# Patient Record
Sex: MALE | Race: WHITE | NOT HISPANIC OR LATINO | Employment: OTHER | URBAN - NONMETROPOLITAN AREA
[De-identification: names, ages, dates, MRNs, and addresses within clinical notes are randomized per-mention and may not be internally consistent; named-entity substitution may affect disease eponyms.]

---

## 2017-02-20 ENCOUNTER — OFFICE VISIT (OUTPATIENT)
Dept: URGENT CARE | Facility: CLINIC | Age: 82
End: 2017-02-20
Payer: MEDICARE

## 2017-02-20 ENCOUNTER — HOSPITAL ENCOUNTER (OUTPATIENT)
Dept: RADIOLOGY | Facility: CLINIC | Age: 82
Discharge: HOME | End: 2017-02-20
Admitting: PHYSICIAN ASSISTANT
Payer: MEDICARE

## 2017-02-20 DIAGNOSIS — R06.2 WHEEZING: ICD-10-CM

## 2017-02-20 PROCEDURE — G0463 HOSPITAL OUTPT CLINIC VISIT: HCPCS

## 2017-02-20 PROCEDURE — 99214 OFFICE O/P EST MOD 30 MIN: CPT

## 2017-02-20 PROCEDURE — 71020 HB CHEST X-RAY 2VW FRONTAL&LATL: CPT

## 2017-11-20 ENCOUNTER — OFFICE VISIT (OUTPATIENT)
Dept: URGENT CARE | Facility: CLINIC | Age: 82
End: 2017-11-20
Payer: MEDICARE

## 2017-11-20 PROCEDURE — G0463 HOSPITAL OUTPT CLINIC VISIT: HCPCS

## 2017-11-20 PROCEDURE — 99213 OFFICE O/P EST LOW 20 MIN: CPT

## 2017-11-22 NOTE — PROGRESS NOTES
Assessment    1  History of acute bronchitis (V12 69) (Z87 09)   2  Acute bronchitis (466 0) (J20 9)    Plan  Acute bronchitis    · Amoxicillin-Pot Clavulanate 875-125 MG Oral Tablet (Augmentin); TAKE 1 TABLETEVERY 12 HOURS DAILY   · MethylPREDNISolone 4 MG Oral Tablet Therapy Pack (Medrol); 24mg day 1 thendecrease by 4 mg po daily for 6 days    Discussion/Summary  Discussion Summary:   Discussed dx of bronchitisd and will treat with augmentin and medrol dose pack is already using albuterol nebulizer according to patient and follow up with PCP in 1-2 days  Medication Side Effects Reviewed: Possible side effects of new medications were reviewed with the patient/guardian today  Understands and agrees with treatment plan: The treatment plan was reviewed with the patient/guardian  The patient/guardian understands and agrees with the treatment plan   Counseling Documentation With Imm: The patient was counseled regarding instructions for management,-- patient and family education,-- importance of compliance with treatment  total time of encounter was 25 minutes-- and-- 10 minutes was spent counseling  Follow Up Instructions: Follow Up with your Primary Care Provider in 1-2 days  If your symptoms worsen, go to the nearest [place] [id number] Emergency Department  Chief Complaint    1  Cold Symptoms  Chief Complaint Free Text Note Form: Since Friday productive cough congestion worse at night  History of Present Illness  HPI: 80year old male at urgent care today with chief complaint of chest congestion and productive cough denies nay fevers or chills  He has not used nay OTC medications   Hospital Based Practices Required Assessment:  Pain Assessment  the patient states they do not have pain  (on a scale of 0 to 10, the patient rates the pain at 0 )  Abuse And Domestic Violence Screen   Yes, the patient is safe at home  -- The patient states no one is hurting them  Depression And Suicide Screen   No, the patient has not had thoughts of hurting themself  No, the patient has not felt depressed in the past 7 days  Prefered Language is  [place]  Primary Language is  English  Readiness To Learn: Receptive  Barriers To Learning: none  Preferred Learning: verbal  Education Completed: disease/condition,-- medications-- and-- further treatment/follow-up  Teaching Method: verbal  Person Taught: patient  Evaluation Of Learning: verbalized/demonstrated understanding   Cold Symptoms: Acosta Flowers presents with complaints of cold symptoms  Associated symptoms include productive cough, but-- no sneezing,-- no nasal congestion,-- no runny nose,-- no post nasal drainage,-- no scratchy throat,-- no sore throat,-- no hoarseness,-- no dry cough,-- no facial pressure,-- no facial pain,-- no headache,-- no plugged ear(s),-- no ear pain,-- no swollen lymph nodes,-- no wheezing,-- no shortness of breath,-- no fatigue,-- no weakness,-- no nausea,-- no vomiting,-- no diarrhea,-- no fever-- and-- no chills  Review of Systems  Focused-Male:  Constitutional: no fever or chills, feels well, no tiredness, no recent weight loss or weight gain  ENT: no complaints of earache, no loss of hearing, no nosebleeds or nasal discharge, no sore throat or hoarseness  Cardiovascular: no complaints of slow or fast heart rate, no chest pain, no palpitations, no leg claudication or lower extremity edema  Respiratory: cough-- and-- wheezing, but-- as noted in HPI  Gastrointestinal: no complaints of abdominal pain, no constipation, no nausea or vomiting, no diarrhea or bloody stools  Genitourinary: no complaints of dysuria or incontinence, no hesitancy, no nocturia, no genital lesion, no inadequacy of penile erection  Musculoskeletal: no complaints of arthralgia, no myalgia, no joint swelling or stiffness, no limb pain or swelling  Integumentary: no complaints of skin rash or lesion, no itching or dry skin, no skin wounds    Neurological: no complaints of headache, no confusion, no numbness or tingling, no dizziness or fainting  ROS Reviewed:   ROS reviewed  Past Medical History    1  History of acute bronchitis (V12 69) (Z87 09)   2  History of hypertension (V12 59) (Z86 79)  Active Problems And Past Medical History Reviewed: The active problems and past medical history were reviewed and updated today  Family History  Family History Reviewed: The family history was reviewed and updated today  Social History  Social History Reviewed: The social history was reviewed and updated today  The social history was reviewed and is unchanged  Surgical History  Surgical History Reviewed: The surgical history was reviewed and updated today  Current Meds   1  AmLODIPine Besylate 5 MG Oral Tablet; Therapy: (Recorded:[id number]) to Recorded   2  Aspirin 81 MG TABS; Therapy: (Recorded:[id number]) to Recorded   3  Digoxin 125 MCG Oral Tablet; Therapy: (Recorded:[id number]) to Recorded   4  Losartan Potassium 50 MG Oral Tablet; Therapy: (Recorded:[id number]) to Recorded   5  Toprol XL 25 MG Oral Tablet Extended Release 24 Hour; Therapy: (Recorded:[id number]) to Recorded   6  Ventolin  (90 Base) MCG/ACT Inhalation Aerosol Solution; INHALE 1 PUFF EVERY 4 HOURS AS NEEDED; Therapy: [id number] to (Last Rx:[id number])  Requested for: [id number] Ordered  Medication List Reviewed: The medication list was reviewed and updated today  Allergies    1  No Known Drug Allergies    Vitals  Signs   Recorded: 20Nov2017 01:55PM   Temperature: 97 8 F  Heart Rate: 70  Respiration: 20  Systolic: 036  Diastolic: 76  O2 Saturation: 95    Physical Exam   Constitutional  General appearance: No acute distress, well appearing and well nourished  Eyes  Conjunctiva and lids: No swelling, erythema, or discharge  Pupils and irises: Equal, round and reactive to light     Ears, Nose, Mouth, and Throat  External inspection of ears and nose: Normal    Otoscopic examination: Tympanic membrance translucent with normal light reflex  Canals patent without erythema  Nasal mucosa, septum, and turbinates: Normal without edema or erythema  Oropharynx: Normal with no erythema, edema, exudate or lesions  Pulmonary  Respiratory effort: No increased work of breathing or signs of respiratory distress  Auscultation of lungs: Abnormal   Auscultation of the lungs revealed no expiratory wheezing  no rales or crackles were heard bilaterally  no rhonchi  no friction rub  diffuse wheezing bilaterally  Cardiovascular  Palpation of heart: Normal PMI, no thrills  Auscultation of heart: Normal rate and rhythm, normal S1 and S2, without murmurs  Lymphatic  Palpation of lymph nodes in neck: No lymphadenopathy     Musculoskeletal  Gait and station: Normal    Psychiatric  Orientation to person, place and time: Normal    Mood and affect: Normal        Signatures   Electronically signed by : Zaida Cameron NP; Nov 20 2017  2:10PM EST                       (Author)    Electronically signed by : MIRTHA Ward ; Nov 21 2017  4:22PM EST                       (Co-author)

## 2018-12-03 ENCOUNTER — OFFICE VISIT (OUTPATIENT)
Dept: URGENT CARE | Facility: CLINIC | Age: 83
End: 2018-12-03
Payer: MEDICARE

## 2018-12-03 VITALS
RESPIRATION RATE: 20 BRPM | OXYGEN SATURATION: 95 % | TEMPERATURE: 97.6 F | HEART RATE: 75 BPM | WEIGHT: 143 LBS | DIASTOLIC BLOOD PRESSURE: 79 MMHG | BODY MASS INDEX: 21.18 KG/M2 | SYSTOLIC BLOOD PRESSURE: 181 MMHG | HEIGHT: 69 IN

## 2018-12-03 DIAGNOSIS — J22 LOWER RESPIRATORY INFECTION (E.G., BRONCHITIS, PNEUMONIA, PNEUMONITIS, PULMONITIS): Primary | ICD-10-CM

## 2018-12-03 PROCEDURE — 99203 OFFICE O/P NEW LOW 30 MIN: CPT | Performed by: PHYSICIAN ASSISTANT

## 2018-12-03 PROCEDURE — G0463 HOSPITAL OUTPT CLINIC VISIT: HCPCS | Performed by: PHYSICIAN ASSISTANT

## 2018-12-03 RX ORDER — DIGOXIN 125 MCG
TABLET ORAL DAILY
COMMUNITY
End: 2019-07-31 | Stop reason: SDUPTHER

## 2018-12-03 RX ORDER — ALBUTEROL SULFATE 90 UG/1
2 AEROSOL, METERED RESPIRATORY (INHALATION) EVERY 6 HOURS PRN
Qty: 1 INHALER | Refills: 0 | Status: SHIPPED | OUTPATIENT
Start: 2018-12-03

## 2018-12-03 RX ORDER — LOSARTAN POTASSIUM 100 MG/1
TABLET ORAL DAILY
COMMUNITY
End: 2019-07-31 | Stop reason: ALTCHOICE

## 2018-12-03 RX ORDER — ASPIRIN 81 MG/1
81 TABLET ORAL DAILY
COMMUNITY

## 2018-12-03 RX ORDER — LEVOFLOXACIN 500 MG/1
500 TABLET, FILM COATED ORAL EVERY 24 HOURS
Qty: 7 TABLET | Refills: 0 | Status: SHIPPED | OUTPATIENT
Start: 2018-12-03 | End: 2018-12-10

## 2018-12-03 RX ORDER — MULTIVITAMIN
1 CAPSULE ORAL DAILY
COMMUNITY

## 2018-12-03 RX ORDER — BENZONATATE 100 MG/1
100 CAPSULE ORAL 3 TIMES DAILY PRN
Qty: 15 CAPSULE | Refills: 0 | Status: SHIPPED | OUTPATIENT
Start: 2018-12-03 | End: 2019-07-29 | Stop reason: ALTCHOICE

## 2018-12-03 RX ORDER — METOPROLOL TARTRATE 100 MG/1
TABLET ORAL EVERY 12 HOURS SCHEDULED
COMMUNITY
End: 2019-07-31 | Stop reason: SDUPTHER

## 2018-12-04 NOTE — PATIENT INSTRUCTIONS
Take antibiotic as directed until completion  Motrin and/or Tylenol as needed for fevers or body aches and pains  Use medications as directed for symptomatic relief  Follow up with PCP in 3-5 days  Proceed to  ER if symptoms worsen  Acute Bronchitis   AMBULATORY CARE:   Acute bronchitis  is swelling and irritation in the air passages of your lungs  This irritation may cause you to cough or have other breathing problems  Acute bronchitis often starts because of another illness, such as a cold or the flu  The illness spreads from your nose and throat to your windpipe and airways  Bronchitis is often called a chest cold  Acute bronchitis lasts about 3 to 6 weeks and is usually not a serious illness  Your cough can last for several weeks  You may have any of the following symptoms:   · A cough with sputum that may be clear, yellow, or green    · Feeling more tired than usual, and body aches    · A fever and chills    · Wheezing when you breathe    · A tight chest or pain when you breathe or cough  Seek care immediately if:   · You cough up blood  · Your lips or fingernails turn blue  · You feel like you are not getting enough air when you breathe  Contact your healthcare provider if:   · You have a fever  · Your breathing problems do not go away or get worse  · Your cough does not get better within 4 weeks  · You have questions or concerns about your condition or care  Self-care:   · Get more rest   Rest helps your body to heal  Slowly start to do more each day  Rest when you feel it is needed  · Avoid irritants in the air  Avoid chemicals, fumes, and dust  Wear a face mask if you must work around dust or fumes  Stay inside on days when air pollution levels are high  If you have allergies, stay inside when pollen counts are high  Do not use aerosol products, such as spray-on deodorant, bug spray, and hair spray  · Do not smoke or be around others who smoke    Nicotine and other chemicals in cigarettes and cigars damages the cilia that move mucus out of your lungs  Ask your healthcare provider for information if you currently smoke and need help to quit  E-cigarettes or smokeless tobacco still contain nicotine  Talk to your healthcare provider before you use these products  · Drink liquids as directed  Liquids help keep your air passages moist and help you cough up mucus  You may need to drink more liquids when you have acute bronchitis  Ask how much liquid to drink each day and which liquids are best for you  · Use a humidifier or vaporizer  Use a cool mist humidifier or a vaporizer to increase air moisture in your home  This may make it easier for you to breathe and help decrease your cough  Prevent acute bronchitis by doing the following:   · Get the vaccinations you need  Ask your healthcare provider if you should get vaccinated against the flu or pneumonia  · Prevent the spread of germs  You can decrease your risk of acute bronchitis and other illnesses by doing the following:     [facility] your hands often with soap and water  Carry germ-killing hand lotion or gel with you  You can use the lotion or gel to clean your hands when soap and water are not available  ¨ Do not touch your eyes, nose, or mouth unless you have washed your hands first     ¨ Always cover your mouth when you cough to prevent the spread of germs  It is best to cough into a tissue or your shirt sleeve instead of into your hand  Ask those around you cover their mouths when they cough  ¨ Try to avoid people who have a cold or the flu  If you are sick, stay away from others as much as possible  Medicines: Your healthcare provider may  give you any of the following:  · Ibuprofen or acetaminophen  are medicines that help lower your fever  They are available without a doctor's order  Ask your healthcare provider which medicine is right for you  Ask how much to take and how often to take it  Follow directions   These medicines can cause stomach bleeding if not taken correctly  Ibuprofen can cause kidney damage  Do not take ibuprofen if you have kidney disease, an ulcer, or allergies to aspirin  Acetaminophen can cause liver damage  Do not take more than 4,000 milligrams in 24 hours  · Decongestants  help loosen mucus in your lungs and make it easier to cough up  This can help you breathe easier  · Cough suppressants  decrease your urge to cough  If your cough produces mucus, do not take a cough suppressant unless your healthcare provider tells you to  Your healthcare provider may suggest that you take a cough suppressant at night so you can rest     · Inhalers  may be given  Your healthcare provider may give you one or more inhalers to help you breathe easier and cough less  An inhaler gives your medicine to open your airways  Ask your healthcare provider to show you how to use your inhaler correctly  Follow up with your healthcare provider as directed:  Write down questions you have so you will remember to ask them during your follow-up visits  © 2017 [facility] Information is for End User's use only and may not be sold, redistributed or otherwise used for commercial purposes  All illustrations and images included in CareNotes® are the copyrighted property of A D A M , Inc  or Ilia Ruffin  The above information is an  only  It is not intended as medical advice for individual conditions or treatments  Talk to your doctor, nurse or pharmacist before following any medical regimen to see if it is safe and effective for you

## 2018-12-04 NOTE — PROGRESS NOTES
[facility]        NAME: Edwina Nunes is a 80 y o  male  : 1930    MRN: [id number]  DATE: December 3, 2018  TIME: 7:16 PM    Assessment and Plan   Lower respiratory infection (e g , bronchitis, pneumonia, pneumonitis, pulmonitis) [J22]  1  Lower respiratory infection (e g , bronchitis, pneumonia, pneumonitis, pulmonitis)  levofloxacin (LEVAQUIN) 500 mg tablet    albuterol (PROVENTIL HFA,VENTOLIN HFA) 90 mcg/act inhaler    benzonatate (TESSALON PERLES) 100 mg capsule         Patient Instructions     Take antibiotic as directed until completion  Motrin and/or Tylenol as needed for fevers or body aches and pains  Use medications as directed for symptomatic relief  Follow up with PCP in 3-5 days  Proceed to  ER if symptoms worsen  Chief Complaint     Chief Complaint   Patient presents with    Cough     Productive cough, onset 7 days ago  Symptoms worsening         History of Present Illness       80year-old  male presents with a one-week history of cough and congestion  Patient reports over the past week has been having more cough congestion with bringing up sputum  Denies any fevers chills nausea vomiting diarrhea  No chest pain or abdominal pain  Cough   This is a new problem  The current episode started in the past 7 days  The problem has been gradually worsening  The problem occurs constantly  The cough is productive of sputum  Associated symptoms include nasal congestion, rhinorrhea and wheezing  Pertinent negatives include no chest pain, ear pain, fever or sore throat  Nothing aggravates the symptoms  He has tried nothing for the symptoms  The treatment provided no relief  Review of Systems   Review of Systems   Constitutional: Negative  Negative for fever  HENT: Positive for rhinorrhea  Negative for ear pain and sore throat  Eyes: Negative  Respiratory: Positive for cough and wheezing  Cardiovascular: Negative  Negative for chest pain     Gastrointestinal: Negative  Musculoskeletal: Negative  Skin: Negative  Neurological: Negative  Current Medications       Current Outpatient Prescriptions:     aspirin (ECOTRIN LOW STRENGTH) 81 mg EC tablet, Take 81 mg by mouth daily, Disp: , Rfl:     digoxin (LANOXIN) 0 125 mg tablet, Take by mouth daily, Disp: , Rfl:     losartan (COZAAR) 100 MG tablet, Take by mouth daily, Disp: , Rfl:     metoprolol tartrate (LOPRESSOR) 100 mg tablet, Take by mouth every 12 (twelve) hours, Disp: , Rfl:     Multiple Vitamin (MULTIVITAMIN) capsule, Take 1 capsule by mouth daily, Disp: , Rfl:     albuterol (PROVENTIL HFA,VENTOLIN HFA) 90 mcg/act inhaler, Inhale 2 puffs every 6 (six) hours as needed for wheezing, Disp: 1 Inhaler, Rfl: 0    benzonatate (TESSALON PERLES) 100 mg capsule, Take 1 capsule (100 mg total) by mouth 3 (three) times a day as needed for cough, Disp: 15 capsule, Rfl: 0    levofloxacin (LEVAQUIN) 500 mg tablet, Take 1 tablet (500 mg total) by mouth every 24 hours for 7 days, Disp: 7 tablet, Rfl: 0    Current Allergies     Allergies as of 12/03/2018    (No Known Allergies)            The following portions of the patient's history were reviewed and updated as appropriate: allergies, current medications, past family history, past medical history, past social history, past surgical history and problem list      Past Medical History:   Diagnosis Date    Atrial fibrillation [facility] [address] )     Colorectal cancer ([facility] [address] )     Hypertension     Pneumonia        Past Surgical History:   Procedure Laterality Date    COLECTOMY      COLOSTOMY         History reviewed  No pertinent family history  Medications have been verified  Objective   BP (!) 181/79   Pulse 75   Temp 97 6 °F (36 4 °C) (Tympanic)   Resp 20   Ht 5' 9" (1 753 m)   Wt 64 9 kg (143 lb)   SpO2 95%   BMI 21 12 kg/m²        Physical Exam     Physical Exam   Constitutional: He is oriented to person, place, and time   He appears well-developed and well-nourished  No distress  HENT:   Head: Normocephalic and atraumatic  Right Ear: External ear normal    Left Ear: External ear normal    Nose: Nose normal    Mouth/Throat: Oropharynx is clear and moist  No oropharyngeal exudate  Eyes: Conjunctivae are normal  Right eye exhibits no discharge  Left eye exhibits no discharge  Neck: Normal range of motion  Neck supple  Cardiovascular: Normal rate, regular rhythm, normal heart sounds and intact distal pulses  No murmur heard  Pulmonary/Chest: Effort normal  No respiratory distress  He has decreased breath sounds  He has wheezes  He has no rhonchi  He has no rales  Abdominal: Soft  Bowel sounds are normal  There is no tenderness  Musculoskeletal: Normal range of motion  Lymphadenopathy:     He has no cervical adenopathy  Neurological: He is alert and oriented to person, place, and time  Skin: Skin is warm and dry  Psychiatric: He has a normal mood and affect  Nursing note and vitals reviewed

## 2019-07-29 RX ORDER — AZELASTINE 1 MG/ML
1 SPRAY, METERED NASAL 2 TIMES DAILY
COMMUNITY
End: 2019-07-31 | Stop reason: SDUPTHER

## 2019-07-31 ENCOUNTER — OFFICE VISIT (OUTPATIENT)
Dept: FAMILY MEDICINE CLINIC | Facility: CLINIC | Age: 84
End: 2019-07-31
Payer: MEDICARE

## 2019-07-31 VITALS
BODY MASS INDEX: 20.14 KG/M2 | WEIGHT: 136 LBS | HEIGHT: 69 IN | SYSTOLIC BLOOD PRESSURE: 120 MMHG | DIASTOLIC BLOOD PRESSURE: 70 MMHG

## 2019-07-31 DIAGNOSIS — N39.46 MIXED STRESS AND URGE URINARY INCONTINENCE: Chronic | ICD-10-CM

## 2019-07-31 DIAGNOSIS — I48.0 PAROXYSMAL ATRIAL FIBRILLATION (HCC): ICD-10-CM

## 2019-07-31 DIAGNOSIS — I10 ESSENTIAL HYPERTENSION: Primary | ICD-10-CM

## 2019-07-31 DIAGNOSIS — J30.89 NON-SEASONAL ALLERGIC RHINITIS DUE TO OTHER ALLERGIC TRIGGER: ICD-10-CM

## 2019-07-31 DIAGNOSIS — Z93.3 COLOSTOMY PRESENT (HCC): Chronic | ICD-10-CM

## 2019-07-31 PROBLEM — C20 RECTAL CARCINOMA (HCC): Chronic | Status: ACTIVE | Noted: 2019-07-31

## 2019-07-31 PROCEDURE — 99214 OFFICE O/P EST MOD 30 MIN: CPT | Performed by: FAMILY MEDICINE

## 2019-07-31 RX ORDER — LOSARTAN POTASSIUM AND HYDROCHLOROTHIAZIDE 12.5; 5 MG/1; MG/1
1 TABLET ORAL DAILY
Qty: 90 TABLET | Refills: 3 | Status: SHIPPED | OUTPATIENT
Start: 2019-07-31 | End: 2020-02-07 | Stop reason: SDUPTHER

## 2019-07-31 RX ORDER — AZELASTINE 1 MG/ML
1 SPRAY, METERED NASAL 2 TIMES DAILY
Qty: 1 BOTTLE | Refills: 5 | Status: SHIPPED | OUTPATIENT
Start: 2019-07-31 | End: 2020-07-29 | Stop reason: SDUPTHER

## 2019-07-31 RX ORDER — DIGOXIN 125 MCG
125 TABLET ORAL DAILY
Qty: 90 TABLET | Refills: 3 | Status: SHIPPED | OUTPATIENT
Start: 2019-07-31 | End: 2020-08-17 | Stop reason: SDUPTHER

## 2019-07-31 RX ORDER — LOSARTAN POTASSIUM AND HYDROCHLOROTHIAZIDE 12.5; 5 MG/1; MG/1
1 TABLET ORAL DAILY
COMMUNITY
End: 2019-07-31 | Stop reason: SDUPTHER

## 2019-07-31 NOTE — PATIENT INSTRUCTIONS
Overall doing well will continue all current medication and prescriptions were sent in    Will recheck here in 3 months and do wellness check at that time

## 2019-07-31 NOTE — PROGRESS NOTES
Assessment/Plan:    No problem-specific Assessment & Plan notes found for this encounter  Diagnoses and all orders for this visit:    Essential hypertension  Comments:  Overall doing well continue current regimen  Orders:  -     losartan-hydrochlorothiazide (HYZAAR) 50-12 5 mg per tablet; Take 1 tablet by mouth daily    Paroxysmal atrial fibrillation (HCC)  Comments: In sinus rhythm today overall doing well continue baseline meds  Orders:  -     digoxin (LANOXIN) 0 125 mg tablet; Take 1 tablet (125 mcg total) by mouth daily  -     metoprolol tartrate (LOPRESSOR) 25 mg tablet; Take 1 tablet (25 mg total) by mouth every 12 (twelve) hours    Non-seasonal allergic rhinitis due to other allergic trigger  Comments:  Gave refill for the Astelin to use twice a day  Orders:  -     azelastine (ASTELIN) 0 1 % nasal spray; 1 spray into each nostril 2 (two) times a day Use in each nostril as directed    Colostomy present [facility])  Comments:  Doing well continue basic care  Watch his foods that may increase the loose stools    Mixed stress and urge urinary incontinence  Comments:  Seemingly doing well will continue to follow    Other orders  -     Discontinue: azelastine (ASTELIN) 0 1 % nasal spray; 1 spray into each nostril 2 (two) times a day Use in each nostril as directed  -     Discontinue: losartan-hydrochlorothiazide (HYZAAR) 50-12 5 mg per tablet; Take 1 tablet by mouth daily          Subjective:      Patient ID: Balbir Sandoval  is a 80 y o  male  Patient has history of hypertension, rectal carcinoma, permanent colostomy, allergic rhinitis, urinary incontinence and atrial flutter fibrillation combination    Overall has been doing well does need a form filled out for his 's license      The following portions of the patient's history were reviewed and updated as appropriate: allergies, current medications, past family history, past medical history, past social history, past surgical history and problem list     Review of Systems   Constitutional: Negative for activity change, appetite change, chills, fatigue, fever and unexpected weight change  HENT: Negative for congestion, rhinorrhea and trouble swallowing  Eyes: Negative for visual disturbance  Respiratory: Negative for apnea, cough, chest tightness and shortness of breath  Cardiovascular: Negative for chest pain, palpitations and leg swelling  Gastrointestinal: Negative for abdominal distention, abdominal pain, constipation and diarrhea  Endocrine: Negative for polyuria  Genitourinary: Negative for difficulty urinating  Musculoskeletal: Negative for arthralgias and myalgias  Skin: Negative for rash  Allergic/Immunologic: Negative for environmental allergies  Neurological: Negative for dizziness, weakness, light-headedness, numbness and headaches  Hematological: Negative for adenopathy  Psychiatric/Behavioral: Negative for agitation and confusion  Objective:      /70 (BP Location: Left arm, Patient Position: Sitting, Cuff Size: Standard)   Ht 5' 9" (1 753 m)   Wt 61 7 kg (136 lb)   BMI 20 08 kg/m²          Physical Exam   Constitutional: He is oriented to person, place, and time  He appears well-developed  HENT:   Head: Normocephalic  Eyes: Pupils are equal, round, and reactive to light  Neck: Neck supple  No thyromegaly present  Cardiovascular: Normal rate, regular rhythm and intact distal pulses  Murmur (Holosystolic murmur at left sternal border heart rate is 78 no bruits are noted) heard  Pulmonary/Chest: Effort normal and breath sounds normal    Abdominal: He exhibits no distension and no mass  There is no tenderness  Musculoskeletal: He exhibits no edema  Lymphadenopathy:     He has no cervical adenopathy  Neurological: He is alert and oriented to person, place, and time  He displays normal reflexes  Coordination normal    Skin: Skin is warm and dry  No rash noted     Psychiatric: He has a normal mood and affect  Nursing note and vitals reviewed

## 2019-09-19 ENCOUNTER — OFFICE VISIT (OUTPATIENT)
Dept: FAMILY MEDICINE CLINIC | Facility: CLINIC | Age: 84
End: 2019-09-19
Payer: MEDICARE

## 2019-09-19 VITALS
WEIGHT: 131.4 LBS | SYSTOLIC BLOOD PRESSURE: 122 MMHG | HEIGHT: 68 IN | BODY MASS INDEX: 19.91 KG/M2 | DIASTOLIC BLOOD PRESSURE: 72 MMHG

## 2019-09-19 DIAGNOSIS — I10 ESSENTIAL HYPERTENSION: Primary | Chronic | ICD-10-CM

## 2019-09-19 DIAGNOSIS — C20 RECTAL CARCINOMA (HCC): Chronic | ICD-10-CM

## 2019-09-19 DIAGNOSIS — H25.23 MORGAGNIAN AGE-RELATED CATARACT OF BOTH EYES: ICD-10-CM

## 2019-09-19 DIAGNOSIS — J30.89 NON-SEASONAL ALLERGIC RHINITIS, UNSPECIFIED TRIGGER: Chronic | ICD-10-CM

## 2019-09-19 DIAGNOSIS — I48.0 PAROXYSMAL ATRIAL FIBRILLATION (HCC): Chronic | ICD-10-CM

## 2019-09-19 PROCEDURE — 99214 OFFICE O/P EST MOD 30 MIN: CPT | Performed by: FAMILY MEDICINE

## 2019-09-19 NOTE — PATIENT INSTRUCTIONS
Overall exam is stable and patient looks at baseline and suitable for surgical procedures at this time will fill out form and clear for surgery

## 2019-09-19 NOTE — PROGRESS NOTES
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  Comments:  Overall exam is stable I feel patient at baseline and suitable for surgical procedure at this time  Will fill out form and clear for surgery    Paroxysmal atrial fibrillation (HCC)  Comments:  Seems in sinus rhythm at this time continue baseline meds    Non-seasonal allergic rhinitis, unspecified trigger  Comments:  Mildly symptomatic continue current regimen    Rectal carcinoma (HCC)  Comments:  Doing well continue basic colostomy care          Subjective:      Patient ID: Suman Benton  is a 80 y o  male  Patient has history of hypertension, rectal carcinoma, allergic rhinitis, paroxysmal atrial fibrillation and urinary incontinence is going to have bilateral cataract surgery starting with the left eye and needs pre-surgical clearance  Only surgical history is bilateral hand surgery where he had no reported problems with anesthesia  Patient has functioning colostomy      The following portions of the patient's history were reviewed and updated as appropriate: allergies, current medications, past family history, past medical history, past social history, past surgical history and problem list     Review of Systems   Constitutional: Negative for activity change, appetite change, chills, fatigue, fever and unexpected weight change  HENT: Negative for congestion, dental problem, rhinorrhea and trouble swallowing  Eyes: Positive for visual disturbance (Some blurring of vision)  Respiratory: Negative for apnea, cough, chest tightness and shortness of breath  Cardiovascular: Negative for chest pain, palpitations and leg swelling  Gastrointestinal: Negative for abdominal distention, abdominal pain, constipation and diarrhea  Endocrine: Negative for polyuria  Genitourinary: Negative for difficulty urinating  Musculoskeletal: Negative for arthralgias and myalgias  Skin: Negative for rash  Allergic/Immunologic: Positive for environmental allergies  Neurological: Negative for dizziness, weakness, light-headedness, numbness and headaches  Hematological: Negative for adenopathy  Does not bruise/bleed easily  Psychiatric/Behavioral: Negative for agitation and confusion  Objective:      /72 (BP Location: Left arm, Patient Position: Sitting, Cuff Size: Standard)   Ht 5' 8" (1 727 m)   Wt 59 6 kg (131 lb 6 4 oz)   BMI 19 98 kg/m²          Physical Exam   Constitutional: He is oriented to person, place, and time  He appears well-developed and well-nourished  No distress ( temperature 97 7°)  HENT:   Head: Normocephalic  Right Ear: Tympanic membrane, external ear and ear canal normal    Left Ear: Tympanic membrane, external ear and ear canal normal    Nose: Nose normal    Mouth/Throat: Oropharynx is clear and moist  He has dentures ( full upper dentures does not wear lower dentures)  Eyes: Pupils are equal, round, and reactive to light  Conjunctivae and EOM are normal    Neck: Normal range of motion  Neck supple  No thyromegaly present  Cardiovascular: Normal rate, regular rhythm and intact distal pulses  Murmur (Holosystolic murmur at left sternal border no bruits are noted heart rate is 72) heard  Pulses:       Dorsalis pedis pulses are 1+ on the right side, and 1+ on the left side  Posterior tibial pulses are 1+ on the right side, and 1+ on the left side  Pulmonary/Chest: Effort normal and breath sounds normal    Abdominal: Soft  He exhibits no distension and no mass  There is no tenderness  Musculoskeletal: Normal range of motion  He exhibits no edema  Feet:   Right Foot:   Protective Sensation: 8 sites tested  8 sites sensed  Left Foot:   Protective Sensation: 8 sites tested   8 sites sensed  Lymphadenopathy:     He has no cervical adenopathy  Neurological: He is alert and oriented to person, place, and time  He displays normal reflexes  No cranial nerve deficit or sensory deficit  He exhibits normal muscle tone  Coordination normal    Skin: Skin is warm and dry  Capillary refill takes 2 to 3 seconds  No rash noted  Psychiatric: He has a normal mood and affect  Nursing note and vitals reviewed

## 2019-10-29 ENCOUNTER — OFFICE VISIT (OUTPATIENT)
Dept: FAMILY MEDICINE CLINIC | Facility: CLINIC | Age: 84
End: 2019-10-29
Payer: MEDICARE

## 2019-10-29 VITALS
WEIGHT: 137 LBS | SYSTOLIC BLOOD PRESSURE: 142 MMHG | HEIGHT: 68 IN | BODY MASS INDEX: 20.76 KG/M2 | DIASTOLIC BLOOD PRESSURE: 82 MMHG

## 2019-10-29 DIAGNOSIS — Z23 NEEDS FLU SHOT: ICD-10-CM

## 2019-10-29 DIAGNOSIS — N39.46 MIXED STRESS AND URGE URINARY INCONTINENCE: Chronic | ICD-10-CM

## 2019-10-29 DIAGNOSIS — Z00.00 MEDICARE ANNUAL WELLNESS VISIT, SUBSEQUENT: ICD-10-CM

## 2019-10-29 DIAGNOSIS — I10 ESSENTIAL HYPERTENSION: Primary | Chronic | ICD-10-CM

## 2019-10-29 DIAGNOSIS — I48.0 PAROXYSMAL ATRIAL FIBRILLATION (HCC): Chronic | ICD-10-CM

## 2019-10-29 DIAGNOSIS — C20 RECTAL CARCINOMA (HCC): Chronic | ICD-10-CM

## 2019-10-29 DIAGNOSIS — J30.89 NON-SEASONAL ALLERGIC RHINITIS, UNSPECIFIED TRIGGER: Chronic | ICD-10-CM

## 2019-10-29 PROCEDURE — G0008 ADMIN INFLUENZA VIRUS VAC: HCPCS | Performed by: FAMILY MEDICINE

## 2019-10-29 PROCEDURE — 99214 OFFICE O/P EST MOD 30 MIN: CPT | Performed by: FAMILY MEDICINE

## 2019-10-29 PROCEDURE — 90662 IIV NO PRSV INCREASED AG IM: CPT | Performed by: FAMILY MEDICINE

## 2019-10-29 PROCEDURE — G0439 PPPS, SUBSEQ VISIT: HCPCS | Performed by: FAMILY MEDICINE

## 2019-10-29 RX ORDER — KETOROLAC TROMETHAMINE 5 MG/ML
SOLUTION OPHTHALMIC
Refills: 2 | COMMUNITY
Start: 2019-10-10

## 2019-10-29 NOTE — PROGRESS NOTES
Assessment/Plan:    Essential hypertension  Overall stable continue current regimen    Paroxysmal atrial fibrillation (HCC)  Remains in sinus rhythm will continue current meds    Non-seasonal allergic rhinitis  Occasionally symptomatic continue p r n  Meds    Rectal carcinoma ([address] )  Does have occasional diarrhea will continue to follow    Mixed stress and urge urinary incontinence  Doing well at this time watch fluid intake at night       Diagnoses and all orders for this visit:    Essential hypertension    Medicare annual wellness visit, subsequent    Paroxysmal atrial fibrillation (HCC)    Non-seasonal allergic rhinitis, unspecified trigger    Rectal carcinoma (HCC)    Mixed stress and urge urinary incontinence    Other orders  -     ketorolac (ACULAR) 0 5 % ophthalmic solution          Subjective:      Patient ID: Mehul Brown  is a 80 y o  male  HPI    The following portions of the patient's history were reviewed and updated as appropriate: allergies, current medications, past family history, past medical history, past social history, past surgical history and problem list     Review of Systems   Constitutional: Negative for activity change, appetite change, chills, fatigue, fever and unexpected weight change  HENT: Negative for congestion, dental problem, hearing loss, rhinorrhea, trouble swallowing and voice change  Eyes: Negative for visual disturbance  Respiratory: Negative for apnea, cough, chest tightness and shortness of breath  Cardiovascular: Negative for chest pain, palpitations and leg swelling  Gastrointestinal: Positive for diarrhea  Negative for abdominal distention, abdominal pain and constipation  Endocrine: Negative for polyuria  Genitourinary: Negative for difficulty urinating and enuresis  Musculoskeletal: Negative for arthralgias and myalgias  Skin: Negative for rash  Allergic/Immunologic: Negative for environmental allergies     Neurological: Negative for dizziness, weakness, light-headedness, numbness and headaches  Hematological: Negative for adenopathy  Does not bruise/bleed easily  Psychiatric/Behavioral: Positive for sleep disturbance  Negative for agitation and confusion  Objective:      /82 (BP Location: Left arm, Patient Position: Sitting, Cuff Size: Standard)   Ht 5' 8" (1 727 m)   Wt 62 1 kg (137 lb)   BMI 20 83 kg/m²          Physical Exam   Constitutional: He is oriented to person, place, and time  He appears well-developed and well-nourished  No distress  HENT:   Head: Normocephalic  Right Ear: Tympanic membrane, external ear and ear canal normal  Decreased hearing (Hearing screen off at 25 decibels slight difficulty with conversation) is noted  Left Ear: Tympanic membrane, external ear and ear canal normal  Decreased hearing ( hearing screen off at 25 decibels) is noted  Nose: Nose normal    Mouth/Throat: Oropharynx is clear and moist  He has dentures ( no dentition does have upper dentures)  Eyes: Pupils are equal, round, and reactive to light  Conjunctivae and EOM are normal    Neck: Normal range of motion  Neck supple  No thyromegaly present  Cardiovascular: Normal rate, regular rhythm and intact distal pulses  Murmur ( soft holosystolic murmur at left sternal border radiating up to carotids no bruits are noted rate is 78) heard  Pulses:       Dorsalis pedis pulses are 1+ on the right side, and 1+ on the left side  Posterior tibial pulses are 1+ on the right side, and 1+ on the left side  Pulmonary/Chest: Effort normal and breath sounds normal    Abdominal: Soft  He exhibits no distension and no mass  There is no tenderness  Musculoskeletal: Normal range of motion  He exhibits no edema  Right foot: There is no deformity  Feet:   Right Foot:   Protective Sensation: 8 sites tested  8 sites sensed  Left Foot:   Protective Sensation: 8 sites tested  8 sites sensed     Lymphadenopathy:     He has no cervical adenopathy  Neurological: He is alert and oriented to person, place, and time  He displays normal reflexes  No cranial nerve deficit or sensory deficit  He exhibits normal muscle tone  Coordination normal    Skin: Skin is warm and dry  No rash noted  Psychiatric: He has a normal mood and affect  His behavior is normal  Judgment and thought content normal    Nursing note and vitals reviewed

## 2019-10-29 NOTE — PROGRESS NOTES
Assessment and Plan:     Problem List Items Addressed This Visit     None           Preventive health issues were discussed with patient, and age appropriate screening tests were ordered as noted in patient's After Visit Summary  Personalized health advice and appropriate referrals for health education or preventive services given if needed, as noted in patient's After Visit Summary  History of Present Illness:     Patient presents for Medicare Annual Wellness visit    Patient Care Team:  Elizabeth Dawson MD as PCP - General (Family Medicine)     Problem List:     Patient Active Problem List   Diagnosis    Essential hypertension    Paroxysmal atrial fibrillation (HCC)    Rectal carcinoma ([facility] )    Colostomy present ([place] [address] )    Non-seasonal allergic rhinitis    Mixed stress and urge urinary incontinence      Past Medical and Surgical History:     Past Medical History:   Diagnosis Date    Atrial fibrillation ([facility] )     Colorectal cancer ([place] [address] )     Hypertension     Pneumonia      Past Surgical History:   Procedure Laterality Date    COLECTOMY      COLOSTOMY        Family History:     No family history on file  Social History:     Social History     Socioeconomic History    Marital status:      Spouse name: Not on file    Number of children: Not on file    Years of education: Not on file    Highest education level: Not on file   Occupational History    Not on file   Social Needs    Financial resource strain: Not on file    Food insecurity:     Worry: Not on file     Inability: Not on file    Transportation needs:     Medical: Not on file     Non-medical: Not on file   Tobacco Use    Smoking status: Former Smoker    Smokeless tobacco: Never Used   Substance and Sexual Activity    Alcohol use:  Yes    Drug use: No    Sexual activity: Not on file   Lifestyle    Physical activity:     Days per week: Not on file     Minutes per session: Not on file    Stress: Not on file   Relationships    Social connections:     Talks on phone: Not on file     Gets together: Not on file     Attends Taoist service: Not on file     Active member of club or organization: Not on file     Attends meetings of clubs or organizations: Not on file     Relationship status: Not on file    Intimate partner violence:     Fear of current or ex partner: Not on file     Emotionally abused: Not on file     Physically abused: Not on file     Forced sexual activity: Not on file   Other Topics Concern    Not on file   Social History Narrative    Not on file       Medications and Allergies:     Current Outpatient Medications   Medication Sig Dispense Refill    albuterol (PROVENTIL HFA,VENTOLIN HFA) 90 mcg/act inhaler Inhale 2 puffs every 6 (six) hours as needed for wheezing 1 Inhaler 0    aspirin (ECOTRIN LOW STRENGTH) 81 mg EC tablet Take 81 mg by mouth daily      azelastine (ASTELIN) 0 1 % nasal spray 1 spray into each nostril 2 (two) times a day Use in each nostril as directed 1 Bottle 5    digoxin (LANOXIN) 0 125 mg tablet Take 1 tablet (125 mcg total) by mouth daily 90 tablet 3    ketorolac (ACULAR) 0 5 % ophthalmic solution   2    losartan-hydrochlorothiazide (HYZAAR) 50-12 5 mg per tablet Take 1 tablet by mouth daily 90 tablet 3    metoprolol tartrate (LOPRESSOR) 25 mg tablet Take 1 tablet (25 mg total) by mouth every 12 (twelve) hours 90 tablet 3    Multiple Vitamin (MULTIVITAMIN) capsule Take 1 capsule by mouth daily       No current facility-administered medications for this visit        No Known Allergies   Immunizations:     Immunization History   Administered Date(s) Administered    INFLUENZA 09/12/2007, 09/18/2008, 09/14/2009, 09/16/2010, 10/25/2011, 11/02/2012, 11/14/2013, 11/20/2014, 10/08/2015, 11/30/2016, 12/12/2017, 10/18/2018    Pneumococcal Polysaccharide PPV23 08/20/2014      Health Maintenance:         Topic Date Due    CRC Screening: Colonoscopy  06/12/2023         Topic Date Due    DTaP,Tdap,and Td Vaccines (1 - Tdap) 02/01/1951    Pneumococcal Vaccine: 65+ Years (2 of 2 - PCV13) 08/20/2015    INFLUENZA VACCINE  07/01/2019      Medicare Health Risk Assessment:     There were no vitals taken for this visit  Duc Arteaga is here for his Subsequent Wellness visit  Last Medicare Wellness visit information reviewed, patient interviewed and updates made to the record today  Health Risk Assessment:   Patient rates overall health as good  Patient feels that their physical health rating is same  Eyesight was rated as same  Hearing was rated as same  Patient feels that their emotional and mental health rating is same  Pain experienced in the last 7 days has been none  Patient states that he has experienced no weight loss or gain in last 6 months  No issues    Depression Screening:   PHQ-2 Score: 0      Fall Risk Screening: In the past year, patient has experienced: no history of falling in past year      Home Safety:  Patient has trouble with stairs inside or outside of their home  Patient has working smoke alarms and has working carbon monoxide detector  Home safety hazards include: none  No issues although I did advise grab bars in the bathroom    Nutrition:   Current diet is Regular  No issues    Medications:   Patient is currently taking over-the-counter supplements  OTC medications include: Baby aspirin  Patient is able to manage medications  No issues    Activities of Daily Living (ADLs)/Instrumental Activities of Daily Living (IADLs):   Walk and transfer into and out of bed and chair?: Yes  Dress and groom yourself?: Yes    Bathe or shower yourself?: Yes    Feed yourself?  Yes  Do your laundry/housekeeping?: Yes  Manage your money, pay your bills and track your expenses?: Yes  Make your own meals?: Yes    Do your own shopping?: Yes    ADL comments: Overall patient is very functional at home no issues    Previous Hospitalizations:   Any hospitalizations or ED visits within the last 12 months?: No Advance Care Planning:   Living will: Yes    Advanced directive: Yes      Comments: No issues    Cognitive Screening:   Provider or family/friend/caregiver concerned regarding cognition?: No    PREVENTIVE SCREENINGS      Cardiovascular Screening:    General: Screening Current      Diabetes Screening:     General: Screening Current      Colorectal Cancer Screening:     General: Screening Not Indicated and History Colorectal Cancer      Prostate Cancer Screening:    General: Screening Not Indicated      Osteoporosis Screening:    General: Screening Not Indicated      Abdominal Aortic Aneurysm (AAA) Screening:    Risk factors include: tobacco use        General: Screening Not Indicated      Lung Cancer Screening:     General: Screening Not Indicated      Hepatitis C Screening:    General: Screening Not Indicated    Other Counseling Topics:   Regular weightbearing exercise         Cele Onofre MD

## 2019-10-29 NOTE — PATIENT INSTRUCTIONS
Medicare Preventive Visit Patient Instructions  Thank you for completing your Welcome to Medicare Visit or Medicare Annual Wellness Visit today  Your next wellness visit will be due in one year (10/29/2020)  The screening/preventive services that you may require over the next 5-10 years are detailed below  Some tests may not apply to you based off risk factors and/or age  Screening tests ordered at today's visit but not completed yet may show as past due  Also, please note that scanned in results may not display below  Preventive Screenings:  Service Recommendations Previous Testing/Comments   Colorectal Cancer Screening  · Colonoscopy    · Fecal Occult Blood Test (FOBT)/Fecal Immunochemical Test (FIT)  · Fecal DNA/Cologuard Test  · Flexible Sigmoidoscopy Age: 54-65 years old   Colonoscopy: every 10 years (May be performed more frequently if at higher risk)  OR  FOBT/FIT: every 1 year  OR  Cologuard: every 3 years  OR  Sigmoidoscopy: every 5 years  Screening may be recommended earlier than age 48 if at higher risk for colorectal cancer  Also, an individualized decision between you and your healthcare provider will decide whether screening between the ages of 74-80 would be appropriate   Colonoscopy: 06/12/2013  FOBT/FIT: Not on file  Cologuard: Not on file  Sigmoidoscopy: Not on file    Screening Not Indicated  History Colorectal Cancer     Prostate Cancer Screening Individualized decision between patient and health care provider in men between ages of 53-78   Medicare will cover every 12 months beginning on the day after your 50th birthday PSA: No results in last 5 years     Screening Not Indicated     Hepatitis C Screening Once for adults born between [birth year] and 1965  More frequently in patients at high risk for Hepatitis C Hep C Antibody: Not on file       Diabetes Screening 1-2 times per year if you're at risk for diabetes or have pre-diabetes Fasting glucose: No results in last 5 years   A1C: No results in last 5 years       Cholesterol Screening Once every 5 years if you don't have a lipid disorder  May order more often based on risk factors  Lipid panel: Not on file          Other Preventive Screenings Covered by Medicare:  1  Abdominal Aortic Aneurysm (AAA) Screening: covered once if your at risk  You're considered to be at risk if you have a family history of AAA or a male between the age of 73-68 who smoking at least 100 cigarettes in your lifetime  2  Lung Cancer Screening: covers low dose CT scan once per year if you meet all of the following conditions: (1) Age 50-69; (2) No signs or symptoms of lung cancer; (3) Current smoker or have quit smoking within the last 15 years; (4) You have a tobacco smoking history of at least 30 pack years (packs per day x number of years you smoked); (5) You get a written order from a healthcare provider  3  Glaucoma Screening: covered annually if you're considered high risk: (1) You have diabetes OR (2) Family history of glaucoma OR (3)  aged 48 and older OR (3)  American aged 72 and older  3  Osteoporosis Screening: covered every 2 years if you meet one of the following conditions: (1) Have a vertebral abnormality; (2) On glucocorticoid therapy for more than 3 months; (3) Have primary hyperparathyroidism; (4) On osteoporosis medications and need to assess response to drug therapy  5  HIV Screening: covered annually if you're between the age of 12-76  Also covered annually if you are younger than 13 and older than 72 with risk factors for HIV infection  For pregnant patients, it is covered up to 3 times per pregnancy      Immunizations:  Immunization Recommendations   Influenza Vaccine Annual influenza vaccination during flu season is recommended for all persons aged >= 6 months who do not have contraindications   Pneumococcal Vaccine (Prevnar and Pneumovax)  * Prevnar = PCV13  * Pneumovax = PPSV23 Adults 25-60 years old: 1-3 doses may be recommended based on certain risk factors  Adults 72 years old: Prevnar (PCV13) vaccine recommended followed by Pneumovax (PPSV23) vaccine  If already received PPSV23 since turning 65, then PCV13 recommended at least one year after PPSV23 dose  Hepatitis B Vaccine 3 dose series if at intermediate or high risk (ex: diabetes, end stage renal disease, liver disease)   Tetanus (Td) Vaccine - COST NOT COVERED BY MEDICARE PART B Following completion of primary series, a booster dose should be given every 10 years to maintain immunity against tetanus  Td may also be given as tetanus wound prophylaxis  Tdap Vaccine - COST NOT COVERED BY MEDICARE PART B Recommended at least once for all adults  For pregnant patients, recommended with each pregnancy  Shingles Vaccine (Shingrix) - COST NOT COVERED BY MEDICARE PART B  2 shot series recommended in those aged 48 and above     Health Maintenance Due:      Topic Date Due    CRC Screening: Colonoscopy  06/12/2023     Immunizations Due:      Topic Date Due    DTaP,Tdap,and Td Vaccines (1 - Tdap) 02/01/1951    Pneumococcal Vaccine: 65+ Years (2 of 2 - PCV13) 08/20/2015    INFLUENZA VACCINE  07/01/2019     Advance Directives   What are advance directives? Advance directives are legal documents that state your wishes and plans for medical care  These plans are made ahead of time in case you lose your ability to make decisions for yourself  Advance directives can apply to any medical decision, such as the treatments you want, and if you want to donate organs  What are the types of advance directives? There are many types of advance directives, and each state has rules about how to use them  You may choose a combination of any of the following:  · Living will: This is a written record of the treatment you want  You can also choose which treatments you do not want, which to limit, and which to stop at a certain time  This includes surgery, medicine, IV fluid, and tube feedings  · Durable power of  for healthcare [place] [facility]): This is a written record that states who you want to make healthcare choices for you when you are unable to make them for yourself  This person, called a proxy, is usually a family member or a friend  You may choose more than 1 proxy  · Do not resuscitate (DNR) order:  A DNR order is used in case your heart stops beating or you stop breathing  It is a request not to have certain forms of treatment, such as CPR  A DNR order may be included in other types of advance directives  · Medical directive: This covers the care that you want if you are in a coma, near death, or unable to make decisions for yourself  You can list the treatments you want for each condition  Treatment may include pain medicine, surgery, blood transfusions, dialysis, IV or tube feedings, and a ventilator (breathing machine)  · Values history: This document has questions about your views, beliefs, and how you feel and think about life  This information can help others choose the care that you would choose  Why are advance directives important? An advance directive helps you control your care  Although spoken wishes may be used, it is better to have your wishes written down  Spoken wishes can be misunderstood, or not followed  Treatments may be given even if you do not want them  An advance directive may make it easier for your family to make difficult choices about your care  © Copyright [employer] 2018 Information is for End User's use only and may not be sold, redistributed or otherwise used for commercial purposes  All illustrations and images included in CareNotes® are the copyrighted property of [employer] , [employer]  or [facility] patient is very functional for age  Will give flu shot today and continue current meds as is    Will recheck

## 2019-11-18 ENCOUNTER — OFFICE VISIT (OUTPATIENT)
Dept: URGENT CARE | Facility: CLINIC | Age: 84
End: 2019-11-18
Payer: MEDICARE

## 2019-11-18 VITALS
BODY MASS INDEX: 20.73 KG/M2 | OXYGEN SATURATION: 99 % | DIASTOLIC BLOOD PRESSURE: 81 MMHG | TEMPERATURE: 97.1 F | SYSTOLIC BLOOD PRESSURE: 181 MMHG | HEIGHT: 69 IN | HEART RATE: 71 BPM | RESPIRATION RATE: 18 BRPM | WEIGHT: 140 LBS

## 2019-11-18 DIAGNOSIS — J06.9 ACUTE RESPIRATORY DISEASE: Primary | ICD-10-CM

## 2019-11-18 DIAGNOSIS — R06.02 SHORTNESS OF BREATH: ICD-10-CM

## 2019-11-18 PROCEDURE — G0463 HOSPITAL OUTPT CLINIC VISIT: HCPCS | Performed by: PHYSICIAN ASSISTANT

## 2019-11-18 PROCEDURE — 99213 OFFICE O/P EST LOW 20 MIN: CPT | Performed by: PHYSICIAN ASSISTANT

## 2019-11-18 RX ORDER — PREDNISONE 10 MG/1
TABLET ORAL
Qty: 21 TABLET | Refills: 0 | Status: SHIPPED | OUTPATIENT
Start: 2019-11-18 | End: 2020-07-29 | Stop reason: ALTCHOICE

## 2019-11-18 NOTE — PROGRESS NOTES
[facility]        NAME: Aster Lopez  is a 80 y o  male  : 1930    MRN: [id number]  DATE: 2019  TIME: 10:54 AM    Assessment and Plan   Acute respiratory disease [J06 9]  1  Acute respiratory disease     2  Shortness of breath  predniSONE 10 mg tablet       Wells score 1 5, PE unlikely  Patient Instructions     Prednisone as prescribed  Fluids and rest  Follow up with PCP in 1-2 days  Proceed to  ER if symptoms worsen  Chief Complaint     Chief Complaint   Patient presents with    Cold Like Symptoms     2 weeks with runny nose and cold symptoms mucus          History of Present Illness       PMH of PE after surgery 10-15 years prior  Patient had a ostomy placed years prior for colorectal cancer  No CA tx in the past 6 months  Denies surgery in past 4 weeks, immobilization over 3 days, calf pain/swelling, hemoptysis, prior DVT, clotting disorder, or current CA diagnosis/tx  Patient quit smoking over 30 years ago  He has never had a work-up for COPD  URI    This is a new problem  Episode onset: 2 weeks  The problem has been gradually improving  There has been no fever  Associated symptoms include congestion, rhinorrhea, sinus pain and wheezing (intermittent while sleeping)  Pertinent negatives include no abdominal pain, chest pain, coughing, diarrhea, ear pain, headaches, nausea, rash, sneezing, sore throat or vomiting  He has tried acetaminophen and decongestant for the symptoms  The treatment provided moderate relief  Review of Systems   Review of Systems   Constitutional: Negative for activity change, appetite change, chills and fever  HENT: Positive for congestion, rhinorrhea, sinus pressure and sinus pain  Negative for dental problem, ear discharge, ear pain, facial swelling, postnasal drip, sneezing, sore throat and trouble swallowing  Eyes: Negative for itching     Respiratory: Positive for shortness of breath (going up stairs) and wheezing (intermittent while sleeping)  Negative for cough and chest tightness  Cardiovascular: Negative for chest pain and palpitations  Gastrointestinal: Negative for abdominal pain, constipation, diarrhea, nausea and vomiting  Musculoskeletal: Negative for myalgias  Skin: Negative for rash  Neurological: Negative for dizziness, weakness, light-headedness and headaches           Current Medications       Current Outpatient Medications:     albuterol (PROVENTIL HFA,VENTOLIN HFA) 90 mcg/act inhaler, Inhale 2 puffs every 6 (six) hours as needed for wheezing, Disp: 1 Inhaler, Rfl: 0    aspirin (ECOTRIN LOW STRENGTH) 81 mg EC tablet, Take 81 mg by mouth daily, Disp: , Rfl:     azelastine (ASTELIN) 0 1 % nasal spray, 1 spray into each nostril 2 (two) times a day Use in each nostril as directed, Disp: 1 Bottle, Rfl: 5    digoxin (LANOXIN) 0 125 mg tablet, Take 1 tablet (125 mcg total) by mouth daily, Disp: 90 tablet, Rfl: 3    ketorolac (ACULAR) 0 5 % ophthalmic solution, , Disp: , Rfl: 2    losartan-hydrochlorothiazide (HYZAAR) 50-12 5 mg per tablet, Take 1 tablet by mouth daily, Disp: 90 tablet, Rfl: 3    metoprolol tartrate (LOPRESSOR) 25 mg tablet, Take 1 tablet (25 mg total) by mouth every 12 (twelve) hours, Disp: 90 tablet, Rfl: 3    Multiple Vitamin (MULTIVITAMIN) capsule, Take 1 capsule by mouth daily, Disp: , Rfl:     predniSONE 10 mg tablet, Take 6 pills day one, 5 pills day 2, 4 pills day 3, 3 pills day 4, 2 pills day 5, and 1 pill day 6 , Disp: 21 tablet, Rfl: 0    Current Allergies     Allergies as of 11/18/2019    (No Known Allergies)            The following portions of the patient's history were reviewed and updated as appropriate: allergies, current medications, past family history, past medical history, past social history, past surgical history and problem list      Past Medical History:   Diagnosis Date    At[facility] fibrillation [facility] [address] [facility]     Colorectal cancer ([facility] [address] )     Hypertension     Pneumonia     Rectal carcinoma ([facility] [address] [facility] 7/31/2019       Past Surgical History:   Procedure Laterality Date    COLECTOMY      COLOSTOMY         No family history on file  Medications have been verified  Objective   BP (!) 181/81   Pulse 71   Temp (!) 97 1 °F (36 2 °C)   Resp 18   Ht 5' 9" (1 753 m)   Wt 63 5 kg (140 lb)   SpO2 99%   BMI 20 67 kg/m²        Physical Exam     Physical Exam   Constitutional: He is oriented to person, place, and time  He appears well-developed and well-nourished  No distress  HENT:   Head: Normocephalic  Right Ear: External ear normal    Left Ear: External ear normal    Nose: Nose normal    Mouth/Throat: Oropharynx is clear and moist  No oropharyngeal exudate  No sinus TTP   Eyes: Conjunctivae are normal    Cardiovascular: Normal rate, regular rhythm, normal heart sounds and intact distal pulses  Exam reveals no gallop and no friction rub  No murmur heard  Pulmonary/Chest: Effort normal and breath sounds normal  No respiratory distress  He has no wheezes  He has no rales  He exhibits no tenderness  Lymphadenopathy:     He has no cervical adenopathy  Neurological: He is alert and oriented to person, place, and time  Skin: Skin is warm  He is not diaphoretic  Psychiatric: He has a normal mood and affect  His behavior is normal  Judgment and thought content normal    Vitals reviewed

## 2019-11-18 NOTE — PATIENT INSTRUCTIONS
Prednisone as prescribed  Fluids and rest  Follow up with PCP in 1-2 days  Proceed to  ER if symptoms worsen  Shortness of Breath   WHAT YOU NEED TO KNOW:   Shortness of breath is a feeling that you cannot get enough air when you breathe in  You may have this feeling only during activity, or all the time  Your symptoms can range from mild to severe  Shortness of breath may be a sign of a serious health condition that needs immediate care  DISCHARGE INSTRUCTIONS:   Return to the emergency department if:   · Your signs and symptoms are the same or worse within 24 hours of treatment  · The skin over your ribs or on your neck sinks in when you breathe  · You feel confused or dizzy  Contact your healthcare provider if:   · You have new or worsening symptoms  · You have questions or concerns about your condition or care  Medicines:   · Medicines  may be used to treat the cause of your symptoms  You may need medicine to treat a bacterial infection or reduce anxiety  Other medicines may be used to open your airway, reduce swelling, or remove extra fluid  If you have a heart condition, you may need medicine to help your heart beat more strongly or regularly  · Take your medicine as directed  Contact your healthcare provider if you think your medicine is not helping or if you have side effects  Tell him or her if you are allergic to any medicine  Keep a list of the medicines, vitamins, and herbs you take  Include the amounts, and when and why you take them  Bring the list or the pill bottles to follow-up visits  Carry your medicine list with you in case of an emergency  Manage shortness of breath:   · Create an action plan  You and your healthcare provider can work together to create a plan for how to handle shortness of breath  The plan can include daily activities, treatment changes, and what to do if you have severe breathing problems  · Lean forward on your elbows when you sit    This helps your lungs expand and may make it easier to breathe  · Use pursed-lip breathing any time you feel short of breath  Breathe in through your nose and then slowly breathe out through your mouth with your lips slightly puckered  It should take you twice as long to breathe out as it did to breathe in  · Do not smoke  Nicotine and other chemicals in cigarettes and cigars can cause lung damage and make shortness of breath worse  Ask your healthcare provider for information if you currently smoke and need help to quit  E-cigarettes or smokeless tobacco still contain nicotine  Talk to your healthcare provider before you use these products  · Reach or maintain a healthy weight  Your healthcare provider can help you create a safe weight loss plan if you are overweight  · Exercise as directed  Exercise can help your lungs work more easily  Exercise can also help you lose weight if needed  Try to get at least 30 minutes of exercise most days of the week  Follow up with your healthcare provider or specialist as directed:  Write down your questions so you remember to ask them during your visits  © 2017 [facility] Information is for End User's use only and may not be sold, redistributed or otherwise used for commercial purposes  All illustrations and images included in CareNotes® are the copyrighted property of A D A M , Inc  or [employer]  The above information is an  only  It is not intended as medical advice for individual conditions or treatments  Talk to your doctor, nurse or pharmacist before following any medical regimen to see if it is safe and effective for you  Cold Symptoms   WHAT YOU NEED TO KNOW:   A cold is an infection caused by a virus  The infection causes your upper respiratory system to become inflamed  Common symptoms of a cold include sneezing, dry throat, a stuffy nose, headache, watery eyes, and a cough   Your cough may be dry, or you may cough up mucus  You may also have muscle aches, joint pain, and tiredness  Rarely, you may have a fever  Most colds go away without treatment  DISCHARGE INSTRUCTIONS:   Return to the emergency department if:   · You have increased tiredness and weakness  · You are unable to eat  · Your heart is beating much faster than usual for you  · You see white spots in the back of your throat and your neck is swollen and sore to the touch  · You see pinpoint or larger reddish-purple dots on your skin  Contact your healthcare provider if:   · You have a fever higher than 102°F (38 9°C)  · You have new or worsening shortness of breath  · You have thick nasal drainage for more than 2 days  · Your symptoms do not improve or get worse within 5 days  · You have questions or concerns about your condition or care  Medicines: The following medicines may be suggested by your healthcare provider to decrease your cold symptoms  These medicines are available without a doctor's order  Ask which medicines to take and when to take them  Follow directions  · NSAIDs or acetaminophen  help to bring down a fever or decrease pain  · Decongestants  help decrease nasal stuffiness  · Antihistamines  help decrease sneezing and a runny nose  · Cough suppressants  help decrease how much you cough  · Expectorants  help loosen mucus so you can cough it up  · Take your medicine as directed  Contact your healthcare provider if you think your medicine is not helping or if you have side effects  Tell him of her if you are allergic to any medicine  Keep a list of the medicines, vitamins, and herbs you take  Include the amounts, and when and why you take them  Bring the list or the pill bottles to follow-up visits  Carry your medicine list with you in case of an emergency  Symptom relief:   The following may help relieve cold symptoms, such as a dry throat and congestion:  · Gargle with mouthwash or warm salt water as directed  · Suck on throat lozenges or hard candy  · Use a cold or warm vaporizer or humidifier to ease your breathing  · Rest for at least 2 days and then as needed to decrease tiredness and weakness  · Use petroleum based jelly around your nostrils to decrease irritation from blowing your nose  Drink liquids:  Liquids will help thin and loosen thick mucus so you can cough it up  Liquids will also keep you hydrated  Ask your healthcare provider which liquids are best for you and how much to drink each day  Prevent the spread of germs: You can spread your cold germs to others for at least 3 days after your symptoms start  Wash your hands often  Do not share items, such as eating utensils  Cover your nose and mouth when you cough or sneeze using the crook of your elbow instead of your hands  Throw used tissues in the garbage  Do not smoke:  Smoking may worsen your symptoms and increase the length of time you feel sick  Talk with your healthcare provider if you need help to stop smoking  Follow up with your healthcare provider as directed:  Write down your questions so you remember to ask them during your visits  © 2017 [facility] Information is for End User's use only and may not be sold, redistributed or otherwise used for commercial purposes  All illustrations and images included in CareNotes® are the copyrighted property of [employer]  or Ilia Ruffin  The above information is an  only  It is not intended as medical advice for individual conditions or treatments  Talk to your doctor, nurse or pharmacist before following any medical regimen to see if it is safe and effective for you

## 2020-01-29 ENCOUNTER — OFFICE VISIT (OUTPATIENT)
Dept: FAMILY MEDICINE CLINIC | Facility: CLINIC | Age: 85
End: 2020-01-29
Payer: MEDICARE

## 2020-01-29 VITALS
HEIGHT: 69 IN | SYSTOLIC BLOOD PRESSURE: 130 MMHG | WEIGHT: 136 LBS | BODY MASS INDEX: 20.14 KG/M2 | DIASTOLIC BLOOD PRESSURE: 80 MMHG

## 2020-01-29 DIAGNOSIS — N39.46 MIXED STRESS AND URGE URINARY INCONTINENCE: Chronic | ICD-10-CM

## 2020-01-29 DIAGNOSIS — Z93.3 COLOSTOMY PRESENT (HCC): Chronic | ICD-10-CM

## 2020-01-29 DIAGNOSIS — J30.89 NON-SEASONAL ALLERGIC RHINITIS, UNSPECIFIED TRIGGER: Chronic | ICD-10-CM

## 2020-01-29 DIAGNOSIS — I48.0 PAROXYSMAL ATRIAL FIBRILLATION (HCC): Primary | Chronic | ICD-10-CM

## 2020-01-29 DIAGNOSIS — I10 ESSENTIAL HYPERTENSION: Chronic | ICD-10-CM

## 2020-01-29 LAB
ALBUMIN SERPL BCP-MCNC: 3.9 G/DL (ref 3.5–5)
ANION GAP SERPL CALCULATED.3IONS-SCNC: 3 MMOL/L (ref 4–13)
BUN SERPL-MCNC: 24 MG/DL (ref 5–25)
CALCIUM SERPL-MCNC: 9.4 MG/DL (ref 8.3–10.1)
CHLORIDE SERPL-SCNC: 109 MMOL/L (ref 100–108)
CO2 SERPL-SCNC: 29 MMOL/L (ref 21–32)
CREAT SERPL-MCNC: 1.34 MG/DL (ref 0.6–1.3)
DIGOXIN SERPL-MCNC: 1.1 NG/ML (ref 0.8–2)
GFR SERPL CREATININE-BSD FRML MDRD: 47 ML/MIN/1.73SQ M
GLUCOSE P FAST SERPL-MCNC: 99 MG/DL (ref 65–99)
PHOSPHATE SERPL-MCNC: 3.3 MG/DL (ref 2.3–4.1)
POTASSIUM SERPL-SCNC: 4.8 MMOL/L (ref 3.5–5.3)
SODIUM SERPL-SCNC: 141 MMOL/L (ref 136–145)

## 2020-01-29 PROCEDURE — 99214 OFFICE O/P EST MOD 30 MIN: CPT | Performed by: FAMILY MEDICINE

## 2020-01-29 PROCEDURE — 36415 COLL VENOUS BLD VENIPUNCTURE: CPT | Performed by: FAMILY MEDICINE

## 2020-01-29 PROCEDURE — 80069 RENAL FUNCTION PANEL: CPT | Performed by: FAMILY MEDICINE

## 2020-01-29 PROCEDURE — 80162 ASSAY OF DIGOXIN TOTAL: CPT | Performed by: FAMILY MEDICINE

## 2020-01-29 NOTE — PROGRESS NOTES
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[facility])  Functioning well continue basic care       Diagnoses and all orders for this visit:    Paroxysmal atrial fibrillation ([address] )  -     Digoxin level; Future  -     Digoxin level    Essential hypertension  -     Renal function panel; Future  -     Renal function panel    Non-seasonal allergic rhinitis, unspecified trigger    Mixed stress and urge urinary incontinence    Colostomy present ([facility])          Subjective:      Patient ID: Jordi Elder  is a 80 y o  male  Patient has history of hypertension, rectal carcinoma, allergic rhinitis, paroxysmal atrial fibrillation and urinary incontinence  Overall has been doing well      The following portions of the patient's history were reviewed and updated as appropriate: allergies, current medications, past medical history, past social history, past surgical history and problem list     Review of Systems   Constitutional: Negative for activity change, appetite change, chills, fatigue, fever and unexpected weight change  HENT: Negative for congestion, rhinorrhea, trouble swallowing and voice change  Eyes: Negative for visual disturbance  Respiratory: Negative for apnea, cough, chest tightness and shortness of breath  Cardiovascular: Negative for chest pain, palpitations and leg swelling  Gastrointestinal: Negative for abdominal distention, abdominal pain, constipation and diarrhea  Endocrine: Positive for polyuria (Nocturia times 1-2)  Genitourinary: Negative for difficulty urinating and enuresis  Musculoskeletal: Negative for arthralgias and myalgias  Skin: Negative for rash  Allergic/Immunologic: Negative for environmental allergies  Neurological: Negative for dizziness, weakness, light-headedness, numbness and headaches  Hematological: Negative for adenopathy  Psychiatric/Behavioral: Negative for agitation and sleep disturbance  Objective:      /80 (BP Location: Left arm, Patient Position: Sitting, Cuff Size: Standard)   Ht 5' 9" (1 753 m)   Wt 61 7 kg (136 lb)   BMI 20 08 kg/m²          Physical Exam   Constitutional: He is oriented to person, place, and time  He appears well-developed  HENT:   Head: Normocephalic  Nose: Nose normal    Mouth/Throat: Oropharynx is clear and moist    Eyes: Conjunctivae are normal    Neck: Neck supple  No thyromegaly present  Cardiovascular: Normal rate, regular rhythm and intact distal pulses  Murmur (Holosystolic murmur at left sternal border heart rate is 66 no bruits are noted) heard  Pulmonary/Chest: Effort normal and breath sounds normal    Abdominal: Soft  He exhibits no distension and no mass  There is no tenderness  Musculoskeletal: He exhibits no edema  Lymphadenopathy:     He has no cervical adenopathy  Neurological: He is alert and oriented to person, place, and time  He displays normal reflexes  Coordination normal    Skin: Skin is warm and dry  No rash noted  Psychiatric: He has a normal mood and affect  Nursing note and vitals reviewed

## 2020-01-29 NOTE — PATIENT INSTRUCTIONS
Overall seems to be doing well    Will check renal panel today as well as digoxin level and continue on current meds

## 2020-01-29 NOTE — RESULT ENCOUNTER NOTE
Please call patient and inform of labs the renal function is stable and digoxin level is okay continue current meds

## 2020-02-07 ENCOUNTER — TELEPHONE (OUTPATIENT)
Dept: FAMILY MEDICINE CLINIC | Facility: CLINIC | Age: 85
End: 2020-02-07

## 2020-02-07 DIAGNOSIS — I10 ESSENTIAL HYPERTENSION: ICD-10-CM

## 2020-02-07 RX ORDER — LOSARTAN POTASSIUM AND HYDROCHLOROTHIAZIDE 12.5; 5 MG/1; MG/1
1 TABLET ORAL DAILY
Qty: 90 TABLET | Refills: 3 | Status: SHIPPED | OUTPATIENT
Start: 2020-02-07 | End: 2020-08-17 | Stop reason: SDUPTHER

## 2020-04-29 ENCOUNTER — OFFICE VISIT (OUTPATIENT)
Dept: FAMILY MEDICINE CLINIC | Facility: CLINIC | Age: 85
End: 2020-04-29
Payer: MEDICARE

## 2020-04-29 VITALS
SYSTOLIC BLOOD PRESSURE: 158 MMHG | TEMPERATURE: 97 F | HEIGHT: 69 IN | WEIGHT: 143.4 LBS | BODY MASS INDEX: 21.24 KG/M2 | DIASTOLIC BLOOD PRESSURE: 82 MMHG

## 2020-04-29 DIAGNOSIS — I10 ESSENTIAL HYPERTENSION: Primary | Chronic | ICD-10-CM

## 2020-04-29 DIAGNOSIS — N39.46 MIXED STRESS AND URGE URINARY INCONTINENCE: Chronic | ICD-10-CM

## 2020-04-29 DIAGNOSIS — I48.0 PAROXYSMAL ATRIAL FIBRILLATION (HCC): Chronic | ICD-10-CM

## 2020-04-29 DIAGNOSIS — Z93.3 COLOSTOMY PRESENT (HCC): Chronic | ICD-10-CM

## 2020-04-29 DIAGNOSIS — J30.89 NON-SEASONAL ALLERGIC RHINITIS, UNSPECIFIED TRIGGER: Chronic | ICD-10-CM

## 2020-04-29 PROCEDURE — 3008F BODY MASS INDEX DOCD: CPT | Performed by: FAMILY MEDICINE

## 2020-04-29 PROCEDURE — 3077F SYST BP >= 140 MM HG: CPT | Performed by: FAMILY MEDICINE

## 2020-04-29 PROCEDURE — 4040F PNEUMOC VAC/ADMIN/RCVD: CPT | Performed by: FAMILY MEDICINE

## 2020-04-29 PROCEDURE — 1036F TOBACCO NON-USER: CPT | Performed by: FAMILY MEDICINE

## 2020-04-29 PROCEDURE — 3079F DIAST BP 80-89 MM HG: CPT | Performed by: FAMILY MEDICINE

## 2020-04-29 PROCEDURE — 1160F RVW MEDS BY RX/DR IN RCRD: CPT | Performed by: FAMILY MEDICINE

## 2020-04-29 PROCEDURE — 99214 OFFICE O/P EST MOD 30 MIN: CPT | Performed by: FAMILY MEDICINE

## 2020-07-29 ENCOUNTER — OFFICE VISIT (OUTPATIENT)
Dept: FAMILY MEDICINE CLINIC | Facility: CLINIC | Age: 85
End: 2020-07-29
Payer: MEDICARE

## 2020-07-29 VITALS
WEIGHT: 141 LBS | SYSTOLIC BLOOD PRESSURE: 128 MMHG | BODY MASS INDEX: 20.88 KG/M2 | DIASTOLIC BLOOD PRESSURE: 68 MMHG | HEIGHT: 69 IN

## 2020-07-29 DIAGNOSIS — I48.0 PAROXYSMAL ATRIAL FIBRILLATION (HCC): Chronic | ICD-10-CM

## 2020-07-29 DIAGNOSIS — C20 RECTAL CARCINOMA (HCC): ICD-10-CM

## 2020-07-29 DIAGNOSIS — Z93.3 COLOSTOMY PRESENT (HCC): Chronic | ICD-10-CM

## 2020-07-29 DIAGNOSIS — N39.46 MIXED STRESS AND URGE URINARY INCONTINENCE: Chronic | ICD-10-CM

## 2020-07-29 DIAGNOSIS — L84 CORN OF TOE: Primary | ICD-10-CM

## 2020-07-29 DIAGNOSIS — J30.89 NON-SEASONAL ALLERGIC RHINITIS DUE TO OTHER ALLERGIC TRIGGER: ICD-10-CM

## 2020-07-29 DIAGNOSIS — I10 ESSENTIAL HYPERTENSION: Chronic | ICD-10-CM

## 2020-07-29 PROCEDURE — 99214 OFFICE O/P EST MOD 30 MIN: CPT | Performed by: FAMILY MEDICINE

## 2020-07-29 RX ORDER — AZELASTINE 1 MG/ML
1 SPRAY, METERED NASAL 2 TIMES DAILY
Qty: 1 BOTTLE | Refills: 5 | Status: SHIPPED | OUTPATIENT
Start: 2020-07-29 | End: 2021-04-08 | Stop reason: SDUPTHER

## 2020-07-29 NOTE — PROGRESS NOTES
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[facility])  Doing well  Continue basic care    Non-seasonal allergic rhinitis  Appears stable continue current nasal spray    Mixed stress and urge urinary incontinence  Asymptomatic  Will continue to follow       Diagnoses and all orders for this visit:    Corn of toe    Non-seasonal allergic rhinitis due to other allergic trigger  Comments:  Gave refill for the Astelin to use twice a day  Orders:  -     azelastine (ASTELIN) 0 1 % nasal spray; 1 spray into each nostril 2 (two) times a day Use in each nostril as directed    Rectal carcinoma (HCC)    Essential hypertension    Paroxysmal atrial fibrillation (HCC)    Colostomy present (HCC)    Mixed stress and urge urinary incontinence          Subjective:      Patient ID: Lashonda rGegg  is a 80 y o  male  Patient has history of hypertension, rectal carcinoma, allergic rhinitis, paroxysmal atrial fibrillation and urinary incontinence  Has been having trouble with pain on the inside of the right little toe over the last few weeks  Otherwise has been feeling well      The following portions of the patient's history were reviewed and updated as appropriate: allergies, current medications, past medical history, past social history and problem list       Review of Systems   Constitutional: Negative for activity change, appetite change, chills, fatigue, fever and unexpected weight change  HENT: Negative for congestion, rhinorrhea and trouble swallowing  Eyes: Negative for visual disturbance  Respiratory: Negative for apnea, cough, chest tightness and shortness of breath      Cardiovascular: Negative for chest pain, palpitations and leg swelling  Gastrointestinal: Negative for abdominal distention, abdominal pain, constipation and diarrhea  Endocrine: Negative for polyuria (Nocturia x1)  Genitourinary: Negative for difficulty urinating and enuresis  Musculoskeletal: Negative for arthralgias and myalgias  Skin: Negative for rash  Allergic/Immunologic: Positive for environmental allergies  Neurological: Negative for dizziness, weakness, light-headedness, numbness and headaches  Hematological: Negative for adenopathy  Does not bruise/bleed easily  Psychiatric/Behavioral: Negative for agitation and sleep disturbance  Objective:      /68 (BP Location: Left arm, Patient Position: Sitting, Cuff Size: Standard)   Ht 5' 9" (1 753 m)   Wt 64 kg (141 lb)   BMI 20 82 kg/m²          Physical Exam   Constitutional: He appears well-developed and well-nourished  No distress  HENT:   Head: Normocephalic  Eyes: Pupils are equal, round, and reactive to light  Conjunctivae are normal    Neck: Neck supple  No thyromegaly present  Cardiovascular: Normal rate, regular rhythm and intact distal pulses  Murmur (Soft holosystolic murmur at left sternal border heart rate is 78  No bruits are noted) heard  Pulmonary/Chest: Effort normal and breath sounds normal    Abdominal: Soft  He exhibits no distension and no mass  There is no tenderness  Musculoskeletal: He exhibits no edema  Feet:    Lymphadenopathy:     He has no cervical adenopathy  Neurological: He is alert  He displays normal reflexes  Coordination normal    Skin: Skin is warm and dry  No rash noted  Psychiatric: He has a normal mood and affect  Nursing note and vitals reviewed

## 2020-07-29 NOTE — PATIENT INSTRUCTIONS
Discussed the problem with the pain in the right little toe which I feel relates to corn  Should put cotton between the little toe and the 4th toe on a daily basis and this should go away in about 3-4 weeks  Gave refill for the Astelin    Will continue all meds as is

## 2020-07-29 NOTE — ASSESSMENT & PLAN NOTE
Should keep strip of cotton between the 4th and 5th toe on a daily basis and this should go away after 3 or 4 weeks

## 2020-08-17 DIAGNOSIS — I48.0 PAROXYSMAL ATRIAL FIBRILLATION (HCC): ICD-10-CM

## 2020-08-17 DIAGNOSIS — I10 ESSENTIAL HYPERTENSION: ICD-10-CM

## 2020-08-17 RX ORDER — DIGOXIN 125 MCG
125 TABLET ORAL DAILY
Qty: 90 TABLET | Refills: 0 | Status: SHIPPED | OUTPATIENT
Start: 2020-08-17 | End: 2020-10-29 | Stop reason: SDUPTHER

## 2020-08-17 RX ORDER — LOSARTAN POTASSIUM AND HYDROCHLOROTHIAZIDE 12.5; 5 MG/1; MG/1
1 TABLET ORAL DAILY
Qty: 90 TABLET | Refills: 0 | Status: SHIPPED | OUTPATIENT
Start: 2020-08-17 | End: 2020-10-29 | Stop reason: SDUPTHER

## 2020-08-17 NOTE — TELEPHONE ENCOUNTER
Please send refills to [address] [place] [address] since Dr Shamir Bello is out of the office   Thank you

## 2020-10-05 DIAGNOSIS — I48.0 PAROXYSMAL ATRIAL FIBRILLATION (HCC): ICD-10-CM

## 2020-10-29 ENCOUNTER — OFFICE VISIT (OUTPATIENT)
Dept: FAMILY MEDICINE CLINIC | Facility: CLINIC | Age: 85
End: 2020-10-29
Payer: MEDICARE

## 2020-10-29 VITALS
OXYGEN SATURATION: 98 % | DIASTOLIC BLOOD PRESSURE: 72 MMHG | HEIGHT: 69 IN | HEART RATE: 61 BPM | BODY MASS INDEX: 20.73 KG/M2 | SYSTOLIC BLOOD PRESSURE: 122 MMHG | WEIGHT: 140 LBS

## 2020-10-29 DIAGNOSIS — N39.46 MIXED STRESS AND URGE URINARY INCONTINENCE: Chronic | ICD-10-CM

## 2020-10-29 DIAGNOSIS — Z23 NEEDS FLU SHOT: ICD-10-CM

## 2020-10-29 DIAGNOSIS — Z00.00 MEDICARE ANNUAL WELLNESS VISIT, SUBSEQUENT: ICD-10-CM

## 2020-10-29 DIAGNOSIS — I10 ESSENTIAL HYPERTENSION: ICD-10-CM

## 2020-10-29 DIAGNOSIS — C20 RECTAL CARCINOMA (HCC): Chronic | ICD-10-CM

## 2020-10-29 DIAGNOSIS — I48.0 PAROXYSMAL ATRIAL FIBRILLATION (HCC): Primary | ICD-10-CM

## 2020-10-29 DIAGNOSIS — J30.89 NON-SEASONAL ALLERGIC RHINITIS, UNSPECIFIED TRIGGER: Chronic | ICD-10-CM

## 2020-10-29 PROBLEM — L84 CORN OF TOE: Status: RESOLVED | Noted: 2020-07-29 | Resolved: 2020-10-29

## 2020-10-29 PROCEDURE — 90662 IIV NO PRSV INCREASED AG IM: CPT | Performed by: FAMILY MEDICINE

## 2020-10-29 PROCEDURE — 99214 OFFICE O/P EST MOD 30 MIN: CPT | Performed by: FAMILY MEDICINE

## 2020-10-29 PROCEDURE — G0439 PPPS, SUBSEQ VISIT: HCPCS | Performed by: FAMILY MEDICINE

## 2020-10-29 PROCEDURE — G0008 ADMIN INFLUENZA VIRUS VAC: HCPCS | Performed by: FAMILY MEDICINE

## 2020-10-29 RX ORDER — DIGOXIN 125 MCG
125 TABLET ORAL DAILY
Qty: 90 TABLET | Refills: 3 | Status: SHIPPED | OUTPATIENT
Start: 2020-10-29 | End: 2020-11-23 | Stop reason: SDUPTHER

## 2020-10-29 RX ORDER — LOSARTAN POTASSIUM AND HYDROCHLOROTHIAZIDE 12.5; 5 MG/1; MG/1
1 TABLET ORAL DAILY
Qty: 90 TABLET | Refills: 3 | Status: SHIPPED | OUTPATIENT
Start: 2020-10-29 | End: 2020-11-23 | Stop reason: SDUPTHER

## 2020-11-23 DIAGNOSIS — I10 ESSENTIAL HYPERTENSION: ICD-10-CM

## 2020-11-23 DIAGNOSIS — I48.0 PAROXYSMAL ATRIAL FIBRILLATION (HCC): ICD-10-CM

## 2020-11-23 RX ORDER — LOSARTAN POTASSIUM AND HYDROCHLOROTHIAZIDE 12.5; 5 MG/1; MG/1
1 TABLET ORAL DAILY
Qty: 90 TABLET | Refills: 3 | Status: SHIPPED | OUTPATIENT
Start: 2020-11-23 | End: 2021-12-17 | Stop reason: SDUPTHER

## 2020-11-23 RX ORDER — DIGOXIN 125 MCG
125 TABLET ORAL DAILY
Qty: 90 TABLET | Refills: 3 | Status: SHIPPED | OUTPATIENT
Start: 2020-11-23 | End: 2021-12-17 | Stop reason: SDUPTHER

## 2020-12-14 ENCOUNTER — APPOINTMENT (OUTPATIENT)
Dept: RADIOLOGY | Facility: CLINIC | Age: 85
End: 2020-12-14
Payer: MEDICARE

## 2020-12-14 ENCOUNTER — OFFICE VISIT (OUTPATIENT)
Dept: URGENT CARE | Facility: CLINIC | Age: 85
End: 2020-12-14
Payer: MEDICARE

## 2020-12-14 VITALS
DIASTOLIC BLOOD PRESSURE: 57 MMHG | BODY MASS INDEX: 20.73 KG/M2 | HEART RATE: 90 BPM | OXYGEN SATURATION: 98 % | RESPIRATION RATE: 16 BRPM | TEMPERATURE: 98 F | WEIGHT: 140 LBS | SYSTOLIC BLOOD PRESSURE: 112 MMHG | HEIGHT: 69 IN

## 2020-12-14 DIAGNOSIS — M25.532 WRIST PAIN, ACUTE, LEFT: ICD-10-CM

## 2020-12-14 DIAGNOSIS — L03.114 CELLULITIS OF LEFT WRIST: ICD-10-CM

## 2020-12-14 DIAGNOSIS — M25.532 WRIST PAIN, ACUTE, LEFT: Primary | ICD-10-CM

## 2020-12-14 PROCEDURE — 73110 X-RAY EXAM OF WRIST: CPT

## 2020-12-14 PROCEDURE — 99213 OFFICE O/P EST LOW 20 MIN: CPT | Performed by: EMERGENCY MEDICINE

## 2020-12-14 PROCEDURE — G0463 HOSPITAL OUTPT CLINIC VISIT: HCPCS | Performed by: EMERGENCY MEDICINE

## 2020-12-14 RX ORDER — DOXYCYCLINE 100 MG/1
100 TABLET ORAL 2 TIMES DAILY
Qty: 14 TABLET | Refills: 0 | Status: SHIPPED | OUTPATIENT
Start: 2020-12-14 | End: 2020-12-21

## 2021-03-01 ENCOUNTER — APPOINTMENT (OUTPATIENT)
Dept: LAB | Facility: HOSPITAL | Age: 86
End: 2021-03-01
Attending: FAMILY MEDICINE
Payer: MEDICARE

## 2021-03-01 ENCOUNTER — HOSPITAL ENCOUNTER (OUTPATIENT)
Dept: RADIOLOGY | Facility: HOSPITAL | Age: 86
Discharge: HOME | End: 2021-03-01
Attending: FAMILY MEDICINE
Payer: MEDICARE

## 2021-03-01 ENCOUNTER — OFFICE VISIT (OUTPATIENT)
Dept: FAMILY MEDICINE CLINIC | Facility: CLINIC | Age: 86
End: 2021-03-01
Payer: MEDICARE

## 2021-03-01 VITALS
TEMPERATURE: 98 F | SYSTOLIC BLOOD PRESSURE: 122 MMHG | OXYGEN SATURATION: 93 % | WEIGHT: 131 LBS | BODY MASS INDEX: 19.4 KG/M2 | HEIGHT: 69 IN | HEART RATE: 60 BPM | DIASTOLIC BLOOD PRESSURE: 72 MMHG

## 2021-03-01 DIAGNOSIS — R06.02 SHORTNESS OF BREATH: ICD-10-CM

## 2021-03-01 DIAGNOSIS — I10 ESSENTIAL HYPERTENSION: Chronic | ICD-10-CM

## 2021-03-01 DIAGNOSIS — R06.02 SHORTNESS OF BREATH: Primary | ICD-10-CM

## 2021-03-01 DIAGNOSIS — I48.0 PAROXYSMAL ATRIAL FIBRILLATION (HCC): ICD-10-CM

## 2021-03-01 DIAGNOSIS — Z93.3 COLOSTOMY PRESENT (HCC): ICD-10-CM

## 2021-03-01 DIAGNOSIS — C20 RECTAL CARCINOMA (HCC): ICD-10-CM

## 2021-03-01 DIAGNOSIS — J30.89 NON-SEASONAL ALLERGIC RHINITIS, UNSPECIFIED TRIGGER: Chronic | ICD-10-CM

## 2021-03-01 LAB — D DIMER PPP FEU-MCNC: 1.28 UG/ML FEU

## 2021-03-01 PROCEDURE — 99214 OFFICE O/P EST MOD 30 MIN: CPT | Performed by: FAMILY MEDICINE

## 2021-03-01 PROCEDURE — 85379 FIBRIN DEGRADATION QUANT: CPT

## 2021-03-01 PROCEDURE — 36415 COLL VENOUS BLD VENIPUNCTURE: CPT

## 2021-03-01 PROCEDURE — 71046 X-RAY EXAM CHEST 2 VIEWS: CPT

## 2021-03-01 RX ORDER — MONTELUKAST SODIUM 10 MG/1
10 TABLET ORAL
Qty: 30 TABLET | Refills: 5 | Status: SHIPPED | OUTPATIENT
Start: 2021-03-01 | End: 2021-09-20 | Stop reason: SDUPTHER

## 2021-03-01 NOTE — PATIENT INSTRUCTIONS
Discussed the problem with the wheezing and the shortness of breath at night which I feel probably relates to mild asthmatic component related to the sinus drainage  Will check D-dimer and get chest x-ray  Should use the Astelin nasal spray 1 spray spray each nostril at bedtime and in the morning and will add Singulair    Does seem to be in sinus rhythm at this time will continue other meds as is

## 2021-03-01 NOTE — PROGRESS NOTES
Assessment/Plan:    Shortness of breath   I feel this probably relates to mild asthmatic component from the clear sinus drainage  Will check chest x-ray and D-dimer to screen for congestive heart failure  Will add Singulair 10 mg to be taken daily and will reassess here in 2 weeks    Paroxysmal atrial fibrillation (HCC)   Does appear to be in sinus rhythm today  Continue current regimen    Essential hypertension   Overall stable, continue current regimen    Non-seasonal allergic rhinitis   Be sure to use the Astelin nasal spray at bedtime and in the morning  This should be also helped with the Singulair       Diagnoses and all orders for this visit:    Shortness of breath  -     XR chest pa & lateral; Future  -     D-dimer, quantitative; Future    Colostomy present ([facility] [address] )    Rectal carcinoma (HCC)    Paroxysmal atrial fibrillation (HCC)  -     XR chest pa & lateral; Future  -     D-dimer, quantitative; Future    Non-seasonal allergic rhinitis, unspecified trigger  -     montelukast (SINGULAIR) 10 mg tablet; Take 1 tablet (10 mg total) by mouth daily at bedtime    Essential hypertension          Subjective:      Patient ID: Rosalio Saldana  is a 80 y o  male  Patient has history of hypertension, rectal carcinoma, allergic rhinitis, paroxysmal atrial fibrillation and urinary incontinence  Has been having some trouble with wheezing which is worse when laying down at night and does wake up with some shortness of breath with this  Does continue to have his clear nasal congestion and claims to be taking his Astelin nasal spray      The following portions of the patient's history were reviewed and updated as appropriate: allergies, current medications, past medical history, past social history, past surgical history and problem list       Review of Systems   Constitutional: Negative for activity change, appetite change, chills, fatigue, fever and unexpected weight change  HENT: Positive for congestion  Negative for rhinorrhea and trouble swallowing  Eyes: Negative for visual disturbance  Respiratory: Positive for shortness of breath and wheezing  Negative for apnea, cough and chest tightness  Cardiovascular: Negative for chest pain, palpitations and leg swelling  Gastrointestinal: Negative for abdominal distention, abdominal pain, constipation and diarrhea  Endocrine: Negative for polyuria ( nocturia x1)  Genitourinary: Negative for difficulty urinating  Musculoskeletal: Negative for arthralgias and myalgias  Skin: Negative for rash  Allergic/Immunologic: Positive for environmental allergies  Neurological: Negative for dizziness, weakness, light-headedness, numbness and headaches  Hematological: Negative for adenopathy  Does not bruise/bleed easily  Psychiatric/Behavioral: Positive for sleep disturbance  Negative for agitation  Objective:      /72 (BP Location: Left arm, Patient Position: Sitting, Cuff Size: Standard)   Pulse 60   Temp 98 °F (36 7 °C)   Ht 5' 9" (1 753 m)   Wt 59 4 kg (131 lb)   SpO2 93%   BMI 19 35 kg/m²          Physical Exam  Vitals signs and nursing note reviewed  Constitutional:       Appearance: He is underweight  HENT:      Head: Normocephalic  Right Ear: Tympanic membrane, ear canal and external ear normal       Left Ear: Tympanic membrane, ear canal and external ear normal       Nose: Rhinorrhea ( clear nasal drainage) present  Mouth/Throat:      Mouth: Mucous membranes are moist    Eyes:      Pupils: Pupils are equal, round, and reactive to light  Neck:      Musculoskeletal: Normal range of motion and neck supple  Thyroid: No thyromegaly  Cardiovascular:      Rate and Rhythm: Normal rate and regular rhythm  Heart sounds: Murmur ( soft systolic murmur at left sternal border  Heart rate is 72  Heart sounds are distinct) present     Pulmonary:      Effort: Pulmonary effort is normal       Breath sounds: Wheezing ( traceExpiratory wheezing in all fields) present  No rales  Abdominal:      General: Abdomen is flat  There is no distension  Palpations: Abdomen is soft  There is no mass  Tenderness: There is no abdominal tenderness  Musculoskeletal: Normal range of motion  Lymphadenopathy:      Cervical: No cervical adenopathy  Skin:     General: Skin is warm and dry  Findings: No rash  Neurological:      Mental Status: He is alert and oriented to person, place, and time  Coordination: Coordination normal       Gait: Gait normal       Deep Tendon Reflexes: Reflexes abnormal ( reflexes 1+)     Psychiatric:         Mood and Affect: Mood normal

## 2021-03-01 NOTE — ASSESSMENT & PLAN NOTE
Be sure to use the Astelin nasal spray at bedtime and in the morning    This should be also helped with the Singulair

## 2021-03-03 NOTE — RESULT ENCOUNTER NOTE
Please call patient and inform of normal  Chest x-ray which does not suggest congestive heart failure

## 2021-03-16 ENCOUNTER — OFFICE VISIT (OUTPATIENT)
Dept: FAMILY MEDICINE CLINIC | Facility: CLINIC | Age: 86
End: 2021-03-16
Payer: MEDICARE

## 2021-03-16 ENCOUNTER — TELEPHONE (OUTPATIENT)
Dept: FAMILY MEDICINE CLINIC | Facility: CLINIC | Age: 86
End: 2021-03-16

## 2021-03-16 VITALS
HEART RATE: 70 BPM | TEMPERATURE: 97.8 F | HEIGHT: 69 IN | DIASTOLIC BLOOD PRESSURE: 72 MMHG | WEIGHT: 136 LBS | OXYGEN SATURATION: 95 % | BODY MASS INDEX: 20.14 KG/M2 | SYSTOLIC BLOOD PRESSURE: 122 MMHG

## 2021-03-16 DIAGNOSIS — C20 RECTAL CARCINOMA (HCC): Chronic | ICD-10-CM

## 2021-03-16 DIAGNOSIS — I10 ESSENTIAL HYPERTENSION: Primary | Chronic | ICD-10-CM

## 2021-03-16 DIAGNOSIS — E44.1 MILD PROTEIN-CALORIE MALNUTRITION (HCC): ICD-10-CM

## 2021-03-16 DIAGNOSIS — R06.02 SHORTNESS OF BREATH: ICD-10-CM

## 2021-03-16 DIAGNOSIS — J30.89 NON-SEASONAL ALLERGIC RHINITIS, UNSPECIFIED TRIGGER: Chronic | ICD-10-CM

## 2021-03-16 DIAGNOSIS — I48.0 PAROXYSMAL ATRIAL FIBRILLATION (HCC): Chronic | ICD-10-CM

## 2021-03-16 PROCEDURE — 99214 OFFICE O/P EST MOD 30 MIN: CPT | Performed by: FAMILY MEDICINE

## 2021-03-16 NOTE — PROGRESS NOTES
Assessment/Plan:    Essential hypertension   Overall stable, continue current regimen  Will check renal panel    Paroxysmal atrial fibrillation (HCC)   Overall seems stable  Continue current meds  Will check digoxin level    Rectal carcinoma ([address] )   Has is functioning colostomy  Will recheck CEA  Is no longer following up with GI    Shortness of breath   This seems to have resolved and I feel probably related to his allergic rhinitis  Will continue the Singulair at this time    Non-seasonal allergic rhinitis   Doing better  Continue the Astelin nasal spray as well as the Singulair    Mild protein-calorie malnutrition ([address] )  Malnutrition Findings:        continue to push basic nutrition    BMI Findings: Body mass index is 20 08 kg/m²  Diagnoses and all orders for this visit:    Essential hypertension  -     Renal function panel; Future    Mild protein-calorie malnutrition (HCC)    Paroxysmal atrial fibrillation (HCC)  -     Digoxin level; Future    Rectal carcinoma (HCC)  -     CEA; Future    Shortness of breath    Non-seasonal allergic rhinitis, unspecified trigger          Subjective:      Patient ID: Pili Severino  is a 80 y o  male  Patient has history of hypertension, rectal carcinoma, allergic rhinitis, paroxysmal atrial fibrillation and urinary incontinence had been having some trouble with shortness of breath and chest x-ray was unremarkable  Singulair was added and this seems to have resolved  Is feeling okay at this time      The following portions of the patient's history were reviewed and updated as appropriate: allergies, current medications, past medical history, past social history and problem list       Review of Systems   Constitutional: Negative for activity change, appetite change, chills, fatigue, fever and unexpected weight change  HENT: Negative for congestion, rhinorrhea, trouble swallowing and voice change  Eyes: Negative for visual disturbance  Respiratory: Negative for apnea, cough, chest tightness and shortness of breath  Cardiovascular: Negative for chest pain, palpitations and leg swelling  Gastrointestinal: Negative for abdominal distention, abdominal pain, constipation and diarrhea  Endocrine: Positive for polyuria ( nocturia x2)  Genitourinary: Negative for difficulty urinating and enuresis  Musculoskeletal: Negative for arthralgias and myalgias  Skin: Negative for rash  Allergic/Immunologic: Positive for environmental allergies  Neurological: Negative for dizziness, weakness, light-headedness, numbness and headaches  Hematological: Negative for adenopathy  Psychiatric/Behavioral: Negative for agitation and sleep disturbance  Objective:      /72 (BP Location: Right arm, Patient Position: Sitting, Cuff Size: Standard)   Pulse 70   Temp 97 8 °F (36 6 °C)   Ht 5' 9" (1 753 m)   Wt 61 7 kg (136 lb)   SpO2 95%   BMI 20 08 kg/m²          Physical Exam  Vitals signs and nursing note reviewed  Constitutional:       Appearance: He is underweight  HENT:      Head: Normocephalic  Eyes:      Conjunctiva/sclera: Conjunctivae normal    Neck:      Musculoskeletal: Normal range of motion and neck supple  Thyroid: No thyromegaly  Vascular: No carotid bruit  Cardiovascular:      Rate and Rhythm: Normal rate and regular rhythm  Heart sounds: Murmur ( soft systolic murmur at left sternal border  Heart rate is 72) present  Pulmonary:      Effort: Pulmonary effort is normal       Breath sounds: Normal breath sounds  No wheezing  Abdominal:      General: Abdomen is flat  There is no distension  Palpations: Abdomen is soft  There is no mass  Tenderness: There is no abdominal tenderness  Musculoskeletal: Normal range of motion  Right lower leg: No edema  Left lower leg: No edema  Lymphadenopathy:      Cervical: No cervical adenopathy  Skin:     General: Skin is warm and dry  Findings: No rash  Neurological:      Mental Status: He is alert and oriented to person, place, and time  Coordination: Coordination abnormal       Gait: Gait abnormal       Deep Tendon Reflexes: Reflexes abnormal ( reflexes 1+)     Psychiatric:         Mood and Affect: Mood normal

## 2021-03-16 NOTE — TELEPHONE ENCOUNTER
Phone call from Jan Stafford  Wanted to know if he should be fasting for renal profile  Per Dr Savage Oseguera and drink water and may take meds  Also wanted to know if he had flu vax-he does NOT want covid vaccine  He had high dose flu vax 10/20  Jan Stafford made aware

## 2021-03-16 NOTE — ASSESSMENT & PLAN NOTE
This seems to have resolved and I feel probably related to his allergic rhinitis    Will continue the Singulair at this time

## 2021-03-16 NOTE — ASSESSMENT & PLAN NOTE
Malnutrition Findings:        continue to push basic nutrition    BMI Findings: Body mass index is 20 08 kg/m²

## 2021-03-16 NOTE — PATIENT INSTRUCTIONS
Overall seems to be doing very well  Will get renal panel for history of hypertension, CEA for history of rectal carcinoma and digoxin for digoxin use because of the paroxysmal atrial fibrillation  Continue all meds as is and try to push the walking activity    Is going to get 1st COVID vaccine today

## 2021-03-19 LAB
ALBUMIN SERPL-MCNC: 4 G/DL (ref 3.6–5.1)
BUN SERPL-MCNC: 25 MG/DL (ref 7–25)
BUN/CREAT SERPL: 18 (CALC) (ref 6–22)
CALCIUM SERPL-MCNC: 9.7 MG/DL (ref 8.6–10.3)
CEA SERPL-MCNC: 1.1 NG/ML
CHLORIDE SERPL-SCNC: 102 MMOL/L (ref 98–110)
CO2 SERPL-SCNC: 33 MMOL/L (ref 20–32)
CREAT SERPL-MCNC: 1.42 MG/DL (ref 0.7–1.11)
DIGOXIN SERPL-MCNC: 1.4 MCG/L (ref 0.8–2)
GLUCOSE SERPL-MCNC: 90 MG/DL (ref 65–99)
PHOSPHATE SERPL-MCNC: 3.5 MG/DL (ref 2.1–4.3)
POTASSIUM SERPL-SCNC: 5.2 MMOL/L (ref 3.5–5.3)
SL AMB EGFR AFRICAN AMERICAN: 50 ML/MIN/1.73M2
SL AMB EGFR NON AFRICAN AMERICAN: 43 ML/MIN/1.73M2
SODIUM SERPL-SCNC: 140 MMOL/L (ref 135–146)

## 2021-04-08 DIAGNOSIS — J30.89 NON-SEASONAL ALLERGIC RHINITIS DUE TO OTHER ALLERGIC TRIGGER: ICD-10-CM

## 2021-04-08 RX ORDER — AZELASTINE 1 MG/ML
1 SPRAY, METERED NASAL 2 TIMES DAILY
Qty: 1 BOTTLE | Refills: 5 | Status: SHIPPED | OUTPATIENT
Start: 2021-04-08 | End: 2022-05-10 | Stop reason: SDUPTHER

## 2021-04-19 DIAGNOSIS — I48.0 PAROXYSMAL ATRIAL FIBRILLATION (HCC): ICD-10-CM

## 2021-06-08 ENCOUNTER — TELEPHONE (OUTPATIENT)
Dept: FAMILY MEDICINE CLINIC | Facility: CLINIC | Age: 86
End: 2021-06-08

## 2021-06-08 NOTE — TELEPHONE ENCOUNTER
Phone call from Dav Macias  Wanted Dr Arthur Austin to fill next orders for colostomy supplies sent to [facility]  Received fax 6/8/21  Dr Arthur Austin approved-faxed to [facility] 6/8/21  Done

## 2021-06-15 PROBLEM — N18.31 CHRONIC RENAL FAILURE, STAGE 3A (HCC): Status: ACTIVE | Noted: 2021-06-15

## 2021-06-16 ENCOUNTER — OFFICE VISIT (OUTPATIENT)
Dept: FAMILY MEDICINE CLINIC | Facility: CLINIC | Age: 86
End: 2021-06-16
Payer: MEDICARE

## 2021-06-16 VITALS
WEIGHT: 138 LBS | SYSTOLIC BLOOD PRESSURE: 128 MMHG | HEIGHT: 69 IN | DIASTOLIC BLOOD PRESSURE: 78 MMHG | BODY MASS INDEX: 20.44 KG/M2

## 2021-06-16 DIAGNOSIS — J30.89 NON-SEASONAL ALLERGIC RHINITIS, UNSPECIFIED TRIGGER: Chronic | ICD-10-CM

## 2021-06-16 DIAGNOSIS — C20 RECTAL CARCINOMA (HCC): Chronic | ICD-10-CM

## 2021-06-16 DIAGNOSIS — I10 ESSENTIAL HYPERTENSION: Primary | Chronic | ICD-10-CM

## 2021-06-16 DIAGNOSIS — I48.0 PAROXYSMAL ATRIAL FIBRILLATION (HCC): Chronic | ICD-10-CM

## 2021-06-16 PROCEDURE — 99214 OFFICE O/P EST MOD 30 MIN: CPT | Performed by: FAMILY MEDICINE

## 2021-06-16 NOTE — PATIENT INSTRUCTIONS
Overall exam today looks good  Continue to push the walking activity and will continue current medication  Should go to the pharmacy and get the COVID vaccine

## 2021-06-16 NOTE — PROGRESS NOTES
Assessment/Plan:    Essential hypertension   Overall stable, continue current regimen    Paroxysmal atrial fibrillation (HCC)   Doing well, recent labs okay, continue current regimen    Non-seasonal allergic rhinitis   Asymptomatic at this time, continue the Singulair    Rectal carcinoma (HCC)   Recent  CEA okay and does have functioning colostomy       Diagnoses and all orders for this visit:    Essential hypertension    Paroxysmal atrial fibrillation (HCC)    Non-seasonal allergic rhinitis, unspecified trigger    Rectal carcinoma (HCC)          Subjective:      Patient ID: Mina Vivas  is a 80 y o  male  Patient has history of hypertension, rectal carcinoma, allergic rhinitis, paroxysmal atrial fibrillation and urinary incontinence Overall has been feeling well      The following portions of the patient's history were reviewed and updated as appropriate: allergies, current medications, past medical history, past social history and problem list       Review of Systems   Constitutional: Negative for activity change, appetite change, chills, fatigue, fever and unexpected weight change  HENT: Negative for congestion, rhinorrhea and trouble swallowing  Eyes: Negative for visual disturbance  Respiratory: Negative for apnea, cough, chest tightness and shortness of breath  Cardiovascular: Negative for chest pain, palpitations and leg swelling  Gastrointestinal: Negative for abdominal distention, abdominal pain, constipation and diarrhea  Endocrine: Negative for polyuria ( nocturia x1)  Genitourinary: Negative for difficulty urinating  Musculoskeletal: Negative for arthralgias and myalgias  Skin: Negative for rash  Allergic/Immunologic: Negative for environmental allergies  Neurological: Negative for dizziness, weakness, light-headedness, numbness and headaches  Hematological: Negative for adenopathy  Does not bruise/bleed easily     Psychiatric/Behavioral: Negative for agitation and sleep disturbance  Objective:      /78 (BP Location: Left arm, Patient Position: Sitting, Cuff Size: Standard)   Ht 5' 9" (1 753 m)   Wt 62 6 kg (138 lb)   BMI 20 38 kg/m²          Physical Exam  Vitals and nursing note reviewed  Constitutional:       Appearance: Normal appearance  He is well-developed  HENT:      Head: Normocephalic  Eyes:      Conjunctiva/sclera: Conjunctivae normal    Neck:      Thyroid: No thyromegaly  Vascular: No carotid bruit  Cardiovascular:      Rate and Rhythm: Normal rate and regular rhythm  Heart sounds: Murmur ( soft systolic murmur at left sternal border  Heart rate is 66) heard  Pulmonary:      Effort: Pulmonary effort is normal       Breath sounds: Normal breath sounds  Abdominal:      General: Abdomen is flat  There is no distension  Palpations: Abdomen is soft  There is no mass  Tenderness: There is no abdominal tenderness  Musculoskeletal:         General: Normal range of motion  Cervical back: Normal range of motion and neck supple  Right lower leg: No edema  Left lower leg: No edema  Lymphadenopathy:      Cervical: No cervical adenopathy  Skin:     General: Skin is warm and dry  Findings: No rash  Neurological:      Mental Status: He is alert and oriented to person, place, and time  Motor: No weakness  Coordination: Coordination normal       Gait: Gait normal       Deep Tendon Reflexes: Reflexes normal    Psychiatric:         Mood and Affect: Mood normal          Behavior: Behavior normal          Thought Content:  Thought content normal          Judgment: Judgment normal

## 2021-09-20 ENCOUNTER — OFFICE VISIT (OUTPATIENT)
Dept: FAMILY MEDICINE CLINIC | Facility: CLINIC | Age: 86
End: 2021-09-20
Payer: MEDICARE

## 2021-09-20 VITALS
BODY MASS INDEX: 20.14 KG/M2 | HEART RATE: 76 BPM | SYSTOLIC BLOOD PRESSURE: 128 MMHG | HEIGHT: 69 IN | WEIGHT: 136 LBS | OXYGEN SATURATION: 97 % | DIASTOLIC BLOOD PRESSURE: 72 MMHG

## 2021-09-20 DIAGNOSIS — I48.0 PAROXYSMAL ATRIAL FIBRILLATION (HCC): Chronic | ICD-10-CM

## 2021-09-20 DIAGNOSIS — I10 ESSENTIAL HYPERTENSION: Primary | Chronic | ICD-10-CM

## 2021-09-20 DIAGNOSIS — C20 RECTAL CARCINOMA (HCC): Chronic | ICD-10-CM

## 2021-09-20 DIAGNOSIS — Z23 NEEDS FLU SHOT: ICD-10-CM

## 2021-09-20 DIAGNOSIS — J30.89 NON-SEASONAL ALLERGIC RHINITIS, UNSPECIFIED TRIGGER: Chronic | ICD-10-CM

## 2021-09-20 PROBLEM — R06.02 SHORTNESS OF BREATH: Status: RESOLVED | Noted: 2021-03-01 | Resolved: 2021-09-20

## 2021-09-20 PROCEDURE — 99214 OFFICE O/P EST MOD 30 MIN: CPT | Performed by: FAMILY MEDICINE

## 2021-09-20 PROCEDURE — G0008 ADMIN INFLUENZA VIRUS VAC: HCPCS | Performed by: FAMILY MEDICINE

## 2021-09-20 PROCEDURE — 90662 IIV NO PRSV INCREASED AG IM: CPT | Performed by: FAMILY MEDICINE

## 2021-09-20 RX ORDER — MONTELUKAST SODIUM 10 MG/1
10 TABLET ORAL
Qty: 30 TABLET | Refills: 5 | Status: SHIPPED | OUTPATIENT
Start: 2021-09-20 | End: 2022-03-23 | Stop reason: SDUPTHER

## 2021-09-20 NOTE — PATIENT INSTRUCTIONS
Overall doing very well  Will continue current medications    Will give flu shot today continue to push overall activity

## 2021-09-20 NOTE — PROGRESS NOTES
Assessment/Plan:    Essential hypertension   Overall stable, continue current regimen    Paroxysmal atrial fibrillation (HCC)   Seems in sinus rhythm today, continue current regimen    Non-seasonal allergic rhinitis   Doing well, continue current regimen    Rectal carcinoma (HCC)   Doing well, continue basic care for the colostomy       Diagnoses and all orders for this visit:    Essential hypertension    Needs flu shot  -     influenza vaccine, high-dose, PF 0 7 mL (FLUZONE HIGH-DOSE)    Non-seasonal allergic rhinitis, unspecified trigger  -     montelukast (SINGULAIR) 10 mg tablet; Take 1 tablet (10 mg total) by mouth daily at bedtime    Paroxysmal atrial fibrillation (HCC)    Rectal carcinoma (HCC)          Subjective:      Patient ID: Masha Barreto  is a 80 y o  male  Patient has history of hypertension, rectal carcinoma, allergic rhinitis, paroxysmal atrial fibrillation and urinary incontinence Overall has been feeling well      The following portions of the patient's history were reviewed and updated as appropriate: allergies, current medications, past medical history, past social history, past surgical history and problem list       Review of Systems   Constitutional: Negative for activity change, appetite change, chills, fatigue, fever and unexpected weight change  HENT: Negative for congestion, rhinorrhea and trouble swallowing  Eyes: Negative for visual disturbance  Respiratory: Negative for apnea, cough, chest tightness and shortness of breath  Cardiovascular: Negative for chest pain, palpitations and leg swelling  Gastrointestinal: Negative for abdominal distention, abdominal pain, constipation and diarrhea  Endocrine: Negative for polyuria ( nocturia x1)  Genitourinary: Negative for difficulty urinating  Musculoskeletal: Negative for arthralgias and myalgias  Skin: Negative for rash  Allergic/Immunologic: Negative for environmental allergies     Neurological: Negative for dizziness, weakness, light-headedness, numbness and headaches  Hematological: Negative for adenopathy  Psychiatric/Behavioral: Negative for agitation and sleep disturbance  Objective:      /72 (BP Location: Left arm, Patient Position: Sitting, Cuff Size: Standard)   Pulse 76   Ht 5' 9" (1 753 m)   Wt 61 7 kg (136 lb)   SpO2 97%   BMI 20 08 kg/m²          Physical Exam  Vitals and nursing note reviewed  Constitutional:       Appearance: Normal appearance  He is well-developed  HENT:      Head: Normocephalic  Eyes:      Conjunctiva/sclera: Conjunctivae normal    Neck:      Thyroid: No thyromegaly  Cardiovascular:      Rate and Rhythm: Normal rate and regular rhythm  Heart sounds: Normal heart sounds  No murmur ( rate is 66) heard  Pulmonary:      Effort: Pulmonary effort is normal       Breath sounds: Normal breath sounds  Abdominal:      General: Abdomen is flat  There is no distension ( has functioning colostomy)  Palpations: Abdomen is soft  There is no mass  Tenderness: There is no abdominal tenderness  Musculoskeletal:         General: Normal range of motion  Cervical back: Normal range of motion and neck supple  Right lower leg: No edema  Left lower leg: No edema  Lymphadenopathy:      Cervical: No cervical adenopathy  Skin:     General: Skin is warm and dry  Findings: No rash  Neurological:      Mental Status: He is alert and oriented to person, place, and time  Motor: No weakness        Coordination: Coordination normal       Gait: Gait normal       Deep Tendon Reflexes: Reflexes normal    Psychiatric:         Mood and Affect: Mood normal

## 2021-12-17 DIAGNOSIS — I48.0 PAROXYSMAL ATRIAL FIBRILLATION (HCC): ICD-10-CM

## 2021-12-17 DIAGNOSIS — I10 ESSENTIAL HYPERTENSION: ICD-10-CM

## 2021-12-17 RX ORDER — DIGOXIN 125 MCG
125 TABLET ORAL DAILY
Qty: 90 TABLET | Refills: 1 | Status: SHIPPED | OUTPATIENT
Start: 2021-12-17 | End: 2022-03-23 | Stop reason: SDUPTHER

## 2021-12-17 RX ORDER — LOSARTAN POTASSIUM AND HYDROCHLOROTHIAZIDE 12.5; 5 MG/1; MG/1
1 TABLET ORAL DAILY
Qty: 90 TABLET | Refills: 1 | Status: SHIPPED | OUTPATIENT
Start: 2021-12-17 | End: 2022-03-23 | Stop reason: SDUPTHER

## 2021-12-29 ENCOUNTER — OFFICE VISIT (OUTPATIENT)
Dept: FAMILY MEDICINE CLINIC | Facility: CLINIC | Age: 86
End: 2021-12-29
Payer: MEDICARE

## 2021-12-29 VITALS
HEIGHT: 69 IN | HEART RATE: 72 BPM | BODY MASS INDEX: 20.14 KG/M2 | WEIGHT: 136 LBS | DIASTOLIC BLOOD PRESSURE: 70 MMHG | SYSTOLIC BLOOD PRESSURE: 140 MMHG | OXYGEN SATURATION: 97 %

## 2021-12-29 DIAGNOSIS — J30.89 NON-SEASONAL ALLERGIC RHINITIS, UNSPECIFIED TRIGGER: Chronic | ICD-10-CM

## 2021-12-29 DIAGNOSIS — I10 ESSENTIAL HYPERTENSION: Primary | Chronic | ICD-10-CM

## 2021-12-29 DIAGNOSIS — Z00.00 MEDICARE ANNUAL WELLNESS VISIT, SUBSEQUENT: ICD-10-CM

## 2021-12-29 DIAGNOSIS — N39.46 MIXED STRESS AND URGE URINARY INCONTINENCE: Chronic | ICD-10-CM

## 2021-12-29 DIAGNOSIS — E44.1 MILD PROTEIN-CALORIE MALNUTRITION (HCC): ICD-10-CM

## 2021-12-29 DIAGNOSIS — I48.0 PAROXYSMAL ATRIAL FIBRILLATION (HCC): Chronic | ICD-10-CM

## 2021-12-29 DIAGNOSIS — C20 RECTAL CARCINOMA (HCC): Chronic | ICD-10-CM

## 2021-12-29 DIAGNOSIS — Z93.3 COLOSTOMY PRESENT (HCC): Chronic | ICD-10-CM

## 2021-12-29 PROCEDURE — G0439 PPPS, SUBSEQ VISIT: HCPCS | Performed by: FAMILY MEDICINE

## 2021-12-29 PROCEDURE — 99214 OFFICE O/P EST MOD 30 MIN: CPT | Performed by: FAMILY MEDICINE

## 2021-12-29 PROCEDURE — 1123F ACP DISCUSS/DSCN MKR DOCD: CPT | Performed by: FAMILY MEDICINE

## 2022-03-23 ENCOUNTER — OFFICE VISIT (OUTPATIENT)
Dept: FAMILY MEDICINE CLINIC | Facility: CLINIC | Age: 87
End: 2022-03-23
Payer: MEDICARE

## 2022-03-23 VITALS
HEIGHT: 69 IN | DIASTOLIC BLOOD PRESSURE: 72 MMHG | BODY MASS INDEX: 20.29 KG/M2 | SYSTOLIC BLOOD PRESSURE: 150 MMHG | WEIGHT: 137 LBS

## 2022-03-23 DIAGNOSIS — I10 ESSENTIAL HYPERTENSION: Primary | ICD-10-CM

## 2022-03-23 DIAGNOSIS — N18.31 CHRONIC RENAL FAILURE, STAGE 3A (HCC): ICD-10-CM

## 2022-03-23 DIAGNOSIS — J30.89 NON-SEASONAL ALLERGIC RHINITIS, UNSPECIFIED TRIGGER: Chronic | ICD-10-CM

## 2022-03-23 DIAGNOSIS — C20 RECTAL CARCINOMA (HCC): ICD-10-CM

## 2022-03-23 DIAGNOSIS — I48.0 PAROXYSMAL ATRIAL FIBRILLATION (HCC): ICD-10-CM

## 2022-03-23 PROCEDURE — 99214 OFFICE O/P EST MOD 30 MIN: CPT | Performed by: FAMILY MEDICINE

## 2022-03-23 RX ORDER — DIGOXIN 125 MCG
125 TABLET ORAL DAILY
Qty: 90 TABLET | Refills: 1 | Status: SHIPPED | OUTPATIENT
Start: 2022-03-23 | End: 2022-06-23 | Stop reason: SDUPTHER

## 2022-03-23 RX ORDER — MONTELUKAST SODIUM 10 MG/1
10 TABLET ORAL
Qty: 30 TABLET | Refills: 5 | Status: SHIPPED | OUTPATIENT
Start: 2022-03-23

## 2022-03-23 RX ORDER — LOSARTAN POTASSIUM AND HYDROCHLOROTHIAZIDE 12.5; 5 MG/1; MG/1
1 TABLET ORAL DAILY
Qty: 90 TABLET | Refills: 1 | Status: SHIPPED | OUTPATIENT
Start: 2022-03-23 | End: 2022-06-23 | Stop reason: SDUPTHER

## 2022-03-23 NOTE — PATIENT INSTRUCTIONS
Overall seems to be doing well  Will check renal panel for history of hypertension, CEA for history of colon carcinoma and digoxin level because of history of atrial fibrillation  I did advised to consider getting the COVID vaccine    Continue to push the walking activity

## 2022-03-23 NOTE — ASSESSMENT & PLAN NOTE
Lab Results   Component Value Date    EGFR 47 01/29/2020    CREATININE 1 42 (H) 03/18/2021    CREATININE 1 34 (H) 01/29/2020    Will recheck renal panel and continue to follow

## 2022-03-23 NOTE — ASSESSMENT & PLAN NOTE
Borderline elevation of systolic pressure today  Will maintain on current regimen    Will check renal panel today

## 2022-03-23 NOTE — PROGRESS NOTES
Assessment/Plan:    Essential hypertension  Borderline elevation of systolic pressure today  Will maintain on current regimen  Will check renal panel today    Paroxysmal atrial fibrillation (HCC)  Has been stable  Will continue current regimen and will check digoxin level    Non-seasonal allergic rhinitis  Doing well, continue topical nasal spray    Rectal carcinoma ([facility] [address] )  Will recheck CEA and continue to follow    Chronic renal failure, stage 3a ([facility] [address] )  Lab Results   Component Value Date    EGFR 47 01/29/2020    CREATININE 1 42 (H) 03/18/2021    CREATININE 1 34 (H) 01/29/2020    Will recheck renal panel and continue to follow       Diagnoses and all orders for this visit:    Essential hypertension  Comments:  Overall doing well continue current regimen  Orders:  -     losartan-hydrochlorothiazide (HYZAAR) 50-12 5 mg per tablet; Take 1 tablet by mouth daily  -     Renal function panel; Future    Non-seasonal allergic rhinitis, unspecified trigger  -     montelukast (SINGULAIR) 10 mg tablet; Take 1 tablet (10 mg total) by mouth daily at bedtime    Paroxysmal atrial fibrillation (HCC)  Comments: In sinus rhythm today overall doing well continue baseline meds  Orders:  -     digoxin (LANOXIN) 0 125 mg tablet; Take 1 tablet (125 mcg total) by mouth daily  -     Digoxin level; Future    Rectal carcinoma (HCC)  -     CEA; Future    Chronic renal failure, stage 3a (HCC)          Subjective:      Patient ID: Savanah Remy  is a 80 y o  male      Patient has history of hypertension, rectal carcinoma, allergic rhinitis, paroxysmal atrial fibrillation and urinary incontinence Overall has been feeling well      The following portions of the patient's history were reviewed and updated as appropriate: allergies, current medications, past medical history, past social history and problem list       Review of Systems   Constitutional: Negative for activity change, appetite change, chills, fatigue, fever and unexpected weight change  HENT: Positive for hearing loss  Negative for congestion, rhinorrhea and trouble swallowing  Eyes: Negative for visual disturbance  Respiratory: Negative for apnea, cough, chest tightness and shortness of breath  Cardiovascular: Negative for chest pain, palpitations and leg swelling  Gastrointestinal: Negative for abdominal distention, abdominal pain, constipation and diarrhea  Endocrine: Negative for polyuria (Nocturia x1)  Genitourinary: Negative for difficulty urinating and enuresis  Musculoskeletal: Negative for arthralgias and myalgias  Skin: Negative for rash  Allergic/Immunologic: Negative for environmental allergies  Neurological: Negative for dizziness, weakness, light-headedness, numbness and headaches  Hematological: Negative for adenopathy  Psychiatric/Behavioral: Negative for agitation and sleep disturbance  Objective:      /72 (BP Location: Left arm, Patient Position: Sitting, Cuff Size: Standard)   Ht 5' 9" (1 753 m)   Wt 62 1 kg (137 lb)   BMI 20 23 kg/m²          Physical Exam  Vitals and nursing note reviewed  Constitutional:       Appearance: Normal appearance  He is well-developed  HENT:      Head: Normocephalic  Eyes:      Conjunctiva/sclera: Conjunctivae normal    Neck:      Thyroid: No thyromegaly  Vascular: No carotid bruit  Cardiovascular:      Rate and Rhythm: Normal rate and regular rhythm  Heart sounds: Heart sounds are distant  No murmur (Rate is 78) heard  Pulmonary:      Effort: Pulmonary effort is normal       Breath sounds: Normal breath sounds  Abdominal:      General: Abdomen is flat  There is no distension  Palpations: Abdomen is soft  There is no mass  Tenderness: There is no abdominal tenderness  Musculoskeletal:         General: Normal range of motion  Cervical back: Normal range of motion and neck supple  No tenderness  Right lower leg: No edema        Left lower leg: No edema    Lymphadenopathy:      Cervical: No cervical adenopathy  Skin:     General: Skin is warm and dry  Findings: No rash  Neurological:      Mental Status: He is alert  Motor: Weakness present        Coordination: Coordination abnormal       Gait: Gait abnormal       Deep Tendon Reflexes: Reflexes abnormal    Psychiatric:         Mood and Affect: Mood normal

## 2022-03-29 LAB
ALBUMIN SERPL-MCNC: 4.3 G/DL (ref 3.6–5.1)
BUN SERPL-MCNC: 32 MG/DL (ref 7–25)
BUN/CREAT SERPL: 22 (CALC) (ref 6–22)
CALCIUM SERPL-MCNC: 9.6 MG/DL (ref 8.6–10.3)
CEA SERPL-MCNC: 1.4 NG/ML
CHLORIDE SERPL-SCNC: 106 MMOL/L (ref 98–110)
CO2 SERPL-SCNC: 26 MMOL/L (ref 20–32)
CREAT SERPL-MCNC: 1.48 MG/DL (ref 0.7–1.11)
DIGOXIN SERPL-MCNC: 0.6 MCG/L (ref 0.8–2)
GLUCOSE SERPL-MCNC: 97 MG/DL (ref 65–99)
PHOSPHATE SERPL-MCNC: 3.4 MG/DL (ref 2.1–4.3)
POTASSIUM SERPL-SCNC: 4.1 MMOL/L (ref 3.5–5.3)
SL AMB EGFR AFRICAN AMERICAN: 47 ML/MIN/1.73M2
SL AMB EGFR NON AFRICAN AMERICAN: 40 ML/MIN/1.73M2
SODIUM SERPL-SCNC: 141 MMOL/L (ref 135–146)

## 2022-03-30 NOTE — RESULT ENCOUNTER NOTE
Please call the patient regarding his abnormal result  The digoxin level was slightly low but I will not adjust medication at this time    I feel other labs are stable and should continue on current medication

## 2022-05-06 DIAGNOSIS — I48.0 PAROXYSMAL ATRIAL FIBRILLATION (HCC): ICD-10-CM

## 2022-05-10 DIAGNOSIS — J30.89 NON-SEASONAL ALLERGIC RHINITIS DUE TO OTHER ALLERGIC TRIGGER: ICD-10-CM

## 2022-05-10 RX ORDER — AZELASTINE 1 MG/ML
1 SPRAY, METERED NASAL 2 TIMES DAILY
Qty: 5 ML | Refills: 5 | Status: SHIPPED | OUTPATIENT
Start: 2022-05-10

## 2022-06-16 ENCOUNTER — RA CDI HCC (OUTPATIENT)
Dept: OTHER | Facility: HOSPITAL | Age: 87
End: 2022-06-16

## 2022-06-16 NOTE — PROGRESS NOTES
[address]  coding opportunities       Chart reviewed, no opportunity found: CHART REVIEWED, NO OPPORTUNITY FOUND        Patients Insurance     Medicare Insurance: Medicare

## 2022-06-23 ENCOUNTER — OFFICE VISIT (OUTPATIENT)
Dept: FAMILY MEDICINE CLINIC | Facility: CLINIC | Age: 87
End: 2022-06-23
Payer: MEDICARE

## 2022-06-23 VITALS
BODY MASS INDEX: 20.59 KG/M2 | DIASTOLIC BLOOD PRESSURE: 70 MMHG | SYSTOLIC BLOOD PRESSURE: 130 MMHG | HEIGHT: 69 IN | OXYGEN SATURATION: 98 % | WEIGHT: 139 LBS | HEART RATE: 56 BPM

## 2022-06-23 DIAGNOSIS — I10 ESSENTIAL HYPERTENSION: Primary | ICD-10-CM

## 2022-06-23 DIAGNOSIS — J30.89 NON-SEASONAL ALLERGIC RHINITIS, UNSPECIFIED TRIGGER: Chronic | ICD-10-CM

## 2022-06-23 DIAGNOSIS — I48.0 PAROXYSMAL ATRIAL FIBRILLATION (HCC): ICD-10-CM

## 2022-06-23 DIAGNOSIS — C20 RECTAL CARCINOMA (HCC): Chronic | ICD-10-CM

## 2022-06-23 DIAGNOSIS — N18.31 CHRONIC RENAL FAILURE, STAGE 3A (HCC): ICD-10-CM

## 2022-06-23 PROCEDURE — 99214 OFFICE O/P EST MOD 30 MIN: CPT | Performed by: FAMILY MEDICINE

## 2022-06-23 RX ORDER — LOSARTAN POTASSIUM AND HYDROCHLOROTHIAZIDE 12.5; 5 MG/1; MG/1
1 TABLET ORAL DAILY
Qty: 90 TABLET | Refills: 1 | Status: SHIPPED | OUTPATIENT
Start: 2022-06-23

## 2022-06-23 RX ORDER — DIGOXIN 125 MCG
125 TABLET ORAL DAILY
Qty: 90 TABLET | Refills: 1 | Status: SHIPPED | OUTPATIENT
Start: 2022-06-23

## 2022-06-23 NOTE — ASSESSMENT & PLAN NOTE
Lab Results   Component Value Date    EGFR 47 01/29/2020    CREATININE 1 48 (H) 03/28/2022    CREATININE 1 42 (H) 03/18/2021    CREATININE 1 34 (H) 01/29/2020    Mildly decreasing    Will continue to follow

## 2022-06-23 NOTE — PATIENT INSTRUCTIONS
Overall seems to be doing well  Sent prescription for the losartan and digoxin over to pharmacy  Continue all meds as is    Try to push a little walking activity

## 2022-06-23 NOTE — PROGRESS NOTES
Assessment/Plan:    Essential hypertension  Overall stable, continue current regimen  Gave refill for the losartan    Non-seasonal allergic rhinitis  Doing well, continue p r n  Medication    Paroxysmal atrial fibrillation (HCC)  Seems in sinus rhythm today, continue current regimen    Rectal carcinoma (HCC)  Recent CEA was okay  Will continue to follow    Chronic renal failure, stage 3a [facility])  Lab Results   Component Value Date    EGFR 47 01/29/2020    CREATININE 1 48 (H) 03/28/2022    CREATININE 1 42 (H) 03/18/2021    CREATININE 1 34 (H) 01/29/2020    Mildly decreasing  Will continue to follow       Diagnoses and all orders for this visit:    Essential hypertension  Comments:  Overall doing well continue current regimen  Orders:  -     losartan-hydrochlorothiazide (HYZAAR) 50-12 5 mg per tablet; Take 1 tablet by mouth daily    Paroxysmal atrial fibrillation (HCC)  Comments: In sinus rhythm today overall doing well continue baseline meds  Orders:  -     digoxin (LANOXIN) 0 125 mg tablet; Take 1 tablet (125 mcg total) by mouth daily    Non-seasonal allergic rhinitis, unspecified trigger    Rectal carcinoma (HCC)    Chronic renal failure, stage 3a (HCC)          Subjective:      Patient ID: Marito Zamudio  is a 80 y o  male  Patient has history of hypertension, rectal carcinoma, functional colostomy, allergic rhinitis, paroxysmal atrial fibrillation and urinary incontinence Overall has been feeling well      The following portions of the patient's history were reviewed and updated as appropriate: allergies, current medications, past medical history, past social history and problem list       Review of Systems   Constitutional: Negative for activity change, appetite change, chills, fatigue, fever and unexpected weight change  HENT: Negative for congestion, rhinorrhea and trouble swallowing  Eyes: Negative for visual disturbance     Respiratory: Negative for apnea, cough, chest tightness and shortness of breath  Cardiovascular: Negative for chest pain, palpitations and leg swelling  Gastrointestinal: Negative for abdominal distention, abdominal pain, constipation ( has functioning colostomy) and diarrhea  Endocrine: Negative for polyuria (Nocturia x1)  Genitourinary: Negative for difficulty urinating  Musculoskeletal: Negative for arthralgias and myalgias  Skin: Negative for rash  Allergic/Immunologic: Positive for environmental allergies  Neurological: Negative for dizziness, weakness, light-headedness, numbness and headaches  Hematological: Negative for adenopathy  Psychiatric/Behavioral: Negative for agitation and sleep disturbance  Objective:      /70 (BP Location: Left arm, Patient Position: Sitting, Cuff Size: Standard)   Pulse 56   Ht 5' 9" (1 753 m)   Wt 63 kg (139 lb)   SpO2 98%   BMI 20 53 kg/m²          Physical Exam  Vitals and nursing note reviewed  Constitutional:       Appearance: Normal appearance  He is underweight  HENT:      Head: Normocephalic  Eyes:      Conjunctiva/sclera: Conjunctivae normal    Neck:      Thyroid: No thyromegaly  Vascular: No carotid bruit  Cardiovascular:      Rate and Rhythm: Normal rate and regular rhythm  Heart sounds: Murmur (Soft systolic murmur at left sternal border  Heart rate is 78) heard  Pulmonary:      Effort: Pulmonary effort is normal       Breath sounds: Normal breath sounds  Abdominal:      General: Abdomen is flat  There is no distension  Palpations: Abdomen is soft  There is no mass  Tenderness: There is no abdominal tenderness  Musculoskeletal:         General: Normal range of motion  Cervical back: Normal range of motion and neck supple  No tenderness  Right lower leg: No edema  Left lower leg: No edema  Lymphadenopathy:      Cervical: No cervical adenopathy  Skin:     General: Skin is warm and dry  Findings: No rash     Neurological:      Mental Status: He is alert and oriented to person, place, and time  Motor: Weakness ( in the legs) present        Coordination: Coordination normal       Gait: Gait normal       Deep Tendon Reflexes: Reflexes normal    Psychiatric:         Mood and Affect: Mood normal

## 2022-09-21 ENCOUNTER — OFFICE VISIT (OUTPATIENT)
Dept: FAMILY MEDICINE CLINIC | Facility: CLINIC | Age: 87
End: 2022-09-21
Payer: MEDICARE

## 2022-09-21 VITALS
SYSTOLIC BLOOD PRESSURE: 126 MMHG | DIASTOLIC BLOOD PRESSURE: 70 MMHG | BODY MASS INDEX: 20.14 KG/M2 | HEIGHT: 69 IN | OXYGEN SATURATION: 97 % | WEIGHT: 136 LBS | HEART RATE: 63 BPM

## 2022-09-21 DIAGNOSIS — Z93.3 COLOSTOMY PRESENT (HCC): ICD-10-CM

## 2022-09-21 DIAGNOSIS — E44.1 MILD PROTEIN-CALORIE MALNUTRITION (HCC): ICD-10-CM

## 2022-09-21 DIAGNOSIS — L30.9 DERMATITIS: Primary | ICD-10-CM

## 2022-09-21 PROCEDURE — 99213 OFFICE O/P EST LOW 20 MIN: CPT | Performed by: FAMILY MEDICINE

## 2022-09-21 RX ORDER — TRIAMCINOLONE ACETONIDE 1 MG/G
CREAM TOPICAL 2 TIMES DAILY
Qty: 30 G | Refills: 1 | Status: SHIPPED | OUTPATIENT
Start: 2022-09-21

## 2022-09-21 NOTE — PROGRESS NOTES
Assessment/Plan:       1  Dermatitis  Comments:  possible  shingles but 10 days out  cover with triamcinolone  Orders:  -     triamcinolone (KENALOG) 0 1 % cream; Apply topically 2 (two) times a day    2  Colostomy present ([address] )    3  Mild protein-calorie malnutrition (HCC)          Subjective:      Patient ID: Tess Skinner  is a 80 y o  male  The patient has a 10 day history of rash behind his left knee without any history of new exposure or trauma or injury  It does not itch or hurt  It is draining  No systemic      The following portions of the patient's history were reviewed and updated as appropriate: allergies, current medications, past family history, past medical history, past social history, past surgical history, and problem list     Review of Systems   Respiratory: Negative  Cardiovascular: Negative  Gastrointestinal: Negative  Objective:      /70 (BP Location: Right arm, Patient Position: Sitting, Cuff Size: Standard)   Pulse 63   Ht 5' 9" (1 753 m)   Wt 61 7 kg (136 lb)   SpO2 97%   BMI 20 08 kg/m²          Physical Exam  Vitals and nursing note reviewed  Constitutional:       Appearance: Normal appearance  HENT:      Head: Normocephalic and atraumatic  Nose: Nose normal       Mouth/Throat:      Mouth: Mucous membranes are moist    Eyes:      Extraocular Movements: Extraocular movements intact  Pupils: Pupils are equal, round, and reactive to light  Cardiovascular:      Rate and Rhythm: Normal rate and regular rhythm  Pulses: Normal pulses  Pulmonary:      Effort: Pulmonary effort is normal       Breath sounds: Normal breath sounds  Abdominal:      General: Bowel sounds are normal       Palpations: Abdomen is soft  Musculoskeletal:      Cervical back: Normal range of motion  Skin:     General: Skin is warm and dry  Capillary Refill: Capillary refill takes less than 2 seconds  Comments:  In the left popliteal fossa there is a patch of vesicles on an erythematous base that is weeping   Neurological:      General: No focal deficit present  Mental Status: He is alert     Psychiatric:         Mood and Affect: Mood normal

## 2022-09-26 ENCOUNTER — OFFICE VISIT (OUTPATIENT)
Dept: FAMILY MEDICINE CLINIC | Facility: CLINIC | Age: 87
End: 2022-09-26
Payer: MEDICARE

## 2022-09-26 VITALS
BODY MASS INDEX: 20.29 KG/M2 | HEIGHT: 69 IN | WEIGHT: 137 LBS | HEART RATE: 65 BPM | SYSTOLIC BLOOD PRESSURE: 130 MMHG | OXYGEN SATURATION: 96 % | DIASTOLIC BLOOD PRESSURE: 80 MMHG

## 2022-09-26 DIAGNOSIS — J30.89 NON-SEASONAL ALLERGIC RHINITIS, UNSPECIFIED TRIGGER: Chronic | ICD-10-CM

## 2022-09-26 DIAGNOSIS — I10 ESSENTIAL HYPERTENSION: Primary | Chronic | ICD-10-CM

## 2022-09-26 DIAGNOSIS — I48.0 PAROXYSMAL ATRIAL FIBRILLATION (HCC): Chronic | ICD-10-CM

## 2022-09-26 DIAGNOSIS — Z93.3 COLOSTOMY PRESENT (HCC): Chronic | ICD-10-CM

## 2022-09-26 DIAGNOSIS — N39.46 MIXED STRESS AND URGE URINARY INCONTINENCE: Chronic | ICD-10-CM

## 2022-09-26 DIAGNOSIS — Z23 NEEDS FLU SHOT: ICD-10-CM

## 2022-09-26 PROCEDURE — 90662 IIV NO PRSV INCREASED AG IM: CPT | Performed by: FAMILY MEDICINE

## 2022-09-26 PROCEDURE — G0008 ADMIN INFLUENZA VIRUS VAC: HCPCS | Performed by: FAMILY MEDICINE

## 2022-09-26 PROCEDURE — 99214 OFFICE O/P EST MOD 30 MIN: CPT | Performed by: FAMILY MEDICINE

## 2022-09-26 NOTE — ASSESSMENT & PLAN NOTE
Seems to be doing well but does have the continued left eye irritation    Be very careful about not rubbing the eye and does use the ointment given by Ophthalmology

## 2022-09-26 NOTE — PROGRESS NOTES
Name: Savanah Remy  : 1930      MRN: [id number]  Encounter Provider: Linda Del Rosario MD  Encounter Date: 2022   Encounter department: [address] [place] [address] PRIMARY CARE    Assessment & Plan     1  Essential hypertension  Assessment & Plan:  Overall stable, continue current regimen      2  Needs flu shot  -     influenza vaccine, high-dose, PF 0 7 mL (FLUZONE HIGH-DOSE)    3  Non-seasonal allergic rhinitis, unspecified trigger  Assessment & Plan:  Seems to be doing well but does have the continued left eye irritation  Be very careful about not rubbing the eye and does use the ointment given by Ophthalmology      4  Paroxysmal atrial fibrillation [facility])  Assessment & Plan:  Doing well, continue current regimen      5  Colostomy present [facility])  Assessment & Plan:  Doing well, continue basic skin care      6  Mixed stress and urge urinary incontinence  Assessment & Plan:  Doing well at this time  Will continue to follow           Subjective      Patient has history of hypertension, rectal carcinoma, functional colostomy, allergic rhinitis, paroxysmal atrial fibrillation and urinary incontinence Overall has been feeling well  Has question is continues to need dressing over the area behind the left knee that was irritated    Review of Systems   Constitutional: Negative for activity change, appetite change, chills, fatigue, fever and unexpected weight change  HENT: Negative for congestion, rhinorrhea and trouble swallowing  Eyes: Positive for itching  Negative for visual disturbance  Respiratory: Negative for apnea, cough, chest tightness and shortness of breath  Cardiovascular: Negative for chest pain, palpitations and leg swelling  Gastrointestinal: Negative for abdominal distention, abdominal pain, constipation and diarrhea  Endocrine: Negative for polyuria (Nocturia times 0 to 1)  Genitourinary: Negative for difficulty urinating and enuresis  Musculoskeletal: Negative for arthralgias and myalgias  Skin: Negative for rash  Allergic/Immunologic: Negative for environmental allergies  Neurological: Negative for dizziness, weakness, light-headedness, numbness and headaches  Hematological: Negative for adenopathy  Psychiatric/Behavioral: Negative for agitation and sleep disturbance  Current Outpatient Medications on File Prior to Visit   Medication Sig    albuterol (PROVENTIL HFA,VENTOLIN HFA) 90 mcg/act inhaler Inhale 2 puffs every 6 (six) hours as needed for wheezing    aspirin (ECOTRIN LOW STRENGTH) 81 mg EC tablet Take 81 mg by mouth daily    azelastine (ASTELIN) 0 1 % nasal spray 1 spray into each nostril 2 (two) times a day Use in each nostril as directed    digoxin (LANOXIN) 0 125 mg tablet Take 1 tablet (125 mcg total) by mouth daily    ketorolac (ACULAR) 0 5 % ophthalmic solution     losartan-hydrochlorothiazide (HYZAAR) 50-12 5 mg per tablet Take 1 tablet by mouth daily    metoprolol tartrate (LOPRESSOR) 25 mg tablet Take 1 tablet (25 mg total) by mouth every 12 (twelve) hours    montelukast (SINGULAIR) 10 mg tablet Take 1 tablet (10 mg total) by mouth daily at bedtime    Multiple Vitamin (MULTIVITAMIN) capsule Take 1 capsule by mouth daily    triamcinolone (KENALOG) 0 1 % cream Apply topically 2 (two) times a day       Objective     /80 (BP Location: Left arm, Patient Position: Sitting, Cuff Size: Standard)   Pulse 65   Ht 5' 9" (1 753 m)   Wt 62 1 kg (137 lb)   SpO2 96%   BMI 20 23 kg/m²     Physical Exam  Vitals and nursing note reviewed  Constitutional:       Appearance: Normal appearance  He is well-developed  HENT:      Head: Normocephalic  Eyes:      Conjunctiva/sclera: Conjunctivae normal    Neck:      Thyroid: No thyromegaly  Vascular: No carotid bruit  Cardiovascular:      Rate and Rhythm: Normal rate and regular rhythm        Heart sounds: Murmur (Soft systolic murmur at left sternal border  Heart rate is 72) heard  Pulmonary:      Effort: Pulmonary effort is normal       Breath sounds: Normal breath sounds  Abdominal:      General: Abdomen is flat  There is distension ( does have functioning colostomy bag)  Palpations: Abdomen is soft  There is no mass  Tenderness: There is no abdominal tenderness  Musculoskeletal:         General: Normal range of motion  Cervical back: Normal range of motion and neck supple  No tenderness  Right lower leg: No edema  Left lower leg: No edema  Lymphadenopathy:      Cervical: No cervical adenopathy  Skin:     General: Skin is warm and dry  Findings: No rash (The area behind the left knee looks clear today and does not need to continue to use dressing)  Neurological:      Mental Status: He is alert and oriented to person, place, and time  Motor: No weakness        Coordination: Coordination normal       Gait: Gait normal       Deep Tendon Reflexes: Reflexes normal    Psychiatric:         Mood and Affect: Mood normal        Emeka Dubois MD

## 2022-10-04 DIAGNOSIS — J30.89 NON-SEASONAL ALLERGIC RHINITIS, UNSPECIFIED TRIGGER: Chronic | ICD-10-CM

## 2022-10-04 RX ORDER — MONTELUKAST SODIUM 10 MG/1
10 TABLET ORAL
Qty: 90 TABLET | Refills: 1 | Status: SHIPPED | OUTPATIENT
Start: 2022-10-04

## 2022-12-05 DIAGNOSIS — I48.0 PAROXYSMAL ATRIAL FIBRILLATION (HCC): ICD-10-CM

## 2022-12-21 DIAGNOSIS — J30.89 NON-SEASONAL ALLERGIC RHINITIS DUE TO OTHER ALLERGIC TRIGGER: ICD-10-CM

## 2022-12-21 RX ORDER — AZELASTINE 1 MG/ML
1 SPRAY, METERED NASAL 2 TIMES DAILY
Qty: 5 ML | Refills: 5 | Status: SHIPPED | OUTPATIENT
Start: 2022-12-21

## 2023-01-03 ENCOUNTER — OFFICE VISIT (OUTPATIENT)
Dept: FAMILY MEDICINE CLINIC | Facility: CLINIC | Age: 88
End: 2023-01-03

## 2023-01-03 VITALS
OXYGEN SATURATION: 98 % | SYSTOLIC BLOOD PRESSURE: 120 MMHG | DIASTOLIC BLOOD PRESSURE: 70 MMHG | WEIGHT: 138 LBS | HEART RATE: 63 BPM | BODY MASS INDEX: 20.44 KG/M2 | HEIGHT: 69 IN

## 2023-01-03 DIAGNOSIS — I10 ESSENTIAL HYPERTENSION: Primary | ICD-10-CM

## 2023-01-03 DIAGNOSIS — E44.1 MILD PROTEIN-CALORIE MALNUTRITION (HCC): ICD-10-CM

## 2023-01-03 DIAGNOSIS — Z93.3 COLOSTOMY PRESENT (HCC): ICD-10-CM

## 2023-01-03 DIAGNOSIS — Z00.00 MEDICARE ANNUAL WELLNESS VISIT, SUBSEQUENT: ICD-10-CM

## 2023-01-03 DIAGNOSIS — N18.31 CHRONIC RENAL FAILURE, STAGE 3A (HCC): ICD-10-CM

## 2023-01-03 DIAGNOSIS — C20 RECTAL CARCINOMA (HCC): ICD-10-CM

## 2023-01-03 DIAGNOSIS — I48.0 PAROXYSMAL ATRIAL FIBRILLATION (HCC): ICD-10-CM

## 2023-01-03 DIAGNOSIS — J30.89 NON-SEASONAL ALLERGIC RHINITIS, UNSPECIFIED TRIGGER: Chronic | ICD-10-CM

## 2023-01-03 RX ORDER — DIGOXIN 125 MCG
125 TABLET ORAL DAILY
Qty: 90 TABLET | Refills: 1 | Status: SHIPPED | OUTPATIENT
Start: 2023-01-03

## 2023-01-03 RX ORDER — LOSARTAN POTASSIUM AND HYDROCHLOROTHIAZIDE 12.5; 5 MG/1; MG/1
1 TABLET ORAL DAILY
Qty: 90 TABLET | Refills: 1 | Status: SHIPPED | OUTPATIENT
Start: 2023-01-03

## 2023-01-03 NOTE — ASSESSMENT & PLAN NOTE
Malnutrition Findings: Appetite is better  Push good nutrition                                BMI Findings: Body mass index is 20 38 kg/m²

## 2023-01-03 NOTE — PATIENT INSTRUCTIONS
Medicare Preventive Visit Patient Instructions  Thank you for completing your Welcome to Medicare Visit or Medicare Annual Wellness Visit today  Your next wellness visit will be due in one year (1/4/2024)  The screening/preventive services that you may require over the next 5-10 years are detailed below  Some tests may not apply to you based off risk factors and/or age  Screening tests ordered at today's visit but not completed yet may show as past due  Also, please note that scanned in results may not display below  Preventive Screenings:  Service Recommendations Previous Testing/Comments   Colorectal Cancer Screening  Colonoscopy    Fecal Occult Blood Test (FOBT)/Fecal Immunochemical Test (FIT)  Fecal DNA/Cologuard Test  Flexible Sigmoidoscopy Age: 39-70 years old   Colonoscopy: every 10 years (May be performed more frequently if at higher risk)  OR  FOBT/FIT: every 1 year  OR  Cologuard: every 3 years  OR  Sigmoidoscopy: every 5 years  Screening may be recommended earlier than age 39 if at higher risk for colorectal cancer  Also, an individualized decision between you and your healthcare provider will decide whether screening between the ages of 74-80 would be appropriate   Colonoscopy: 06/12/2013  FOBT/FIT: Not on file  Cologuard: Not on file  Sigmoidoscopy: Not on file    Screening Not Indicated  History Colorectal Cancer     Prostate Cancer Screening Individualized decision between patient and health care provider in men between ages of 53-78   Medicare will cover every 12 months beginning on the day after your 50th birthday PSA: No results in last 5 years     Screening Not Indicated     Hepatitis C Screening Once for adults born between 1945 and 1965  More frequently in patients at high risk for Hepatitis C Hep C Antibody: Not on file    Screening Not Indicated   Diabetes Screening 1-2 times per year if you're at risk for diabetes or have pre-diabetes Fasting glucose: 99 mg/dL (1/29/2020)  A1C: No results in last 5 years (No results in last 5 years)  Screening Current   Cholesterol Screening Once every 5 years if you don't have a lipid disorder  May order more often based on risk factors  Lipid panel: Not on file  Screening Current      Other Preventive Screenings Covered by Medicare:  Abdominal Aortic Aneurysm (AAA) Screening: covered once if your at risk  You're considered to be at risk if you have a family history of AAA or a male between the age of 73-68 who smoking at least 100 cigarettes in your lifetime  Lung Cancer Screening: covers low dose CT scan once per year if you meet all of the following conditions: (1) Age 50-69; (2) No signs or symptoms of lung cancer; (3) Current smoker or have quit smoking within the last 15 years; (4) You have a tobacco smoking history of at least 20 pack years (packs per day x number of years you smoked); (5) You get a written order from a healthcare provider  Glaucoma Screening: covered annually if you're considered high risk: (1) You have diabetes OR (2) Family history of glaucoma OR (3)  aged 48 and older OR (3)  American aged 72 and older  Osteoporosis Screening: covered every 2 years if you meet one of the following conditions: (1) Have a vertebral abnormality; (2) On glucocorticoid therapy for more than 3 months; (3) Have primary hyperparathyroidism; (4) On osteoporosis medications and need to assess response to drug therapy  HIV Screening: covered annually if you're between the age of 12-76  Also covered annually if you are younger than 13 and older than 72 with risk factors for HIV infection  For pregnant patients, it is covered up to 3 times per pregnancy      Immunizations:  Immunization Recommendations   Influenza Vaccine Annual influenza vaccination during flu season is recommended for all persons aged >= 6 months who do not have contraindications   Pneumococcal Vaccine   * Pneumococcal conjugate vaccine = PCV13 (Prevnar 13), PCV15 (Vaxneuvance), PCV20 (Prevnar 20)  * Pneumococcal polysaccharide vaccine = PPSV23 (Pneumovax) Adults 2364 years old: 1-3 doses may be recommended based on certain risk factors  Adults 72 years old: 1-2 doses may be recommended based off what pneumonia vaccine you previously received   Hepatitis B Vaccine 3 dose series if at intermediate or high risk (ex: diabetes, end stage renal disease, liver disease)   Tetanus (Td) Vaccine - COST NOT COVERED BY MEDICARE PART B Following completion of primary series, a booster dose should be given every 10 years to maintain immunity against tetanus  Td may also be given as tetanus wound prophylaxis  Tdap Vaccine - COST NOT COVERED BY MEDICARE PART B Recommended at least once for all adults  For pregnant patients, recommended with each pregnancy  Shingles Vaccine (Shingrix) - COST NOT COVERED BY MEDICARE PART B  2 shot series recommended in those aged 48 and above     Health Maintenance Due:  There are no preventive care reminders to display for this patient  Immunizations Due:      Topic Date Due    Hepatitis B Vaccine (1 of 3 - 3-dose series) Never done    COVID-19 Vaccine (1) Never done    Pneumococcal Vaccine: 65+ Years (2 - PCV) 08/20/2015     Advance Directives   What are advance directives? Advance directives are legal documents that state your wishes and plans for medical care  These plans are made ahead of time in case you lose your ability to make decisions for yourself  Advance directives can apply to any medical decision, such as the treatments you want, and if you want to donate organs  What are the types of advance directives? There are many types of advance directives, and each state has rules about how to use them  You may choose a combination of any of the following:  Living will: This is a written record of the treatment you want  You can also choose which treatments you do not want, which to limit, and which to stop at a certain time   This includes surgery, medicine, IV fluid, and tube feedings  Durable power of  for healthcare [facility]): This is a written record that states who you want to make healthcare choices for you when you are unable to make them for yourself  This person, called a proxy, is usually a family member or a friend  You may choose more than 1 proxy  Do not resuscitate (DNR) order:  A DNR order is used in case your heart stops beating or you stop breathing  It is a request not to have certain forms of treatment, such as CPR  A DNR order may be included in other types of advance directives  Medical directive: This covers the care that you want if you are in a coma, near death, or unable to make decisions for yourself  You can list the treatments you want for each condition  Treatment may include pain medicine, surgery, blood transfusions, dialysis, IV or tube feedings, and a ventilator (breathing machine)  Values history: This document has questions about your views, beliefs, and how you feel and think about life  This information can help others choose the care that you would choose  Why are advance directives important? An advance directive helps you control your care  Although spoken wishes may be used, it is better to have your wishes written down  Spoken wishes can be misunderstood, or not followed  Treatments may be given even if you do not want them  An advance directive may make it easier for your family to make difficult choices about your care  © Copyright [employer] 2018 Information is for End User's use only and may not be sold, redistributed or otherwise used for commercial purposes  All illustrations and images included in CareNotes® are the copyrighted property of A D A M , Inc  or [address] [place] [address]  Overall seems to be functioning well  Is going to need new supplier for the colostomy supplies  Gave refill on medications  Try to push the walking activity    Did recently have COVID and did well with this

## 2023-01-03 NOTE — PROGRESS NOTES
Assessment and Plan:     Problem List Items Addressed This Visit        Digestive    Rectal carcinoma ([facility] ) (Chronic)     We will repeat CEA after next visit            Respiratory    Non-seasonal allergic rhinitis (Chronic)     Doing well, continue current regimen            Cardiovascular and Mediastinum    Essential hypertension - Primary (Chronic)     Overall doing well, continue current regimen         Relevant Medications    losartan-hydrochlorothiazide (HYZAAR) 50-12 5 mg per tablet    Paroxysmal atrial fibrillation (HCC) (Chronic)     Again seems in sinus rhythm  Continue current regimen         Relevant Medications    digoxin (LANOXIN) 0 125 mg tablet       Genitourinary    Chronic renal failure, stage 3a ([facility] [address] )       Other    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)     Is going to need new source for supplies  We will approve when the request is sent         Mild protein-calorie malnutrition ([facility] )     Malnutrition Findings: Appetite is better  Push good nutrition                                BMI Findings: Body mass index is 20 38 kg/m²  Other Visit Diagnoses     Medicare annual wellness visit, subsequent              Depression Screening and Follow-up Plan: Patient was screened for depression during today's encounter  They screened negative with a PHQ-2 score of 0  Name: Bk Hubbard  : 1930      MRN: [id number]  Encounter Provider: Cherise Dillon MD  Encounter Date: 1/3/2023   Encounter department: [address] [place] [address] PRIMARY CARE    Assessment & Plan     1  Essential hypertension  Comments:  Overall doing well continue current regimen  Assessment & Plan:  Overall doing well, continue current regimen    Orders:  -     losartan-hydrochlorothiazide (HYZAAR) 50-12 5 mg per tablet; Take 1 tablet by mouth daily    2  Medicare annual wellness visit, subsequent    3  Paroxysmal atrial fibrillation (HCC)  Comments:   In sinus rhythm today overall doing well continue baseline meds  Assessment & Plan:  Again seems in sinus rhythm  Continue current regimen    Orders:  -     digoxin (LANOXIN) 0 125 mg tablet; Take 1 tablet (125 mcg total) by mouth daily    4  Colostomy present [facility])  Assessment & Plan:  Is going to need new source for supplies  We will approve when the request is sent      5  Mild protein-calorie malnutrition ([facility] )  Assessment & Plan:  Malnutrition Findings: Appetite is better  Push good nutrition                                BMI Findings: Body mass index is 20 38 kg/m²  6  Rectal carcinoma ([facility] )  Assessment & Plan: We will repeat CEA after next visit      7  Chronic renal failure, stage 3a ([facility] )    8  Non-seasonal allergic rhinitis, unspecified trigger  Assessment & Plan:  Doing well, continue current regimen           Subjective      Patient has history of hypertension, rectal carcinoma, functional colostomy, allergic rhinitis, paroxysmal atrial fibrillation and urinary incontinence Overall has been feeling well  Has question is continues to need dressing over the area behind the left knee that was irritated    Review of Systems   Constitutional: Negative for activity change, appetite change, chills, fatigue, fever and unexpected weight change  HENT: Negative for congestion, rhinorrhea and trouble swallowing  Eyes: Positive for itching  Negative for visual disturbance  Respiratory: Negative for apnea, cough, chest tightness and shortness of breath  Cardiovascular: Negative for chest pain, palpitations and leg swelling  Gastrointestinal: Negative for abdominal distention, abdominal pain, constipation and diarrhea  Endocrine: Negative for polyuria (Nocturia times 0 to 1)  Genitourinary: Negative for difficulty urinating and enuresis  Musculoskeletal: Negative for arthralgias and myalgias  Skin: Negative for rash  Allergic/Immunologic: Negative for environmental allergies     Neurological: Negative for dizziness, weakness, light-headedness, numbness and headaches  Hematological: Negative for adenopathy  Psychiatric/Behavioral: Negative for agitation and sleep disturbance  Current Outpatient Medications on File Prior to Visit   Medication Sig   • albuterol (PROVENTIL HFA,VENTOLIN HFA) 90 mcg/act inhaler Inhale 2 puffs every 6 (six) hours as needed for wheezing   • aspirin (ECOTRIN LOW STRENGTH) 81 mg EC tablet Take 81 mg by mouth daily   • azelastine (ASTELIN) 0 1 % nasal spray 1 spray into each nostril 2 (two) times a day Use in each nostril as directed   • ketorolac (ACULAR) 0 5 % ophthalmic solution    • metoprolol tartrate (LOPRESSOR) 25 mg tablet Take 1 tablet (25 mg total) by mouth every 12 (twelve) hours   • montelukast (SINGULAIR) 10 mg tablet Take 1 tablet (10 mg total) by mouth daily at bedtime   • Multiple Vitamin (MULTIVITAMIN) capsule Take 1 capsule by mouth daily   • triamcinolone (KENALOG) 0 1 % cream Apply topically 2 (two) times a day   • [DISCONTINUED] digoxin (LANOXIN) 0 125 mg tablet Take 1 tablet (125 mcg total) by mouth daily   • [DISCONTINUED] losartan-hydrochlorothiazide (HYZAAR) 50-12 5 mg per tablet Take 1 tablet by mouth daily       Objective     /70 (BP Location: Left arm, Patient Position: Sitting, Cuff Size: Standard)   Pulse 63   Ht 5' 9" (1 753 m)   Wt 62 6 kg (138 lb)   SpO2 98%   BMI 20 38 kg/m²     Physical Exam  Vitals and nursing note reviewed  Constitutional:       Appearance: Normal appearance  He is well-developed  HENT:      Head: Normocephalic  Eyes:      Conjunctiva/sclera: Conjunctivae normal    Neck:      Thyroid: No thyromegaly  Vascular: No carotid bruit  Cardiovascular:      Rate and Rhythm: Normal rate and regular rhythm  Heart sounds: Murmur (Soft systolic murmur at left sternal border  Heart rate is 72) heard  Pulmonary:      Effort: Pulmonary effort is normal       Breath sounds: Normal breath sounds     Abdominal: General: Abdomen is flat  There is distension ( does have functioning colostomy bag)  Palpations: Abdomen is soft  There is no mass  Tenderness: There is no abdominal tenderness  Musculoskeletal:         General: Normal range of motion  Cervical back: Normal range of motion and neck supple  No tenderness  Right lower leg: No edema  Left lower leg: No edema  Lymphadenopathy:      Cervical: No cervical adenopathy  Skin:     General: Skin is warm and dry  Findings: No rash (The area behind the left knee looks clear today and does not need to continue to use dressing)  Neurological:      Mental Status: He is alert and oriented to person, place, and time  Motor: No weakness  Coordination: Coordination normal       Gait: Gait normal       Deep Tendon Reflexes: Reflexes normal    Psychiatric:         Mood and Affect: Mood normal        Angel Matthews MD  Preventive health issues were discussed with patient, and age appropriate screening tests were ordered as noted in patient's After Visit Summary  Personalized health advice and appropriate referrals for health education or preventive services given if needed, as noted in patient's After Visit Summary  History of Present Illness:     Patient presents for a Medicare Wellness Visit    Patient has history of hypertension, rectal carcinoma, functional colostomy, allergic rhinitis, paroxysmal atrial fibrillation and urinary incontinence Overall has been feeling well  Is going to need new source for the colostomy bags     Patient Care Team:  Angel Matthews MD as PCP - General (Family Medicine)     Review of Systems:     Review of Systems   Constitutional: Negative for activity change, appetite change, chills, fatigue, fever and unexpected weight change  HENT: Negative for congestion, dental problem (No dentition, upper dentures), hearing loss, rhinorrhea and trouble swallowing      Eyes: Negative for visual disturbance  Respiratory: Negative for apnea, cough, chest tightness and shortness of breath  Cardiovascular: Negative for chest pain, palpitations and leg swelling  Gastrointestinal: Negative for abdominal distention, abdominal pain, constipation and diarrhea  Endocrine: Negative for polyuria (Nocturia x1-2)  Genitourinary: Negative for difficulty urinating and enuresis  Musculoskeletal: Negative for arthralgias and myalgias  Skin: Negative for rash  Allergic/Immunologic: Positive for environmental allergies  Neurological: Negative for dizziness, weakness, light-headedness, numbness and headaches  Hematological: Negative for adenopathy  Psychiatric/Behavioral: Negative for agitation and sleep disturbance  Problem List:     Patient Active Problem List   Diagnosis   • Essential hypertension   • Paroxysmal atrial fibrillation (HCC)   • Rectal carcinoma (HCC)   • Colostomy present (HCC)   • Non-seasonal allergic rhinitis   • Mixed stress and urge urinary incontinence   • Mild protein-calorie malnutrition (HCC)   • Chronic renal failure, stage 3a (HCC)      Past Medical and Surgical History:     Past Medical History:   Diagnosis Date   • Atrial fibrillation ([facility] )    • Colorectal cancer ([facility] )    • HL (hearing loss)    • Hypertension    • Pneumonia    • [facility] 7/31/2019     Past Surgical History:   Procedure Laterality Date   • COLECTOMY     • COLOSTOMY        Family History:     Family History   Problem Relation Age of Onset   • Heart disease Mother    • Heart disease Father       Social History:     Social History     Socioeconomic History   • Marital status:       Spouse name: None   • Number of children: None   • Years of education: None   • Highest education level: None   Occupational History   • None   Tobacco Use   • Smoking status: Former     Packs/day: 0 50     Years: 15 00     Pack years: 7 50     Types: Cigarettes   • Smokeless tobacco: Never   Vaping Use   • Vaping Use: Never used   Substance and Sexual Activity   • Alcohol use: Yes   • Drug use: No   • Sexual activity: None   Other Topics Concern   • None   Social History Narrative   • None     Social Determinants of Health     Financial Resource Strain: Low Risk    • Difficulty of Paying Living Expenses: Not hard at all   Food Insecurity: Not on file   Transportation Needs: No Transportation Needs   • Lack of Transportation (Medical): No   • Lack of Transportation (Non-Medical): No   Physical Activity: Not on file   Stress: Not on file   Social Connections: Not on file   Intimate Partner Violence: Not on file   Housing Stability: Not on file      Medications and Allergies:     Current Outpatient Medications   Medication Sig Dispense Refill   • albuterol (PROVENTIL HFA,VENTOLIN HFA) 90 mcg/act inhaler Inhale 2 puffs every 6 (six) hours as needed for wheezing 1 Inhaler 0   • aspirin (ECOTRIN LOW STRENGTH) 81 mg EC tablet Take 81 mg by mouth daily     • azelastine (ASTELIN) 0 1 % nasal spray 1 spray into each nostril 2 (two) times a day Use in each nostril as directed 5 mL 5   • digoxin (LANOXIN) 0 125 mg tablet Take 1 tablet (125 mcg total) by mouth daily 90 tablet 1   • ketorolac (ACULAR) 0 5 % ophthalmic solution   2   • losartan-hydrochlorothiazide (HYZAAR) 50-12 5 mg per tablet Take 1 tablet by mouth daily 90 tablet 1   • metoprolol tartrate (LOPRESSOR) 25 mg tablet Take 1 tablet (25 mg total) by mouth every 12 (twelve) hours 90 tablet 5   • montelukast (SINGULAIR) 10 mg tablet Take 1 tablet (10 mg total) by mouth daily at bedtime 90 tablet 1   • Multiple Vitamin (MULTIVITAMIN) capsule Take 1 capsule by mouth daily     • triamcinolone (KENALOG) 0 1 % cream Apply topically 2 (two) times a day 30 g 1     No current facility-administered medications for this visit       No Known Allergies   Immunizations:     Immunization History   Administered Date(s) Administered   • INFLUENZA 09/12/2007, 09/18/2008, 09/14/2009, 09/16/2010, 10/25/2011, 11/02/2012, 11/14/2013, 11/20/2014, 10/08/2015, 11/30/2016, 12/12/2017, 10/18/2018   • Influenza, high dose seasonal 0 7 mL 10/29/2019, 10/29/2020, 09/20/2021, 09/26/2022   • Pneumococcal Polysaccharide PPV23 08/20/2014      Health Maintenance: There are no preventive care reminders to display for this patient  Topic Date Due   • Hepatitis B Vaccine (1 of 3 - 3-dose series) Never done   • COVID-19 Vaccine (1) Never done   • Pneumococcal Vaccine: 65+ Years (2 - PCV) 08/20/2015      Medicare Screening Tests and Risk Assessments:     Ana Lundberg is here for his Subsequent Wellness visit  Last Medicare Wellness visit information reviewed, patient interviewed and updates made to the record today  Health Risk Assessment:   Patient rates overall health as good  Patient feels that their physical health rating is same  Patient is satisfied with their life  Eyesight was rated as same  Hearing was rated as much worse  Patient feels that their emotional and mental health rating is same  Patients states they are never, rarely angry  Patient states they are sometimes unusually tired/fatigued  Pain experienced in the last 7 days has been none  Patient states that he has experienced no weight loss or gain in last 6 months  No issues    Depression Screening:   PHQ-2 Score: 0      Fall Risk Screening: In the past year, patient has experienced: no history of falling in past year      Home Safety:  Patient has trouble with stairs inside or outside of their home  Patient has working smoke alarms and has working carbon monoxide detector  Home safety hazards include: none  No issues although I did advise grab bars in the bathroom    Nutrition:   Current diet is Regular  No issues, push good nutrition    Medications:   Patient is currently taking over-the-counter supplements  OTC medications include: Baby aspirin  Patient is able to manage medications   No issues    Activities of Daily Living (ADLs)/Instrumental Activities of Daily Living (IADLs):   Walk and transfer into and out of bed and chair?: Yes  Dress and groom yourself?: Yes    Bathe or shower yourself?: Yes    Feed yourself? Yes  Do your laundry/housekeeping?: Yes  Manage your money, pay your bills and track your expenses?: Yes  Make your own meals?: Yes    Do your own shopping?: Yes    ADL comments: Overall patient is very functional at home no issues  Note lives with his daughter    Previous Hospitalizations:   Any hospitalizations or ED visits within the last 12 months?: No      Advance Care Planning:   Living will: Yes    Advanced directive: Yes    Advanced directive counseling given: Yes    Five wishes given: No    End of Life Decisions reviewed with patient: No      Comments: No issues  Should bring this in to office to have scanned into the chart    Cognitive Screening:   Provider or family/friend/caregiver concerned regarding cognition?: No    PREVENTIVE SCREENINGS      Cardiovascular Screening:    General: Screening Current      Diabetes Screening:     General: Screening Current      Colorectal Cancer Screening:     General: Screening Not Indicated and History Colorectal Cancer      Prostate Cancer Screening:    General: Screening Not Indicated      Osteoporosis Screening:    General: Screening Not Indicated      Abdominal Aortic Aneurysm (AAA) Screening:    Risk factors include: tobacco use        General: Screening Not Indicated      Lung Cancer Screening:     General: Screening Not Indicated      Hepatitis C Screening:    General: Screening Not Indicated      Preventive Screening Comments: I did advised to consider getting hearing aids and should follow-up for routine eye care    Screening, Brief Intervention, and Referral to Treatment (SBIRT)    Screening  Typical number of drinks in a day: 0  Typical number of drinks in a week: 0  Interpretation: Low risk drinking behavior      Brief Intervention  Alcohol & drug use screenings were reviewed  No concerns regarding substance use disorder identified  Other Counseling Topics:   Regular weightbearing exercise  No results found  Physical Exam:     /70 (BP Location: Left arm, Patient Position: Sitting, Cuff Size: Standard)   Pulse 63   Ht 5' 9" (1 753 m)   Wt 62 6 kg (138 lb)   SpO2 98%   BMI 20 38 kg/m²     Physical Exam  Vitals and nursing note reviewed  Constitutional:       Appearance: Normal appearance  HENT:      Head: Normocephalic  Right Ear: Tympanic membrane, ear canal and external ear normal  Decreased hearing (25 dB hearing screen off mild difficulty with conversation) noted  Left Ear: Tympanic membrane, ear canal and external ear normal  Decreased hearing (A 5 dB hearing screen is off) noted  Nose: Nose normal       Mouth/Throat:      Mouth: Mucous membranes are moist       Dentition: Has dentures (Upper, no lower dentition)  Eyes:      Extraocular Movements: Extraocular movements intact  Conjunctiva/sclera: Conjunctivae normal       Pupils: Pupils are equal, round, and reactive to light  Neck:      Vascular: No carotid bruit  Cardiovascular:      Rate and Rhythm: Normal rate and regular rhythm  Pulses:           Dorsalis pedis pulses are 0 on the right side and 0 on the left side  Posterior tibial pulses are 1+ on the right side and 1+ on the left side  Heart sounds: Murmur (Soft systolic murmur at left sternal border  Heart rate is 72) heard  Pulmonary:      Effort: Pulmonary effort is normal       Breath sounds: Normal breath sounds  Abdominal:      General: Abdomen is flat  There is no distension  Palpations: Abdomen is soft  There is no mass  Tenderness: There is no abdominal tenderness  Musculoskeletal:         General: No swelling  Cervical back: Normal range of motion and neck supple  No tenderness  No muscular tenderness  Right lower leg: No edema  Left lower leg: No edema  Right foot: No deformity  Left foot: No deformity  Feet:      Right foot:      Protective Sensation: 8 sites tested  8 sites sensed  Skin integrity: Skin integrity normal       Left foot:      Protective Sensation: 8 sites tested  8 sites sensed  Skin integrity: Skin integrity normal    Lymphadenopathy:      Cervical: No cervical adenopathy  Skin:     General: Skin is warm and dry  Findings: No rash  Neurological:      General: No focal deficit present  Mental Status: He is alert and oriented to person, place, and time  Cranial Nerves: No cranial nerve deficit  Sensory: No sensory deficit  Motor: No weakness  Coordination: Coordination normal       Gait: Gait normal       Deep Tendon Reflexes: Reflexes normal    Psychiatric:         Mood and Affect: Mood normal          Behavior: Behavior normal          Thought Content:  Thought content normal          Judgment: Judgment normal           Jac Sparks MD

## 2023-02-28 ENCOUNTER — TELEPHONE (OUTPATIENT)
Dept: FAMILY MEDICINE CLINIC | Facility: CLINIC | Age: 88
End: 2023-02-28

## 2023-02-28 NOTE — TELEPHONE ENCOUNTER
Voicemail:    I'm calling from [facility] in [place]  I'm calling on a mutual patient of Doctor Micki's  Name is Edgar Gomes  His daughter Jim Birmingham was requesting some refills if you could fax them to us  Our phone with any questions is [phone]  And the items they are requesting are: Tomasz Skin Barrier 5 pack,   a 34/36 inch waist belt,   Tomasz ostomy bags 30 count   A3 count of stoma paste and finally   2M9 drops  She was requesting a 90 day supply  So if you have any questions, please give us a call  Evie's number,  Av's daughter, is [phone]  Alright, if you have any questions give us a call  Thank you   Bye, bye

## 2023-03-02 ENCOUNTER — TELEPHONE (OUTPATIENT)
Dept: FAMILY MEDICINE CLINIC | Facility: CLINIC | Age: 88
End: 2023-03-02

## 2023-03-02 NOTE — TELEPHONE ENCOUNTER
Patients daughter called in with specifics on quantity of items needed:    Tomasz Skin Barrier 5 pack, 3 ct (15 total)  a 34/36 inch waist belt,   Palm ostomy bags 30 count, 3 ct (90 total)  A 3 count of stoma paste and finally   2M9 drops    Fax number for pharmacy: [phone]

## 2023-04-03 ENCOUNTER — OFFICE VISIT (OUTPATIENT)
Dept: FAMILY MEDICINE CLINIC | Facility: CLINIC | Age: 88
End: 2023-04-03

## 2023-04-03 VITALS
WEIGHT: 136 LBS | OXYGEN SATURATION: 98 % | BODY MASS INDEX: 20.14 KG/M2 | HEART RATE: 63 BPM | DIASTOLIC BLOOD PRESSURE: 60 MMHG | HEIGHT: 69 IN | SYSTOLIC BLOOD PRESSURE: 124 MMHG

## 2023-04-03 DIAGNOSIS — C20 RECTAL CARCINOMA (HCC): Chronic | ICD-10-CM

## 2023-04-03 DIAGNOSIS — I10 ESSENTIAL HYPERTENSION: Primary | Chronic | ICD-10-CM

## 2023-04-03 DIAGNOSIS — J30.89 NON-SEASONAL ALLERGIC RHINITIS, UNSPECIFIED TRIGGER: Chronic | ICD-10-CM

## 2023-04-03 DIAGNOSIS — Z93.3 COLOSTOMY PRESENT (HCC): Chronic | ICD-10-CM

## 2023-04-03 DIAGNOSIS — I48.0 PAROXYSMAL ATRIAL FIBRILLATION (HCC): Chronic | ICD-10-CM

## 2023-04-03 NOTE — PROGRESS NOTES
Name: Hever Marie  : 1930      MRN: [id number]  Encounter Provider: Mariely Bar MD  Encounter Date: 4/3/2023   Encounter department: [address] [place] [address] PRIMARY CARE    Assessment & Plan     1  Essential hypertension  Assessment & Plan: We will check renal panelOverall stable, continue current regimen    Orders:  -     Renal function panel; Future    2  Rectal carcinoma [facility])  Assessment & Plan: We will check CEA and continue to follow    Orders:  -     CEA; Future    3  Paroxysmal atrial fibrillation (HCC)  Assessment & Plan:  Again seems in sinus rhythm  Continue current regimen  We will check digoxin level    Orders:  -     Digoxin level; Future    4  Colostomy present [facility])  Assessment & Plan:  Continue basic skin care      5  Non-seasonal allergic rhinitis, unspecified trigger  Assessment & Plan:  Doing well, continue nasal spray        Tobacco Cessation Counseling: Tobacco cessation counseling was not provided  The patient is sincerely urged to quit consumption of tobacco  He is not ready to quit tobacco  Medication options not discussed  Subjective      Patient has history of hypertension, rectal carcinoma, functional colostomy, allergic rhinitis, paroxysmal atrial fibrillation and urinary incontinence Overall has been feeling well  Review of Systems   Constitutional: Negative for activity change, appetite change, chills, fatigue, fever and unexpected weight change  HENT: Negative for congestion, rhinorrhea and trouble swallowing  Eyes: Negative for visual disturbance  Respiratory: Negative for apnea, cough, chest tightness and shortness of breath  Cardiovascular: Negative for chest pain, palpitations and leg swelling  Gastrointestinal: Negative for abdominal distention, abdominal pain, constipation (Does have a functioning colostomy) and diarrhea  Endocrine: Negative for polyuria (Nocturia x1)     Genitourinary: Negative for difficulty "urinating and enuresis  Musculoskeletal: Negative for arthralgias and myalgias  Skin: Negative for rash  Allergic/Immunologic: Negative for environmental allergies  Neurological: Negative for dizziness, weakness, light-headedness, numbness and headaches  Hematological: Negative for adenopathy  Psychiatric/Behavioral: Negative for agitation and sleep disturbance  Current Outpatient Medications on File Prior to Visit   Medication Sig   • aspirin (ECOTRIN LOW STRENGTH) 81 mg EC tablet Take 81 mg by mouth daily   • azelastine (ASTELIN) 0 1 % nasal spray 1 spray into each nostril 2 (two) times a day Use in each nostril as directed   • digoxin (LANOXIN) 0 125 mg tablet Take 1 tablet (125 mcg total) by mouth daily   • losartan-hydrochlorothiazide (HYZAAR) 50-12 5 mg per tablet Take 1 tablet by mouth daily   • metoprolol tartrate (LOPRESSOR) 25 mg tablet Take 1 tablet (25 mg total) by mouth every 12 (twelve) hours   • montelukast (SINGULAIR) 10 mg tablet Take 1 tablet (10 mg total) by mouth daily at bedtime   • Multiple Vitamin (MULTIVITAMIN) capsule Take 1 capsule by mouth daily   • albuterol (PROVENTIL HFA,VENTOLIN HFA) 90 mcg/act inhaler Inhale 2 puffs every 6 (six) hours as needed for wheezing (Patient not taking: Reported on 4/3/2023)   • ketorolac (ACULAR) 0 5 % ophthalmic solution  (Patient not taking: Reported on 4/3/2023)   • triamcinolone (KENALOG) 0 1 % cream Apply topically 2 (two) times a day (Patient not taking: Reported on 4/3/2023)       Objective     /60 (BP Location: Left arm, Patient Position: Sitting, Cuff Size: Large)   Pulse 63   Ht 5' 9\" (1 753 m)   Wt 61 7 kg (136 lb)   SpO2 98%   BMI 20 08 kg/m²     Physical Exam  Vitals and nursing note reviewed  Constitutional:       Appearance: Normal appearance  He is well-developed  HENT:      Head: Normocephalic  Nose: Nose normal    Eyes:      Conjunctiva/sclera: Conjunctivae normal    Neck:      Thyroid: No thyromegaly   " Vascular: No carotid bruit  Cardiovascular:      Rate and Rhythm: Normal rate and regular rhythm  Heart sounds: Murmur (Soft systolic murmur at left sternal border  Heart rate is 78) heard  Pulmonary:      Effort: Pulmonary effort is normal       Breath sounds: Normal breath sounds  Abdominal:      General: There is no distension (Does have a functioning colostomy)  Palpations: There is no mass  Tenderness: There is no abdominal tenderness  Musculoskeletal:         General: Normal range of motion  Cervical back: Normal range of motion and neck supple  No tenderness  Right lower leg: No edema  Left lower leg: No edema  Lymphadenopathy:      Cervical: No cervical adenopathy  Skin:     General: Skin is warm and dry  Findings: No rash  Neurological:      Mental Status: He is alert and oriented to person, place, and time  Motor: No weakness        Coordination: Coordination normal       Gait: Gait normal       Deep Tendon Reflexes: Reflexes normal    Psychiatric:         Mood and Affect: Mood normal        Praneeth Youssef MD

## 2023-07-03 ENCOUNTER — OFFICE VISIT (OUTPATIENT)
Dept: FAMILY MEDICINE CLINIC | Facility: CLINIC | Age: 88
End: 2023-07-03
Payer: MEDICARE

## 2023-07-03 VITALS
DIASTOLIC BLOOD PRESSURE: 64 MMHG | OXYGEN SATURATION: 98 % | SYSTOLIC BLOOD PRESSURE: 130 MMHG | BODY MASS INDEX: 20.38 KG/M2 | WEIGHT: 137.6 LBS | HEART RATE: 78 BPM | HEIGHT: 69 IN

## 2023-07-03 DIAGNOSIS — I48.0 PAROXYSMAL ATRIAL FIBRILLATION (HCC): ICD-10-CM

## 2023-07-03 DIAGNOSIS — E44.1 MILD PROTEIN-CALORIE MALNUTRITION (HCC): ICD-10-CM

## 2023-07-03 DIAGNOSIS — J30.89 NON-SEASONAL ALLERGIC RHINITIS DUE TO OTHER ALLERGIC TRIGGER: ICD-10-CM

## 2023-07-03 DIAGNOSIS — I10 ESSENTIAL HYPERTENSION: Primary | ICD-10-CM

## 2023-07-03 PROCEDURE — 99214 OFFICE O/P EST MOD 30 MIN: CPT | Performed by: FAMILY MEDICINE

## 2023-07-03 RX ORDER — DIGOXIN 125 MCG
125 TABLET ORAL DAILY
Qty: 90 TABLET | Refills: 1 | Status: SHIPPED | OUTPATIENT
Start: 2023-07-03

## 2023-07-03 RX ORDER — LOSARTAN POTASSIUM AND HYDROCHLOROTHIAZIDE 12.5; 5 MG/1; MG/1
1 TABLET ORAL DAILY
Qty: 90 TABLET | Refills: 1 | Status: SHIPPED | OUTPATIENT
Start: 2023-07-03

## 2023-07-03 RX ORDER — AZELASTINE 1 MG/ML
1 SPRAY, METERED NASAL 2 TIMES DAILY
Qty: 5 ML | Refills: 5 | Status: SHIPPED | OUTPATIENT
Start: 2023-07-03 | End: 2023-07-03 | Stop reason: SDUPTHER

## 2023-07-03 RX ORDER — OSTOMY SUPPLY 1 3/4"
EACH MISCELLANEOUS
COMMUNITY
Start: 2023-06-30

## 2023-07-03 RX ORDER — AZELASTINE 1 MG/ML
1 SPRAY, METERED NASAL 2 TIMES DAILY
Qty: 30 ML | Refills: 1 | Status: SHIPPED | OUTPATIENT
Start: 2023-07-03

## 2023-07-03 NOTE — ASSESSMENT & PLAN NOTE
Can malnutrition Findings: Continue to push basic nutrition                             BMI Findings: Body mass index is 20.32 kg/m².

## 2023-07-03 NOTE — PATIENT INSTRUCTIONS
Overall seems to be doing well. Continue to push walking activity.   We will continue on current meds

## 2023-07-03 NOTE — PROGRESS NOTES
Name: Jason Ivory. : 1930      MRN: [id number]  Encounter Provider: Lilian Palacios MD  Encounter Date: 7/3/2023   Encounter department: [address] [place] [address] PRIMARY CARE    Assessment & Plan     1. Essential hypertension  Comments:  Overall doing well continue current regimen  Assessment & Plan:  Overall stable, continue current regimen    Orders:  -     losartan-hydrochlorothiazide (HYZAAR) 50-12.5 mg per tablet; Take 1 tablet by mouth daily    2. Paroxysmal atrial fibrillation (HCC)  Comments: In sinus rhythm today overall doing well continue baseline meds  Assessment & Plan:  Again appears in sinus rhythm. Continue current regimen    Orders:  -     digoxin (LANOXIN) 0.125 mg tablet; Take 1 tablet (125 mcg total) by mouth daily    3. Non-seasonal allergic rhinitis due to other allergic trigger  Comments:  Gave refill for the Astelin to use twice a day  Assessment & Plan:  Seems to be doing well. Continue current regimen      4. Mild protein-calorie malnutrition ([address])  Assessment & Plan:  Can malnutrition Findings: Continue to push basic nutrition                             BMI Findings: Body mass index is 20.32 kg/m². Subjective      Patient has history of hypertension, rectal carcinoma, functional colostomy, allergic rhinitis, paroxysmal atrial fibrillation and urinary incontinence Overall has been feeling well. Review of Systems   Constitutional: Negative for activity change, appetite change, chills, fatigue, fever and unexpected weight change. HENT: Negative for congestion, rhinorrhea and trouble swallowing. Eyes: Negative for visual disturbance. Respiratory: Negative for apnea, cough, chest tightness and shortness of breath. Cardiovascular: Negative for chest pain, palpitations and leg swelling. Gastrointestinal: Negative for abdominal distention, abdominal pain, constipation and diarrhea.    Endocrine: Negative for polyuria (Nocturia x1). Genitourinary: Negative for difficulty urinating and enuresis. Musculoskeletal: Negative for arthralgias and myalgias. Skin: Negative for rash. Allergic/Immunologic: Negative for environmental allergies. Neurological: Negative for dizziness, weakness, light-headedness, numbness and headaches. Hematological: Negative for adenopathy. Does not bruise/bleed easily. Psychiatric/Behavioral: Negative for agitation, confusion and sleep disturbance. Current Outpatient Medications on File Prior to Visit   Medication Sig   • albuterol (PROVENTIL HFA,VENTOLIN HFA) 90 mcg/act inhaler Inhale 2 puffs every 6 (six) hours as needed for wheezing   • aspirin (ECOTRIN LOW STRENGTH) 81 mg EC tablet Take 81 mg by mouth daily   • ketorolac (ACULAR) 0.5 % ophthalmic solution    • metoprolol tartrate (LOPRESSOR) 25 mg tablet Take 1 tablet (25 mg total) by mouth every 12 (twelve) hours   • montelukast (SINGULAIR) 10 mg tablet Take 1 tablet (10 mg total) by mouth daily at bedtime   • Multiple Vitamin (MULTIVITAMIN) capsule Take 1 capsule by mouth daily   • Ostomy Supplies (New Image Closed 2-3/4") Pouch MISC    • Ostomy Supplies (New Image Flat Barrier 70MM) WAFR    • triamcinolone (KENALOG) 0.1 % cream Apply topically 2 (two) times a day   • [DISCONTINUED] azelastine (ASTELIN) 0.1 % nasal spray 1 spray into each nostril 2 (two) times a day Use in each nostril as directed   • [DISCONTINUED] digoxin (LANOXIN) 0.125 mg tablet Take 1 tablet (125 mcg total) by mouth daily   • [DISCONTINUED] losartan-hydrochlorothiazide (HYZAAR) 50-12.5 mg per tablet Take 1 tablet by mouth daily       Objective     /64 (BP Location: Left arm, Patient Position: Sitting, Cuff Size: Large)   Pulse 78   Ht 5' 9" (1.753 m)   Wt 62.4 kg (137 lb 9.6 oz)   SpO2 98%   BMI 20.32 kg/m²     Physical Exam  Vitals and nursing note reviewed. Constitutional:       Appearance: He is well-developed. HENT:      Head: Normocephalic. Nose: Nose normal.   Eyes:      Conjunctiva/sclera: Conjunctivae normal.   Neck:      Thyroid: No thyromegaly. Vascular: No carotid bruit. Cardiovascular:      Rate and Rhythm: Normal rate and regular rhythm. Heart sounds: Murmur (Soft systolic murmur at left sternal border. Heart rate is 72) heard. Pulmonary:      Effort: Pulmonary effort is normal.      Breath sounds: Normal breath sounds. Abdominal:      General: Abdomen is flat. There is no distension. Palpations: Abdomen is soft. There is no mass. Tenderness: There is no abdominal tenderness. Musculoskeletal:         General: Normal range of motion. Cervical back: Normal range of motion and neck supple. No tenderness. Right lower leg: No edema. Left lower leg: No edema. Lymphadenopathy:      Cervical: No cervical adenopathy. Skin:     General: Skin is warm and dry. Findings: No rash. Neurological:      Mental Status: He is alert and oriented to person, place, and time. Motor: No weakness.       Coordination: Coordination normal.      Gait: Gait normal.      Deep Tendon Reflexes: Reflexes normal.   Psychiatric:         Mood and Affect: Mood normal.       Fahad Espinosa MD

## 2023-07-11 DIAGNOSIS — I48.0 PAROXYSMAL ATRIAL FIBRILLATION (HCC): ICD-10-CM

## 2023-09-11 NOTE — ASSESSMENT & PLAN NOTE
I feel this probably relates to mild asthmatic component from the clear sinus drainage  Will check chest x-ray and D-dimer to screen for congestive heart failure    Will add Singulair 10 mg to be taken daily and will reassess here in 2 weeks 185.42

## 2023-10-03 ENCOUNTER — OFFICE VISIT (OUTPATIENT)
Dept: FAMILY MEDICINE CLINIC | Facility: CLINIC | Age: 88
End: 2023-10-03
Payer: MEDICARE

## 2023-10-03 VITALS
DIASTOLIC BLOOD PRESSURE: 72 MMHG | SYSTOLIC BLOOD PRESSURE: 134 MMHG | WEIGHT: 133.8 LBS | BODY MASS INDEX: 19.82 KG/M2 | HEART RATE: 64 BPM | OXYGEN SATURATION: 100 % | HEIGHT: 69 IN

## 2023-10-03 DIAGNOSIS — Z23 ENCOUNTER FOR IMMUNIZATION: ICD-10-CM

## 2023-10-03 DIAGNOSIS — Z93.3 COLOSTOMY PRESENT (HCC): Chronic | ICD-10-CM

## 2023-10-03 DIAGNOSIS — I48.0 PAROXYSMAL ATRIAL FIBRILLATION (HCC): ICD-10-CM

## 2023-10-03 DIAGNOSIS — I10 ESSENTIAL HYPERTENSION: Primary | Chronic | ICD-10-CM

## 2023-10-03 DIAGNOSIS — J30.89 NON-SEASONAL ALLERGIC RHINITIS, UNSPECIFIED TRIGGER: Chronic | ICD-10-CM

## 2023-10-03 DIAGNOSIS — C20 RECTAL CARCINOMA (HCC): Chronic | ICD-10-CM

## 2023-10-03 PROCEDURE — 99214 OFFICE O/P EST MOD 30 MIN: CPT | Performed by: FAMILY MEDICINE

## 2023-10-03 PROCEDURE — G0008 ADMIN INFLUENZA VIRUS VAC: HCPCS | Performed by: FAMILY MEDICINE

## 2023-10-03 PROCEDURE — 90662 IIV NO PRSV INCREASED AG IM: CPT | Performed by: FAMILY MEDICINE

## 2023-10-03 RX ORDER — MONTELUKAST SODIUM 10 MG/1
10 TABLET ORAL
Qty: 90 TABLET | Refills: 1 | Status: SHIPPED | OUTPATIENT
Start: 2023-10-03

## 2023-10-03 NOTE — PATIENT INSTRUCTIONS
Overall seems to be doing well. I did fill out the physical information for the 's license. We will continue on all meds as is.   Flu shot given today

## 2023-10-03 NOTE — PROGRESS NOTES
Name: Tereza Osei. : 1930      MRN: [id number]  Encounter Provider: Sophia Gaona MD  Encounter Date: 10/3/2023   Encounter department: [facility] PRIMARY CARE    Assessment & Plan     1. Essential hypertension  Assessment & Plan:  Overall stable, continue current regimen      2. Encounter for immunization  -     influenza vaccine, high-dose, PF 0.7 mL (FLUZONE HIGH-DOSE)    3. Paroxysmal atrial fibrillation ([address])  Comments: In sinus rhythm today overall doing well continue baseline meds  Assessment & Plan:  Again appears in sinus rhythm, continue current regimen    Orders:  -     metoprolol tartrate (LOPRESSOR) 25 mg tablet; Take 1 tablet (25 mg total) by mouth every 12 (twelve) hours    4. Non-seasonal allergic rhinitis, unspecified trigger  Assessment & Plan:  Doing well, continue current regimen    Orders:  -     montelukast (SINGULAIR) 10 mg tablet; Take 1 tablet (10 mg total) by mouth daily at bedtime    5. Rectal carcinoma [facility])  Assessment & Plan:  Has been doing well. We will continue to follow      6. Colostomy present [facility])  Assessment & Plan:  Continue basic skin care           Subjective      Patient has history of hypertension, rectal carcinoma, functional colostomy, allergic rhinitis, paroxysmal atrial fibrillation and urinary incontinence Overall has been feeling well. Review of Systems   Constitutional: Negative for activity change, appetite change, chills, fatigue, fever and unexpected weight change. HENT: Negative for congestion, rhinorrhea and trouble swallowing. Eyes: Negative for visual disturbance. Respiratory: Negative for apnea, cough, chest tightness and shortness of breath. Cardiovascular: Negative for chest pain, palpitations and leg swelling. Gastrointestinal: Negative for abdominal distention, abdominal pain, constipation and diarrhea. Endocrine: Negative for polyuria (Nocturia x1).    Genitourinary: Negative for difficulty urinating. Musculoskeletal: Negative for arthralgias, gait problem and myalgias. Skin: Negative for rash. Allergic/Immunologic: Positive for environmental allergies. Neurological: Negative for dizziness, weakness, light-headedness, numbness and headaches. Hematological: Negative for adenopathy. Psychiatric/Behavioral: Negative for agitation and sleep disturbance. Current Outpatient Medications on File Prior to Visit   Medication Sig   • albuterol (PROVENTIL HFA,VENTOLIN HFA) 90 mcg/act inhaler Inhale 2 puffs every 6 (six) hours as needed for wheezing   • aspirin (ECOTRIN LOW STRENGTH) 81 mg EC tablet Take 81 mg by mouth daily   • azelastine (ASTELIN) 0.1 % nasal spray 1 spray into each nostril 2 (two) times a day Use in each nostril as directed   • digoxin (LANOXIN) 0.125 mg tablet Take 1 tablet (125 mcg total) by mouth daily   • ketorolac (ACULAR) 0.5 % ophthalmic solution    • losartan-hydrochlorothiazide (HYZAAR) 50-12.5 mg per tablet Take 1 tablet by mouth daily   • Multiple Vitamin (MULTIVITAMIN) capsule Take 1 capsule by mouth daily   • Ostomy Supplies (New Image Closed 2-3/4") Pouch MISC    • Ostomy Supplies (New Image Flat Barrier 70MM) WAFR    • triamcinolone (KENALOG) 0.1 % cream Apply topically 2 (two) times a day   • [DISCONTINUED] metoprolol tartrate (LOPRESSOR) 25 mg tablet TAKE 1 TABLET BY MOUTH ONCE EVERY 12 HOURS   • [DISCONTINUED] montelukast (SINGULAIR) 10 mg tablet Take 1 tablet (10 mg total) by mouth daily at bedtime       Objective     /72 (BP Location: Left arm, Patient Position: Sitting, Cuff Size: Large)   Pulse 64   Ht 5' 9" (1.753 m)   Wt 60.7 kg (133 lb 12.8 oz)   SpO2 100%   BMI 19.76 kg/m²     Physical Exam  Vitals and nursing note reviewed. Constitutional:       Appearance: Normal appearance. He is well-developed. HENT:      Head: Normocephalic.       Nose: Nose normal.   Eyes:      Conjunctiva/sclera: Conjunctivae normal.   Neck: Thyroid: No thyromegaly. Vascular: No carotid bruit. Cardiovascular:      Rate and Rhythm: Normal rate and regular rhythm. Heart sounds: Murmur (Soft systolic murmur at left sternal border. Heart rate is 66) heard. Pulmonary:      Effort: Pulmonary effort is normal.      Breath sounds: Normal breath sounds. Abdominal:      General: Abdomen is flat. There is no distension. Palpations: Abdomen is soft. There is no mass. Tenderness: There is no abdominal tenderness (Has functioning colostomy). Musculoskeletal:         General: Normal range of motion. Cervical back: Normal range of motion and neck supple. No tenderness. Right lower leg: No edema. Left lower leg: No edema. Lymphadenopathy:      Cervical: No cervical adenopathy. Skin:     General: Skin is warm and dry. Findings: No rash. Neurological:      Mental Status: He is alert and oriented to person, place, and time. Motor: No weakness.       Coordination: Coordination normal.      Gait: Gait abnormal.      Deep Tendon Reflexes: Reflexes normal.   Psychiatric:         Mood and Affect: Mood normal.       Clyda Landau, MD

## 2023-10-17 DIAGNOSIS — Z93.3 COLOSTOMY PRESENT (HCC): Primary | ICD-10-CM

## 2023-10-17 RX ORDER — OSTOMY SUPPLY 1 3/4"
EACH MISCELLANEOUS
Qty: 30 EACH | Refills: 0 | Status: SHIPPED | OUTPATIENT
Start: 2023-10-17 | End: 2023-10-19

## 2023-10-19 DIAGNOSIS — Z93.3 COLOSTOMY PRESENT (HCC): ICD-10-CM

## 2023-10-19 RX ORDER — OSTOMY SUPPLY 1 3/4"
EACH MISCELLANEOUS
Qty: 30 EACH | Refills: 0 | Status: SHIPPED | OUTPATIENT
Start: 2023-10-19

## 2023-11-28 ENCOUNTER — TELEPHONE (OUTPATIENT)
Dept: FAMILY MEDICINE CLINIC | Facility: CLINIC | Age: 88
End: 2023-11-28

## 2023-11-28 NOTE — TELEPHONE ENCOUNTER
Spoke with daughter Chichi Nathan. As he does have an appointment on Thursday she is going to switch back to him taking the metoprolol 25mg once a day and the digoxin to once a day. I did urge her to keep him on the blood thinner until his appointment. This AM his heart rate was 55, bp 133/5, at 12 noon it was 102/48 hr 53, at 2:50 205/44 hr 53.

## 2023-11-28 NOTE — TELEPHONE ENCOUNTER
Phone call from [email or web address] and his daughter Khurram Messer. They are concerned about his heart rate and blood pressure. The hospital increased his metoprolol to 50mg TWICE a day where as before you had him on 25mg twice a day. They also stopped his losartan. They would prefer not to wait until this appt for this specific concern. His heart rate has ranged between mid 40s to mid 50s since discharge.

## 2023-11-29 ENCOUNTER — HOSPITAL ENCOUNTER (INPATIENT)
Facility: HOSPITAL | Age: 88
LOS: 4 days | Discharge: HOME HEALTH | DRG: 282 | End: 2023-12-03
Attending: EMERGENCY MEDICINE | Admitting: FAMILY MEDICINE
Payer: MEDICARE

## 2023-11-29 ENCOUNTER — APPOINTMENT (EMERGENCY)
Dept: RADIOLOGY | Facility: HOSPITAL | Age: 88
DRG: 282 | End: 2023-11-29
Payer: MEDICARE

## 2023-11-29 ENCOUNTER — APPOINTMENT (INPATIENT)
Dept: CT IMAGING | Facility: HOSPITAL | Age: 88
DRG: 282 | End: 2023-11-29
Payer: MEDICARE

## 2023-11-29 DIAGNOSIS — E86.0 DEHYDRATION: ICD-10-CM

## 2023-11-29 DIAGNOSIS — R55 VASOVAGAL SYNCOPE: ICD-10-CM

## 2023-11-29 DIAGNOSIS — I48.0 PAROXYSMAL ATRIAL FIBRILLATION (HCC): ICD-10-CM

## 2023-11-29 DIAGNOSIS — R55 SYNCOPE: Primary | ICD-10-CM

## 2023-11-29 DIAGNOSIS — N17.9 AKI (ACUTE KIDNEY INJURY) (HCC): ICD-10-CM

## 2023-11-29 DIAGNOSIS — T50.905A ADVERSE EFFECT OF DRUG, INITIAL ENCOUNTER: ICD-10-CM

## 2023-11-29 PROBLEM — I25.2 H/O NON-ST ELEVATION MYOCARDIAL INFARCTION (NSTEMI): Status: ACTIVE | Noted: 2023-11-29

## 2023-11-29 LAB
2HR DELTA HS TROPONIN: 0 NG/L
4HR DELTA HS TROPONIN: 0 NG/L
ALBUMIN SERPL BCP-MCNC: 3.5 G/DL (ref 3.5–5)
ALP SERPL-CCNC: 46 U/L (ref 34–104)
ALT SERPL W P-5'-P-CCNC: 20 U/L (ref 7–52)
ANION GAP SERPL CALCULATED.3IONS-SCNC: 7 MMOL/L
AST SERPL W P-5'-P-CCNC: 19 U/L (ref 13–39)
ATRIAL RATE: 58 BPM
BASOPHILS # BLD AUTO: 0.01 THOUSANDS/ÂΜL (ref 0–0.1)
BASOPHILS NFR BLD AUTO: 0 % (ref 0–1)
BILIRUB SERPL-MCNC: 0.54 MG/DL (ref 0.2–1)
BUN SERPL-MCNC: 38 MG/DL (ref 5–25)
CALCIUM SERPL-MCNC: 8.4 MG/DL (ref 8.4–10.2)
CARDIAC TROPONIN I PNL SERPL HS: 12 NG/L
CHLORIDE SERPL-SCNC: 104 MMOL/L (ref 96–108)
CO2 SERPL-SCNC: 26 MMOL/L (ref 21–32)
CREAT SERPL-MCNC: 1.79 MG/DL (ref 0.6–1.3)
DIGOXIN SERPL-MCNC: 1.2 NG/ML (ref 0.8–2)
EOSINOPHIL # BLD AUTO: 0.06 THOUSAND/ÂΜL (ref 0–0.61)
EOSINOPHIL NFR BLD AUTO: 0 % (ref 0–6)
ERYTHROCYTE [DISTWIDTH] IN BLOOD BY AUTOMATED COUNT: 13.7 % (ref 11.6–15.1)
GFR SERPL CREATININE-BSD FRML MDRD: 31 ML/MIN/1.73SQ M
GLUCOSE SERPL-MCNC: 105 MG/DL (ref 65–140)
HCT VFR BLD AUTO: 34.2 % (ref 36.5–49.3)
HGB BLD-MCNC: 11.1 G/DL (ref 12–17)
IMM GRANULOCYTES # BLD AUTO: 0.11 THOUSAND/UL (ref 0–0.2)
IMM GRANULOCYTES NFR BLD AUTO: 1 % (ref 0–2)
LYMPHOCYTES # BLD AUTO: 1.74 THOUSANDS/ÂΜL (ref 0.6–4.47)
LYMPHOCYTES NFR BLD AUTO: 13 % (ref 14–44)
MCH RBC QN AUTO: 32.8 PG (ref 26.8–34.3)
MCHC RBC AUTO-ENTMCNC: 32.5 G/DL (ref 31.4–37.4)
MCV RBC AUTO: 101 FL (ref 82–98)
MONOCYTES # BLD AUTO: 1.2 THOUSAND/ÂΜL (ref 0.17–1.22)
MONOCYTES NFR BLD AUTO: 9 % (ref 4–12)
NEUTROPHILS # BLD AUTO: 10.74 THOUSANDS/ÂΜL (ref 1.85–7.62)
NEUTS SEG NFR BLD AUTO: 77 % (ref 43–75)
NRBC BLD AUTO-RTO: 0 /100 WBCS
P AXIS: 68 DEGREES
PLATELET # BLD AUTO: 264 THOUSANDS/UL (ref 149–390)
PMV BLD AUTO: 10.5 FL (ref 8.9–12.7)
POTASSIUM SERPL-SCNC: 3.7 MMOL/L (ref 3.5–5.3)
PR INTERVAL: 104 MS
PROT SERPL-MCNC: 6.5 G/DL (ref 6.4–8.4)
QRS AXIS: 60 DEGREES
QRSD INTERVAL: 88 MS
QT INTERVAL: 382 MS
QTC INTERVAL: 374 MS
RBC # BLD AUTO: 3.38 MILLION/UL (ref 3.88–5.62)
SODIUM SERPL-SCNC: 137 MMOL/L (ref 135–147)
T WAVE AXIS: -56 DEGREES
VENTRICULAR RATE: 58 BPM
WBC # BLD AUTO: 13.86 THOUSAND/UL (ref 4.31–10.16)

## 2023-11-29 PROCEDURE — 99223 1ST HOSP IP/OBS HIGH 75: CPT | Performed by: FAMILY MEDICINE

## 2023-11-29 PROCEDURE — 99285 EMERGENCY DEPT VISIT HI MDM: CPT

## 2023-11-29 PROCEDURE — 99232 SBSQ HOSP IP/OBS MODERATE 35: CPT

## 2023-11-29 PROCEDURE — 80053 COMPREHEN METABOLIC PANEL: CPT | Performed by: EMERGENCY MEDICINE

## 2023-11-29 PROCEDURE — 71250 CT THORAX DX C-: CPT

## 2023-11-29 PROCEDURE — 84484 ASSAY OF TROPONIN QUANT: CPT | Performed by: EMERGENCY MEDICINE

## 2023-11-29 PROCEDURE — 93005 ELECTROCARDIOGRAM TRACING: CPT

## 2023-11-29 PROCEDURE — 71045 X-RAY EXAM CHEST 1 VIEW: CPT

## 2023-11-29 PROCEDURE — 36415 COLL VENOUS BLD VENIPUNCTURE: CPT | Performed by: EMERGENCY MEDICINE

## 2023-11-29 PROCEDURE — 99285 EMERGENCY DEPT VISIT HI MDM: CPT | Performed by: EMERGENCY MEDICINE

## 2023-11-29 PROCEDURE — 80162 ASSAY OF DIGOXIN TOTAL: CPT | Performed by: EMERGENCY MEDICINE

## 2023-11-29 PROCEDURE — 85025 COMPLETE CBC W/AUTO DIFF WBC: CPT | Performed by: EMERGENCY MEDICINE

## 2023-11-29 PROCEDURE — G1004 CDSM NDSC: HCPCS

## 2023-11-29 RX ORDER — HEPARIN SODIUM 5000 [USP'U]/ML
5000 INJECTION, SOLUTION INTRAVENOUS; SUBCUTANEOUS EVERY 8 HOURS SCHEDULED
Status: DISCONTINUED | OUTPATIENT
Start: 2023-11-29 | End: 2023-11-30

## 2023-11-29 RX ORDER — CLOPIDOGREL BISULFATE 75 MG/1
75 TABLET ORAL DAILY
Status: DISCONTINUED | OUTPATIENT
Start: 2023-11-30 | End: 2023-11-30

## 2023-11-29 RX ORDER — ATORVASTATIN CALCIUM 20 MG/1
20 TABLET, FILM COATED ORAL DAILY
Status: DISCONTINUED | OUTPATIENT
Start: 2023-11-30 | End: 2023-12-03 | Stop reason: HOSPADM

## 2023-11-29 RX ORDER — AMIODARONE HYDROCHLORIDE 100 MG/1
100 TABLET ORAL DAILY
COMMUNITY

## 2023-11-29 RX ORDER — CLOPIDOGREL BISULFATE 75 MG/1
75 TABLET ORAL DAILY
COMMUNITY
End: 2023-12-03

## 2023-11-29 RX ORDER — MONTELUKAST SODIUM 10 MG/1
10 TABLET ORAL
Status: DISCONTINUED | OUTPATIENT
Start: 2023-11-29 | End: 2023-12-03 | Stop reason: HOSPADM

## 2023-11-29 RX ORDER — SODIUM CHLORIDE, SODIUM LACTATE, POTASSIUM CHLORIDE, CALCIUM CHLORIDE 600; 310; 30; 20 MG/100ML; MG/100ML; MG/100ML; MG/100ML
75 INJECTION, SOLUTION INTRAVENOUS CONTINUOUS
Status: DISPENSED | OUTPATIENT
Start: 2023-11-29 | End: 2023-11-29

## 2023-11-29 RX ORDER — ACETAMINOPHEN 325 MG/1
650 TABLET ORAL EVERY 6 HOURS PRN
Status: DISCONTINUED | OUTPATIENT
Start: 2023-11-29 | End: 2023-12-03 | Stop reason: HOSPADM

## 2023-11-29 RX ORDER — ATORVASTATIN CALCIUM 20 MG/1
20 TABLET, FILM COATED ORAL DAILY
COMMUNITY

## 2023-11-29 RX ORDER — AMIODARONE HYDROCHLORIDE 200 MG/1
100 TABLET ORAL DAILY
Status: DISCONTINUED | OUTPATIENT
Start: 2023-11-30 | End: 2023-12-03 | Stop reason: HOSPADM

## 2023-11-29 RX ORDER — AZELASTINE 1 MG/ML
1 SPRAY, METERED NASAL
Status: DISCONTINUED | OUTPATIENT
Start: 2023-11-29 | End: 2023-12-03 | Stop reason: HOSPADM

## 2023-11-29 RX ADMIN — HEPARIN SODIUM 5000 UNITS: 5000 INJECTION INTRAVENOUS; SUBCUTANEOUS at 18:07

## 2023-11-29 RX ADMIN — METOPROLOL TARTRATE 25 MG: 25 TABLET, FILM COATED ORAL at 23:00

## 2023-11-29 RX ADMIN — SODIUM CHLORIDE 500 ML: 0.9 INJECTION, SOLUTION INTRAVENOUS at 14:20

## 2023-11-29 RX ADMIN — SODIUM CHLORIDE, SODIUM LACTATE, POTASSIUM CHLORIDE, AND CALCIUM CHLORIDE 150 ML/HR: .6; .31; .03; .02 INJECTION, SOLUTION INTRAVENOUS at 15:15

## 2023-11-29 RX ADMIN — MONTELUKAST 10 MG: 10 TABLET, FILM COATED ORAL at 22:51

## 2023-11-29 NOTE — H&P
[address] [facility]  H&P  Name: Hannah Neal. 80 y.o. male I MRN: [id number]  Unit/Bed#: -01 I Date of Admission: 11/29/2023   Date of Service: 11/29/2023 I Hospital Day: 0      Assessment/Plan   * Vasovagal syncope  Assessment & Plan  Syncopal episode at home and was caught by daughter. Patient unable to recall events prior to syncopal episode. Family also reporting bradycardia and hypotension and at home  Likely secondary to overmedication with metoprolol causing bradycardia and possibly hypotension  Orthostatic vital signs  Telemetry  Gentle IVF   Decrease metoprolol to 25 mg 2 times daily. Resume amiodarone. can resume digoxin on 12/1. Cardiology consulted    Paroxysmal atrial fibrillation [facility])  Assessment & Plan  Recently had increase in metoprolol to 50mg every 12 hours which resulted in syncopal episode and hypotension  Was taking metoprolol 25 mg daily prior to hospitalization on 11/16. Continue metoprolol at 25 mg twice daily as well as digoxin every other day and daily amiodarone  Daughter unsure as to why patient is not on anticoagulation  Cardiology consulted  EKG on admission with sinus bradycardia    H/O non-ST elevation myocardial infarction (NSTEMI)  Assessment & Plan  Had recent NSTEMI 11/16 at [facility] and was treated with heparin drip  Continue aspirin, plavix, statin, metoprolol  EKG with stable/chronic ischemic changes    Chronic renal failure, stage 3a [facility])  Assessment & Plan  Lab Results   Component Value Date    EGFR 31 11/29/2023    EGFR 36 (L) 04/13/2023    EGFR 47 01/29/2020    CREATININE 1.79 (H) 11/29/2023    CREATININE 1.76 (H) 04/13/2023    CREATININE 1.48 (H) 03/28/2022   Baseline 1.4-1.6, occasionally 1.7  Will give gentle IVF due to dehydration  Avoid nephrotoxic agents and hypotension    Rectal carcinoma ([address] [facility])  Assessment & Plan  With colostomy present.   Continue outpatient follow-up    Essential hypertension  Assessment & Plan  Hyzaar recently discontinued due to poor kidney function           VTE Pharmacologic Prophylaxis:   High Risk (Score >/= 5) - Pharmacological DVT Prophylaxis Ordered: heparin. Sequential Compression Devices Ordered. Code Status: Level 3 - DNAR and DNI   Discussion with family: Updated  (daughter and son in law) at bedside. Anticipated Length of Stay: Patient will be admitted on an inpatient basis with an anticipated length of stay of greater than 2 midnights secondary to syncopal episode at home secondary to overmedication with metoprolol. Total Time Spent on Date of Encounter in care of patient: 60 mins. This time was spent on one or more of the following: performing physical exam; counseling and coordination of care; obtaining or reviewing history; documenting in the medical record; reviewing/ordering tests, medications or procedures; communicating with other healthcare professionals and discussing with patient's family/caregivers. Chief Complaint: " I passed out at home"     History of Present Illness:  Ann Farfan is a 80 y.o. male with a PMH of CKD stage III, rectal carcinoma, essential hypertension, paroxysmal A-fib, NSTEMI who presents with syncope. Daughter states patient was walking to the kitchen for lunch when he stopped suddenly and started to fall. Daughter caught patient. Patient did not have any prodromal symptoms prior to syncope. Daughter states patient was persistently hypotensive and bradycardic while at home, and daughter has been keeping a log of blood pressures and heart rates. Daughter states that metoprolol dose was increased from prior hospitalization at [facility], and patient has not been the same since discharge. Review of Systems:  Review of Systems   Constitutional:  Negative for diaphoresis, fatigue and fever. HENT:  Negative for congestion, sneezing and sore throat.     Respiratory:  Negative for cough, chest tightness, shortness of breath and wheezing. Cardiovascular:  Negative for chest pain, palpitations and leg swelling. Bradycardia   Gastrointestinal:  Negative for abdominal distention, abdominal pain, constipation, diarrhea, nausea and vomiting. Genitourinary:  Negative for difficulty urinating and dysuria. Musculoskeletal:  Negative for arthralgias and back pain. Neurological:  Positive for syncope. Negative for dizziness, weakness, light-headedness, numbness and headaches. Psychiatric/Behavioral:  Negative for agitation, behavioral problems and confusion. Past Medical and Surgical History:   Medical History        Past Medical History:   Diagnosis Date    Atrial fibrillation ([facility])      Colorectal cancer ([facility])      HL (hearing loss)      Hypertension      Pneumonia      Rectal carcinoma ([facility]) 7/31/2019            Surgical History         Past Surgical History:   Procedure Laterality Date    COLECTOMY        COLOSTOMY                Meds/Allergies:          Prior to Admission medications    Medication Sig Start Date End Date Taking?  Authorizing Provider   albuterol (PROVENTIL HFA,VENTOLIN HFA) 90 mcg/act inhaler Inhale 2 puffs every 6 (six) hours as needed for wheezing 12/3/18     Nba Blount PA-C   aspirin (ECOTRIN LOW STRENGTH) 81 mg EC tablet Take 81 mg by mouth daily       Historical Provider, MD   azelastine (ASTELIN) 0.1 % nasal spray 1 spray into each nostril 2 (two) times a day Use in each nostril as directed 7/3/23     Alesha Rockwell MD   digoxin (LANOXIN) 0.125 mg tablet Take 1 tablet (125 mcg total) by mouth daily 7/3/23     Alesha Rockwell MD   ketorolac Yadira Boards) 0.5 % ophthalmic solution   10/10/19     Historical Provider, MD   losartan-hydrochlorothiazide Milta Nest) 50-12.5 mg per tablet Take 1 tablet by mouth daily 7/3/23     Alesha Rockwell MD   metoprolol tartrate (LOPRESSOR) 25 mg tablet Take 1 tablet (25 mg total) by mouth every 12 (twelve) hours 10/3/23 Lucian Corrales MD   montelukast (SINGULAIR) 10 mg tablet Take 1 tablet (10 mg total) by mouth daily at bedtime 10/3/23     Lucian Corrales MD   Multiple Vitamin (MULTIVITAMIN) capsule Take 1 capsule by mouth daily       Historical Provider, MD   Ostomy Supplies (New Image Closed 2-3/4") Pouch MISC USE AS DIRECTED BY YOUR PHYSICIAN. 10/19/23     Lucian Corrales MD   Ostomy Supplies (New Image Flat Barrier 70MM) [facility]   6/30/23     Historical Provider, MD   triamcinolone (KENALOG) 0.1 % cream Apply topically 2 (two) times a day 9/21/22     Hever Henning MD      I have reviewed home medications with patient personally. Allergies: No Known Allergies     Social History:  Marital Status:    Patient Pre-hospital Living Situation: Home  Patient Pre-hospital Level of Mobility: walks  Patient Pre-hospital Diet Restrictions: None  Substance Use History:   Social History          Substance and Sexual Activity   Alcohol Use Yes      Social History           Tobacco Use   Smoking Status Every Day    Packs/day: 0.50    Years: 40.00    Total pack years: 20.00    Types: Cigarettes   Smokeless Tobacco Never      Social History          Substance and Sexual Activity   Drug Use No         Family History:  Family History         Family History   Problem Relation Age of Onset    Heart disease Mother      Heart disease Father              Physical Exam:      Vitals:   Blood Pressure: (!) 173/67 (11/29/23 1702)  Pulse: (!) 54 (11/29/23 1702)  Temperature: 97.9 °F (36.6 °C) (11/29/23 1702)  Temp Source: Temporal (11/29/23 1341)  Respirations: 17 (11/29/23 1702)  Weight - Scale: 58.1 kg (128 lb 1.4 oz) (11/29/23 1341)  SpO2: 98 % (11/29/23 1702)     Physical Exam  Vitals reviewed. Constitutional:       General: He is not in acute distress. Appearance: Normal appearance. He is not ill-appearing. HENT:      Head: Normocephalic and atraumatic.       Nose: Nose normal.      Mouth/Throat:      Mouth: Mucous membranes are moist.      Pharynx: Oropharynx is clear. Eyes:      Extraocular Movements: Extraocular movements intact. Conjunctiva/sclera: Conjunctivae normal.   Cardiovascular:      Rate and Rhythm: Regular rhythm. Bradycardia present. Pulses: Normal pulses. Heart sounds: Normal heart sounds. No murmur heard. Pulmonary:      Effort: Pulmonary effort is normal. No respiratory distress. Breath sounds: Normal breath sounds. No wheezing or rhonchi. Abdominal:      General: Abdomen is flat. Bowel sounds are normal. There is no distension. Palpations: Abdomen is soft. Tenderness: There is no abdominal tenderness. There is no guarding. Musculoskeletal:         General: Normal range of motion. Cervical back: Normal range of motion. Right lower leg: No edema. Left lower leg: No edema. Skin:     General: Skin is warm. Neurological:      General: No focal deficit present. Mental Status: He is alert. Mental status is at baseline. Motor: No weakness. Comments: Oriented to self, family and that he is in the hospital   Psychiatric:         Mood and Affect: Mood normal.         Behavior: Behavior normal.         Thought Content:  Thought content normal.         Judgment: Judgment normal.            Additional Data:      Lab Results:       Results from last 7 days   Lab Units 11/29/23  1417   WBC Thousand/uL 13.86*   HEMOGLOBIN g/dL 11.1*   HEMATOCRIT % 34.2*   PLATELETS Thousands/uL 264   NEUTROS PCT % 77*   LYMPHS PCT % 13*   MONOS PCT % 9   EOS PCT % 0           Results from last 7 days   Lab Units 11/29/23  1417   SODIUM mmol/L 137   POTASSIUM mmol/L 3.7   CHLORIDE mmol/L 104   CO2 mmol/L 26   BUN mg/dL 38*   CREATININE mg/dL 1.79*   ANION GAP mmol/L 7   CALCIUM mg/dL 8.4   ALBUMIN g/dL 3.5   TOTAL BILIRUBIN mg/dL 0.54   ALK PHOS U/L 46   ALT U/L 20   AST U/L 19   GLUCOSE RANDOM mg/dL 105                         Lines/Drains:  Invasive Devices Peripheral Intravenous Line  Duration                Peripheral IV 11/29/23 Right;Upper;Ventral (anterior) Arm <1 day                                Imaging: Reviewed radiology reports from this admission including: chest xray  XR chest 1 view portable   Final Result by Feliciano Coates MD (11/29 1528)       Evidence of chronic scarring and pleural plaques. No obvious new infiltrate. Ellen Edgar Possible tiny apical pneumothorax on the left. Follow-up with inspiratory expiratory views or CT could be utilized. This was discussed with Dr. Donovan Kauffman at 3:25 p.m. Workstation performed: [id number]           CT chest without contrast    (Results Pending)         EKG and Other Studies Reviewed on Admission:   EKG: Sinus Bradycardia. HR 58.     ** Please Note: This note has been constructed using a voice recognition system.  *

## 2023-11-29 NOTE — ED NOTES
Was at [address] [place] [address] and admitted. He was there x 6 days dc home with family. Has been home since 11/21/23. New confusion in the hospital and then at home. They want him re evaluated for all the medications changes.  Her concern is that his HR has been in the 40s and BP as low at 175 Liat Tapia, RN  57/86/82 0628

## 2023-11-29 NOTE — ASSESSMENT & PLAN NOTE
Lab Results   Component Value Date    EGFR 31 11/29/2023    EGFR 36 (L) 04/13/2023    EGFR 47 01/29/2020    CREATININE 1.79 (H) 11/29/2023    CREATININE 1.76 (H) 04/13/2023    CREATININE 1.48 (H) 03/28/2022   Baseline 1.4-1.6, occasionally 1.7  Will give gentle IVF due to dehydration  Avoid nephrotoxic agents and hypotension

## 2023-11-29 NOTE — ASSESSMENT & PLAN NOTE
Syncopal episode at home and was caught by daughter. Patient unable to recall events prior to syncopal episode. Family also reporting bradycardia and hypotension and at home  Likely secondary to overmedication with metoprolol causing bradycardia and possibly hypotension  Orthostatic vital signs  Telemetry  Gentle IVF   Decrease metoprolol to 25 mg 2 times daily. Resume amiodarone. can resume digoxin on 12/1.   Cardiology consulted

## 2023-11-29 NOTE — ASSESSMENT & PLAN NOTE
Recently had increase in metoprolol to 50mg every 12 hours which resulted in syncopal episode and hypotension  Was taking metoprolol 25 mg daily prior to hospitalization on 11/16.   Continue metoprolol at 25 mg twice daily as well as digoxin every other day and daily amiodarone  Daughter unsure as to why patient is not on anticoagulation  Cardiology consulted  EKG on admission with sinus bradycardia

## 2023-11-29 NOTE — PROGRESS NOTES
[address] [facility]  Progress Note  Name: Jase Romero I  MRN: [id number]  Unit/Bed#: -01 I Date of Admission: 11/29/2023   Date of Service: 11/29/2023 I Hospital Day: 0    Assessment/Plan   * Vasovagal syncope  Assessment & Plan  Syncopal episode at home and was caught by daughter. Patient unable to recall events prior to syncopal episode. Family also reporting bradycardia and hypotension and at home  Likely secondary to overmedication with metoprolol causing bradycardia and possibly hypotension  Orthostatic vital signs  Telemetry  Gentle IVF   Decrease metoprolol to 25 mg 2 times daily. Resume amiodarone. can resume digoxin on 12/1. Cardiology consulted    Paroxysmal atrial fibrillation [facility])  Assessment & Plan  Recently had increase in metoprolol to 50mg every 12 hours which resulted in syncopal episode and hypotension  Was taking metoprolol 25 mg daily prior to hospitalization on 11/16. Continue metoprolol at 25 mg twice daily as well as digoxin every other day and daily amiodarone  Daughter unsure as to why patient is not on anticoagulation  Cardiology consulted  EKG on admission with sinus bradycardia    H/O non-ST elevation myocardial infarction (NSTEMI)  Assessment & Plan  Had recent NSTEMI 11/16 at [facility] and was treated with heparin drip  Continue aspirin, plavix, statin, metoprolol  EKG with stable/chronic ischemic changes    Chronic renal failure, stage 3a [facility])  Assessment & Plan  Lab Results   Component Value Date    EGFR 31 11/29/2023    EGFR 36 (L) 04/13/2023    EGFR 47 01/29/2020    CREATININE 1.79 (H) 11/29/2023    CREATININE 1.76 (H) 04/13/2023    CREATININE 1.48 (H) 03/28/2022   Baseline 1.4-1.6, occasionally 1.7  Will give gentle IVF due to dehydration  Avoid nephrotoxic agents and hypotension    Rectal carcinoma ([address] )  Assessment & Plan  With colostomy present.   Continue outpatient follow-up    Essential hypertension  Assessment & Plan  Hyzaar recently discontinued due to poor kidney function           VTE Pharmacologic Prophylaxis:   High Risk (Score >/= 5) - Pharmacological DVT Prophylaxis Ordered: heparin. Sequential Compression Devices Ordered. Code Status: Level 3 - DNAR and DNI   Discussion with family: Updated  (daughter and son in law) at bedside. Anticipated Length of Stay: Patient will be admitted on an inpatient basis with an anticipated length of stay of greater than 2 midnights secondary to syncopal episode at home secondary to overmedication with metoprolol. Total Time Spent on Date of Encounter in care of patient: 60 mins. This time was spent on one or more of the following: performing physical exam; counseling and coordination of care; obtaining or reviewing history; documenting in the medical record; reviewing/ordering tests, medications or procedures; communicating with other healthcare professionals and discussing with patient's family/caregivers. Chief Complaint: " I passed out at home"    History of Present Illness:  Ирина Roque is a 80 y.o. male with a PMH of CKD stage III, rectal carcinoma, essential hypertension, paroxysmal A-fib, NSTEMI who presents with syncope. Daughter states patient was walking to the kitchen for lunch when he stopped suddenly and started to fall. Daughter caught patient. Patient did not have any prodromal symptoms prior to syncope. Daughter states patient was persistently hypotensive and bradycardic while at home, and daughter has been keeping a log of blood pressures and heart rates. Daughter states that metoprolol dose was increased from prior hospitalization at [facility], and patient has not been the same since discharge. Review of Systems:  Review of Systems   Constitutional:  Negative for diaphoresis, fatigue and fever. HENT:  Negative for congestion, sneezing and sore throat.     Respiratory:  Negative for cough, chest tightness, shortness of breath and wheezing. Cardiovascular:  Negative for chest pain, palpitations and leg swelling. Bradycardia   Gastrointestinal:  Negative for abdominal distention, abdominal pain, constipation, diarrhea, nausea and vomiting. Genitourinary:  Negative for difficulty urinating and dysuria. Musculoskeletal:  Negative for arthralgias and back pain. Neurological:  Positive for syncope. Negative for dizziness, weakness, light-headedness, numbness and headaches. Psychiatric/Behavioral:  Negative for agitation, behavioral problems and confusion. Past Medical and Surgical History:   Past Medical History:   Diagnosis Date    Atrial fibrillation ([address])     Colorectal cancer ([facility])     HL (hearing loss)     Hypertension     Pneumonia     Rectal carcinoma ([facility]) 7/31/2019       Past Surgical History:   Procedure Laterality Date    COLECTOMY      COLOSTOMY         Meds/Allergies:  Prior to Admission medications    Medication Sig Start Date End Date Taking?  Authorizing Provider   albuterol (PROVENTIL HFA,VENTOLIN HFA) 90 mcg/act inhaler Inhale 2 puffs every 6 (six) hours as needed for wheezing 12/3/18   Nba Blount PA-C   aspirin (ECOTRIN LOW STRENGTH) 81 mg EC tablet Take 81 mg by mouth daily    Historical Provider, MD   azelastine (ASTELIN) 0.1 % nasal spray 1 spray into each nostril 2 (two) times a day Use in each nostril as directed 7/3/23   Dimitrios Candelario MD   digoxin (LANOXIN) 0.125 mg tablet Take 1 tablet (125 mcg total) by mouth daily 7/3/23   Dimitrios Candelario MD   ketorolac (ACULAR) 0.5 % ophthalmic solution  10/10/19   Historical Provider, MD   losartan-hydrochlorothiazide [facility]) 50-12.5 mg per tablet Take 1 tablet by mouth daily 7/3/23   Dimitrios Candelario MD   metoprolol tartrate (LOPRESSOR) 25 mg tablet Take 1 tablet (25 mg total) by mouth every 12 (twelve) hours 10/3/23   Dimitrios Candelario MD   montelukast (SINGULAIR) 10 mg tablet Take 1 tablet (10 mg total) by mouth daily at bedtime 10/3/23   Arjun Sinhg MD   Multiple Vitamin (MULTIVITAMIN) capsule Take 1 capsule by mouth daily    Historical Provider, MD   Ostomy Supplies (New Image Closed 2-3/4") Pouch MISC USE AS DIRECTED BY YOUR PHYSICIAN. 10/19/23   Arjun Singh MD   Ostomy Supplies (New Image Flat Barrier 70MM) [facility]  6/30/23   Historical Provider, MD   triamcinolone (KENALOG) 0.1 % cream Apply topically 2 (two) times a day 9/21/22   Terry Kaufman MD     I have reviewed home medications with patient personally. Allergies: No Known Allergies    Social History:  Marital Status:    Patient Pre-hospital Living Situation: Home  Patient Pre-hospital Level of Mobility: walks  Patient Pre-hospital Diet Restrictions: None  Substance Use History:   Social History     Substance and Sexual Activity   Alcohol Use Yes     Social History     Tobacco Use   Smoking Status Every Day    Packs/day: 0.50    Years: 40.00    Total pack years: 20.00    Types: Cigarettes   Smokeless Tobacco Never     Social History     Substance and Sexual Activity   Drug Use No       Family History:  Family History   Problem Relation Age of Onset    Heart disease Mother     Heart disease Father        Physical Exam:     Vitals:   Blood Pressure: (!) 173/67 (11/29/23 1702)  Pulse: (!) 54 (11/29/23 1702)  Temperature: 97.9 °F (36.6 °C) (11/29/23 1702)  Temp Source: Temporal (11/29/23 1341)  Respirations: 17 (11/29/23 1702)  Weight - Scale: 58.1 kg (128 lb 1.4 oz) (11/29/23 1341)  SpO2: 98 % (11/29/23 1702)    Physical Exam  Vitals reviewed. Constitutional:       General: He is not in acute distress. Appearance: Normal appearance. He is not ill-appearing. HENT:      Head: Normocephalic and atraumatic. Nose: Nose normal.      Mouth/Throat:      Mouth: Mucous membranes are moist.      Pharynx: Oropharynx is clear. Eyes:      Extraocular Movements: Extraocular movements intact.       Conjunctiva/sclera: Conjunctivae normal.   Cardiovascular:      Rate and Rhythm: Regular rhythm. Bradycardia present. Pulses: Normal pulses. Heart sounds: Normal heart sounds. No murmur heard. Pulmonary:      Effort: Pulmonary effort is normal. No respiratory distress. Breath sounds: Normal breath sounds. No wheezing or rhonchi. Abdominal:      General: Abdomen is flat. Bowel sounds are normal. There is no distension. Palpations: Abdomen is soft. Tenderness: There is no abdominal tenderness. There is no guarding. Musculoskeletal:         General: Normal range of motion. Cervical back: Normal range of motion. Right lower leg: No edema. Left lower leg: No edema. Skin:     General: Skin is warm. Neurological:      General: No focal deficit present. Mental Status: He is alert. Mental status is at baseline. Motor: No weakness. Comments: Oriented to self, family and that he is in the hospital   Psychiatric:         Mood and Affect: Mood normal.         Behavior: Behavior normal.         Thought Content:  Thought content normal.         Judgment: Judgment normal.          Additional Data:     Lab Results:  Results from last 7 days   Lab Units 11/29/23  1417   WBC Thousand/uL 13.86*   HEMOGLOBIN g/dL 11.1*   HEMATOCRIT % 34.2*   PLATELETS Thousands/uL 264   NEUTROS PCT % 77*   LYMPHS PCT % 13*   MONOS PCT % 9   EOS PCT % 0     Results from last 7 days   Lab Units 11/29/23  1417   SODIUM mmol/L 137   POTASSIUM mmol/L 3.7   CHLORIDE mmol/L 104   CO2 mmol/L 26   BUN mg/dL 38*   CREATININE mg/dL 1.79*   ANION GAP mmol/L 7   CALCIUM mg/dL 8.4   ALBUMIN g/dL 3.5   TOTAL BILIRUBIN mg/dL 0.54   ALK PHOS U/L 46   ALT U/L 20   AST U/L 19   GLUCOSE RANDOM mg/dL 105                       Lines/Drains:  Invasive Devices       Peripheral Intravenous Line  Duration             Peripheral IV 11/29/23 Right;Upper;Ventral (anterior) Arm <1 day                        Imaging: Reviewed radiology reports from this admission including: chest xray  XR chest 1 view portable   Final Result by Tiny Layton MD (11/29 1528)      Evidence of chronic scarring and pleural plaques. No obvious new infiltrate. Mani Speaker Possible tiny apical pneumothorax on the left. Follow-up with inspiratory expiratory views or CT could be utilized. This was discussed with Dr. Amanda Plasencia at 3:25 p.m. Workstation performed: [id number]         CT chest without contrast    (Results Pending)       EKG and Other Studies Reviewed on Admission:   EKG: Sinus Bradycardia. HR 58.    ** Please Note: This note has been constructed using a voice recognition system.  **

## 2023-11-29 NOTE — ASSESSMENT & PLAN NOTE
Had recent NSTEMI 11/16 at [facility] and was treated with heparin drip  Continue aspirin, plavix, statin, metoprolol  EKG with stable/chronic ischemic changes

## 2023-11-29 NOTE — ASSESSMENT & PLAN NOTE
Had recent NSTEMI 11/16 at [facility] and was treated with heparin drip  Continue aspirin, plavix, statin, metoprolol  EKG with stable/chronic ischemic changes

## 2023-11-30 PROBLEM — I95.1 ORTHOSTATIC HYPOTENSION: Status: ACTIVE | Noted: 2019-07-31

## 2023-11-30 LAB
ANION GAP SERPL CALCULATED.3IONS-SCNC: 6 MMOL/L
BUN SERPL-MCNC: 28 MG/DL (ref 5–25)
CALCIUM SERPL-MCNC: 7.9 MG/DL (ref 8.4–10.2)
CHLORIDE SERPL-SCNC: 106 MMOL/L (ref 96–108)
CO2 SERPL-SCNC: 25 MMOL/L (ref 21–32)
CREAT SERPL-MCNC: 1.42 MG/DL (ref 0.6–1.3)
ERYTHROCYTE [DISTWIDTH] IN BLOOD BY AUTOMATED COUNT: 13.8 % (ref 11.6–15.1)
GFR SERPL CREATININE-BSD FRML MDRD: 42 ML/MIN/1.73SQ M
GLUCOSE SERPL-MCNC: 81 MG/DL (ref 65–140)
HCT VFR BLD AUTO: 31.7 % (ref 36.5–49.3)
HGB BLD-MCNC: 10.3 G/DL (ref 12–17)
MAGNESIUM SERPL-MCNC: 1.9 MG/DL (ref 1.9–2.7)
MCH RBC QN AUTO: 32.8 PG (ref 26.8–34.3)
MCHC RBC AUTO-ENTMCNC: 32.5 G/DL (ref 31.4–37.4)
MCV RBC AUTO: 101 FL (ref 82–98)
PLATELET # BLD AUTO: 213 THOUSANDS/UL (ref 149–390)
PMV BLD AUTO: 10.5 FL (ref 8.9–12.7)
POTASSIUM SERPL-SCNC: 3.8 MMOL/L (ref 3.5–5.3)
RBC # BLD AUTO: 3.14 MILLION/UL (ref 3.88–5.62)
SODIUM SERPL-SCNC: 137 MMOL/L (ref 135–147)
WBC # BLD AUTO: 9.51 THOUSAND/UL (ref 4.31–10.16)

## 2023-11-30 PROCEDURE — 97163 PT EVAL HIGH COMPLEX 45 MIN: CPT

## 2023-11-30 PROCEDURE — 83735 ASSAY OF MAGNESIUM: CPT

## 2023-11-30 PROCEDURE — 97167 OT EVAL HIGH COMPLEX 60 MIN: CPT

## 2023-11-30 PROCEDURE — 85027 COMPLETE CBC AUTOMATED: CPT

## 2023-11-30 PROCEDURE — 80048 BASIC METABOLIC PNL TOTAL CA: CPT

## 2023-11-30 PROCEDURE — 99232 SBSQ HOSP IP/OBS MODERATE 35: CPT

## 2023-11-30 RX ADMIN — APIXABAN 2.5 MG: 2.5 TABLET, FILM COATED ORAL at 16:52

## 2023-11-30 RX ADMIN — ATORVASTATIN CALCIUM 20 MG: 20 TABLET, FILM COATED ORAL at 07:49

## 2023-11-30 RX ADMIN — MONTELUKAST 10 MG: 10 TABLET, FILM COATED ORAL at 21:13

## 2023-11-30 RX ADMIN — CLOPIDOGREL BISULFATE 75 MG: 75 TABLET ORAL at 07:49

## 2023-11-30 RX ADMIN — HEPARIN SODIUM 5000 UNITS: 5000 INJECTION INTRAVENOUS; SUBCUTANEOUS at 06:05

## 2023-11-30 RX ADMIN — AZELASTINE 1 SPRAY: 1 SPRAY, METERED NASAL at 21:13

## 2023-11-30 RX ADMIN — AMIODARONE HYDROCHLORIDE 100 MG: 200 TABLET ORAL at 07:49

## 2023-11-30 RX ADMIN — ASPIRIN 81 MG: 81 TABLET, COATED ORAL at 07:48

## 2023-11-30 RX ADMIN — HEPARIN SODIUM 5000 UNITS: 5000 INJECTION INTRAVENOUS; SUBCUTANEOUS at 10:19

## 2023-11-30 RX ADMIN — AZELASTINE 1 SPRAY: 1 SPRAY, METERED NASAL at 06:07

## 2023-11-30 NOTE — ASSESSMENT & PLAN NOTE
Had recent NSTEMI 11/16 at [facility] and was treated with heparin drip  Continue aspirin, statin. Hold metoprolol and Plavix.   Started on low-dose Eliquis 2.5 mg twice daily  EKG with stable/chronic ischemic changes

## 2023-11-30 NOTE — ASSESSMENT & PLAN NOTE
Recently had increase in metoprolol to 50mg every 12 hours which resulted in syncopal episode and hypotension  Was taking metoprolol 25 mg daily prior to hospitalization on 11/16, as well as digoxin every other day and was started on amiodarone daily. Daughter unsure as to why patient is not on anticoagulation  Cardiology consulted -hold metoprolol and digoxin. Plan to resume metoprolol at lower dose if able. Continue amiodarone and aspirin. Hold Plavix. Start Eliquis 2.5 twice daily  EKG on admission with sinus bradycardia.  Continue telemetry

## 2023-11-30 NOTE — PLAN OF CARE
Problem: PHYSICAL THERAPY ADULT  Goal: Performs mobility at highest level of function for planned discharge setting. See evaluation for individualized goals. Description: Treatment/Interventions: Functional transfer training, LE strengthening/ROM, Elevations, Therapeutic exercise, Cognitive reorientation, Endurance training, Patient/family training, Bed mobility, Gait training, Equipment eval/education, Compensatory technique education, OT          See flowsheet documentation for full assessment, interventions and recommendations. Note: Prognosis: Good  Problem List: Decreased strength, Decreased endurance, Impaired balance, Decreased mobility, Decreased cognition, Decreased safety awareness, Impaired judgement, Impaired hearing  Assessment: Pt is a 80 y.o. male seen for PT evaluation s/p admission to [address] [place] [address] on 11/29/2023 with Vasovagal syncope. Order placed for PT services. Upon evaluation: Pt is presenting with impaired functional mobility due to decreased strength, decreased endurance, impaired balance, impaired cognition, impaired hearing, and fall risk requiring  SPV assistance for bed mobility and steadying - mod A assistance for transfers. Pt's clinical presentation is currently unstable/unpredictable given the functional mobility deficits above, especially weakness, decreased activity tolerance, decreased functional mobility tolerance, impaired judgement, and decreased cognition, coupled with fall risks as indicated by AM-PAC 6-Clicks: 04/00 as well as hx of falls and impaired balance and combined with medical complications of hypotension, abnormal renal lab values, abnormal CBC, and need for input for mobility technique/safety. Pt's PMHx and comorbidities that may affect physical performance and progress include: A fib, CKD, HTN, and cancer history and/or treatment.  Personal factors affecting pt at time of IE include: advanced age, past experience, inability to ambulate household distances, and recent fall(s)/fall history. Pt will benefit from continued skilled PT services to address deficits as defined above and to maximize level of functional mobility to facilitate return toward PLOF and improved QOL. From PT/mobility standpoint, recommendation at time of d/c would be Level II (Moderate Resource Intensity pending progress in order to reduce fall risk and maximize pt's functional independence and consistency with mobility in order to facilitate return to PLOF. Recommend trial with walker next 1-2 sessions, ther ex next 1-2 sessions, and stair navigation . Barriers to Discharge: Other (Comment)  Barriers to Discharge Comments: orthostatic hypotension, fall risk  Rehab Resource Intensity Level, PT: II (Moderate Resource Intensity)    See flowsheet documentation for full assessment.

## 2023-11-30 NOTE — PROGRESS NOTES
[facility]  Progress Note  Name: Patsy Sharp I  MRN: [id number]  Unit/Bed#: -01 I Date of Admission: 11/29/2023   Date of Service: 11/30/2023 I Hospital Day: 1    Assessment/Plan   * Vasovagal syncope  Assessment & Plan  Syncopal episode at home and was caught by daughter. Patient unable to recall events prior to syncopal episode. Family also reporting bradycardia and hypotension and at home  Likely secondary to overmedication with metoprolol causing bradycardia and possibly hypotension  Positive Orthostatic vital signs - apply pepito stockings  Telemetry  Gentle IVF has been stopped  Cardiology consulted, plans under a fib    Paroxysmal atrial fibrillation [facility])  Assessment & Plan  Recently had increase in metoprolol to 50mg every 12 hours which resulted in syncopal episode and hypotension  Was taking metoprolol 25 mg daily prior to hospitalization on 11/16, as well as digoxin every other day and was started on amiodarone daily. Daughter unsure as to why patient is not on anticoagulation  Cardiology consulted -hold metoprolol and digoxin. Plan to resume metoprolol at lower dose if able. Continue amiodarone and aspirin. Hold Plavix. Start Eliquis 2.5 twice daily  EKG on admission with sinus bradycardia. Continue telemetry    H/O non-ST elevation myocardial infarction (NSTEMI)  Assessment & Plan  Had recent NSTEMI 11/16 at [facility] and was treated with heparin drip  Continue aspirin, statin. Hold metoprolol and Plavix.   Started on low-dose Eliquis 2.5 mg twice daily  EKG with stable/chronic ischemic changes    Chronic renal failure, stage 3a [facility])  Assessment & Plan  Lab Results   Component Value Date    EGFR 42 11/30/2023    EGFR 31 11/29/2023    EGFR 36 (L) 04/13/2023    CREATININE 1.42 (H) 11/30/2023    CREATININE 1.79 (H) 11/29/2023    CREATININE 1.76 (H) 04/13/2023   Baseline 1.4-1.6, occasionally 1.7  Will give gentle IVF due to dehydration - has since been discontinued  Avoid nephrotoxic agents and hypotension    Rectal carcinoma ([address])  Assessment & Plan  With colostomy present. Continue outpatient follow-up    Orthostatic hypotension  Assessment & Plan  Hyzaar recently discontinued due to poor kidney function  Orthostatics positive with 75 point systolic drop   Hold metoprolol and apply pepito stockings  Continue orthostatic vital signs             VTE Pharmacologic Prophylaxis:   High Risk (Score >/= 5) - Pharmacological DVT Prophylaxis Ordered: apixaban (Eliquis). Sequential Compression Devices Ordered. Mobility:   Basic Mobility Inpatient Raw Score: 18  JH-HLM Goal: 6: Walk 10 steps or more  JH-HLM Achieved: 6: Walk 10 steps or more  HLM Goal achieved. Continue to encourage appropriate mobility. Patient Centered Rounds: I performed bedside rounds with nursing staff today. Discussions with Specialists or Other Care Team Provider: Nursing and CM    Education and Discussions with Family / Patient: Updated  (daughter) at bedside. Total Time Spent on Date of Encounter in care of patient: This time was spent on one or more of the following: performing physical exam; counseling and coordination of care; obtaining or reviewing history; documenting in the medical record; reviewing/ordering tests, medications or procedures; communicating with other healthcare professionals and discussing with patient's family/caregivers. Current Length of Stay: 1 day(s)  Current Patient Status: Inpatient   Certification Statement: The patient will continue to require additional inpatient hospital stay due to monitoring orthostatic hypotension and adjusting cardiac medications  Discharge Plan: Anticipate discharge in 24-48 hrs to discharge location to be determined pending rehab evaluations. Code Status: Level 3 - DNAR and DNI    Subjective:   Patient states that he is feeling well today. Denies chest pain or shortness of breath.   Does not offer any further complaints at this time. Objective:     Vitals:   Temp (24hrs), Av.8 °F (36.6 °C), Min:97.2 °F (36.2 °C), Max:98.4 °F (36.9 °C)    Temp:  [97.2 °F (36.2 °C)-98.4 °F (36.9 °C)] 98.1 °F (36.7 °C)  HR:  [48-99] 61  Resp:  [15-26] 18  BP: ()/(46-74) 101/46  SpO2:  [84 %-100 %] 98 %  Body mass index is 18.92 kg/m². Input and Output Summary (last 24 hours): Intake/Output Summary (Last 24 hours) at 2023 1042  Last data filed at 2023 1442  Gross per 24 hour   Intake 500 ml   Output --   Net 500 ml       Physical Exam:   Physical Exam  Vitals reviewed. Constitutional:       General: He is not in acute distress. Appearance: Normal appearance. He is not ill-appearing. HENT:      Head: Normocephalic and atraumatic. Nose: Nose normal.      Mouth/Throat:      Mouth: Mucous membranes are moist.      Pharynx: Oropharynx is clear. Eyes:      Extraocular Movements: Extraocular movements intact. Conjunctiva/sclera: Conjunctivae normal.   Cardiovascular:      Rate and Rhythm: Regular rhythm. Bradycardia present. Pulses: Normal pulses. Heart sounds: Normal heart sounds. No murmur heard. Pulmonary:      Effort: Pulmonary effort is normal. No respiratory distress. Breath sounds: Normal breath sounds. No wheezing. Abdominal:      General: Abdomen is flat. Bowel sounds are normal. There is no distension. Palpations: Abdomen is soft. Tenderness: There is no abdominal tenderness. There is no guarding. Musculoskeletal:         General: Normal range of motion. Cervical back: Normal range of motion. Right lower leg: No edema. Left lower leg: No edema. Skin:     General: Skin is warm. Neurological:      General: No focal deficit present. Mental Status: He is alert and oriented to person, place, and time. Mental status is at baseline. Motor: No weakness.       Comments: Oriented to self, family and place   Psychiatric: Mood and Affect: Mood normal.         Behavior: Behavior normal.         Thought Content:  Thought content normal.         Judgment: Judgment normal.          Additional Data:     Labs:  Results from last 7 days   Lab Units 11/30/23  0441 11/29/23  1417   WBC Thousand/uL 9.51 13.86*   HEMOGLOBIN g/dL 10.3* 11.1*   HEMATOCRIT % 31.7* 34.2*   PLATELETS Thousands/uL 213 264   NEUTROS PCT %  --  77*   LYMPHS PCT %  --  13*   MONOS PCT %  --  9   EOS PCT %  --  0     Results from last 7 days   Lab Units 11/30/23  0441 11/29/23  1417   SODIUM mmol/L 137 137   POTASSIUM mmol/L 3.8 3.7   CHLORIDE mmol/L 106 104   CO2 mmol/L 25 26   BUN mg/dL 28* 38*   CREATININE mg/dL 1.42* 1.79*   ANION GAP mmol/L 6 7   CALCIUM mg/dL 7.9* 8.4   ALBUMIN g/dL  --  3.5   TOTAL BILIRUBIN mg/dL  --  0.54   ALK PHOS U/L  --  46   ALT U/L  --  20   AST U/L  --  19   GLUCOSE RANDOM mg/dL 81 105                       Lines/Drains:  Invasive Devices       Peripheral Intravenous Line  Duration             Peripheral IV 11/29/23 Right;Upper;Ventral (anterior) Arm <1 day                      Telemetry:  Telemetry Orders (From admission, onward)               24 Hour Telemetry Monitoring  Continuous x 24 Hours (Telem)        Question:  Reason for 24 Hour Telemetry  Answer:  Syncope suspected to be cardiac in origin                     Telemetry Reviewed: Sinus Bradycardia  Indication for Continued Telemetry Use: Arrthymias requiring medical therapy             Imaging: Reviewed radiology reports from this admission including: chest CT scan    Recent Cultures (last 7 days):         Last 24 Hours Medication List:   Current Facility-Administered Medications   Medication Dose Route Frequency Provider Last Rate    acetaminophen  650 mg Oral Q6H PRN Candis Palma PA-C      amiodarone  100 mg Oral Daily Candis Palma PA-C      apixaban  2.5 mg Oral BID [place] Lali Bustillo PA-C      aspirin  81 mg Oral Daily Candis Palma PA-C      atorvastatin  20 mg Oral Daily Candis Palma PA-C      azelastine  1 spray Each Nare HS Candis Palma PA-C      montelukast  10 mg Oral HS Candis Palma PA-C          Today, Patient Was Seen By: Martín Dodd PA-C    **Please Note: This note may have been constructed using a voice recognition system. **

## 2023-11-30 NOTE — OCCUPATIONAL THERAPY NOTE
Occupational Therapy Evaluation     Patient Name: Nicola Ashby. Today's Date: 11/30/2023  Problem List  Principal Problem:    Vasovagal syncope  Active Problems:    Orthostatic hypotension    Paroxysmal atrial fibrillation (HCC)    Rectal carcinoma (HCC)    Chronic renal failure, stage 3a (HCC)    H/O non-ST elevation myocardial infarction (NSTEMI)    Past Medical History  Past Medical History:   Diagnosis Date    Atrial fibrillation ([address])     Colorectal cancer ([address])     HL (hearing loss)     Hypertension     Pneumonia     Rectal carcinoma ([address]) 7/31/2019     Past Surgical History  Past Surgical History:   Procedure Laterality Date    COLECTOMY      COLOSTOMY           11/30/23 6299   Note Type   Note type Evaluation   Pain Assessment   Pain Assessment Tool 0-10   Pain Score No Pain   Restrictions/Precautions   Other Precautions Chair Alarm; Bed Alarm; Fall Risk;Hard of hearing;Telemetry   Home Living   Type of Home House  (1 TAWANDA)   Home Layout Two level;1/2 bath on main level;Bed/bath upstairs  (FF stairs to full bathroom and bed with L railing)   Bathroom Shower/Tub Tub/shower unit  (Sponge bathes)   Bathroom Toilet Standard   Home Equipment Walker  (Does not use AD)   Additional Comments Pt's daughter present and assisting with historical information. Pt living in 2 story home with 1 TAWANDA and full bath/bed on 2nd floor of home. Pt does have 1/2 bath on 1st floor of home. Prior Function   Level of [place] Independent with ADLs; Independent with functional mobility; Independent with IADLS   Lives With Daughter   Receives Help From Family   IADLs Independent with driving; Independent with meal prep; Family/Friend/Other provides medication management   Falls in the last 6 months 0   Comments Pt reporting IND with ADLs, IADLs, transfers and mobility without AD. Pt lives with daughter who is available to assist 24/7 PRN.    General   Family/Caregiver Present Yes   Additional General Comments Daughter present   ADL   LB Dressing Assistance   (SBA)   LB Dressing Deficit Setup;Verbal cueing;Supervision/safety; Increased time to complete; Don/doff R sock; Don/doff L sock   Additional Comments Pt able to don socks while sitting EOB with SBA for safety during postural position changes. Based on functional abilites assessed, pt demo ability to complete UB ADLs with setup/SUP and LB ADLs with SBA-steadying assist.   Bed Mobility   Supine to Sit 5  Supervision   Additional items Increased time required;Verbal cues   Additional Comments Pt completed supine to sit bed mobiltiy with HOB elevated to ~30 deg and no use of bed rails with SUP. No dizziness reported upon sitting. Please see below orthostatic vitals. Transfers   Sit to Stand   (Steadying -> modA)   Additional items Assist x 1; Increased time required;Verbal cues   Stand to Sit   (ModA-> steadying)   Additional items Assist x 1; Increased time required;Verbal cues   Stand pivot   (Steadying assist)   Additional items Assist x 1; Increased time required;Verbal cues   Additional Comments Pt completed initial STS from EOB with steadying assist using no AD. Pt denying dizziness but presenting with posterior lean requiring modA to maintain standing. ModA required for controlled sit on bed. EOB<>recliner SPT completed using RW with steadying assist. Pt again denying no dizziness but presenting with physical s/s of dizziness. Please see below vitals for orthostatic BP.    Balance   Static Sitting Good   Dynamic Sitting Fair +   Static Standing Poor   Dynamic Standing Poor   Activity Tolerance   Activity Tolerance Patient limited by fatigue   Medical Staff Made Aware PTChapo   Nurse Made Aware RN   RUE Assessment   RUE Assessment WFL  (4/5 MMT)   LUE Assessment   LUE Assessment WFL  (4/5 MMT)   Hand Function   Gross Motor Coordination Functional   Fine Motor Coordination Functional   Sensation   Light Touch No apparent deficits   Cognition   Overall Cognitive Status Impaired   Arousal/Participation Alert; Responsive; Cooperative   Attention Attends with cues to redirect   Orientation Level Oriented to person;Oriented to place; Disoriented to time;Disoriented to situation   Memory Decreased long term memory;Decreased short term memory;Decreased recall of recent events;Decreased recall of precautions   Following Commands Follows one step commands with increased time or repetition   Comments Very Teller   Assessment   Limitation Decreased ADL status; Decreased UE strength;Decreased Safe judgement during ADL;Decreased endurance;Decreased self-care trans;Decreased high-level ADLs   Prognosis Fair   Assessment Pt is a 80 y.o. male, admitted to [address] [place] [address] 11/29/2023 d/t experiencing syncopal episode. Dx: vasovagal syncope. Pt recently dc'd from [facility] and has been hypotensive and bradycardic since returning home. Pt with PMHx impacting their performance during ADL tasks, including: CKD3, rectal carcinoma, HTN, A-fib, NSTEMI. Prior to admission to the hospital Pt was performing ADLs without physical assistance. IADLs without physical assistance. Functional transfers/ambulation without physical assistance. Cognitive status was PTA was WNL. OT order placed to assess Pt's ADLs, cognitive status, and performance during functional tasks in order to maximize safety and independence while making most appropriate d/c recommendations.  Pt's clinical presentation is currently unstable/unpredictable given new onset deficits that effect Pt's occupational performance and ability to safely return to OF including decrease activity tolerance, decrease standing balance, decrease performance during ADL tasks, decrease safety awareness , decrease UB MS, decrease generalized strength, decrease activity engagement, decrease performance during functional transfers, steps to enter home, high fall risk, and limited insight to deficits combined with medical complications of hypertension , hypotension, poor blood pressure control, bradycardia, abnormal renal lab values, A-fib, abnormal H&H, and abnormal CBC. Personal factors affecting Pt at time of initial evaluation include: step(s) to enter environment, multi-level environment, advanced age, inability to perform IADLs, inability to perform ADLs, new need for AD, inability to ambulate household distances, inability to navigate community distances, and limited insight into impairments. Pt will benefit from continued skilled OT services to address deficits as defined above and to maximize level independence/participation during ADLs and functional tasks to facilitate return toward PLOF and improved quality of life. From an occupational therapy standpoint, recommendation at time of d/c would be level II (Moderate Resource Intensity) therapy. Plan   Treatment Interventions ADL retraining;Functional transfer training;UE strengthening/ROM; Endurance training;Patient/family training;Neuromuscular reeducation; Energy conservation; Activityengagement   Goal Expiration Date 12/14/23   OT Frequency 3-5x/wk   Discharge Recommendation   Rehab Resource Intensity Level, OT II (Moderate Resource Intensity)   AM-PAC Daily Activity Inpatient   Lower Body Dressing 3   Bathing 3   Toileting 3   Upper Body Dressing 3   Grooming 3   Eating 4   Daily Activity Raw Score 19   Daily Activity Standardized Score (Calc for Raw Score >=11) 40.22   AM-PAC Applied Cognition Inpatient   Following a Speech/Presentation 2   Understanding Ordinary Conversation 3   Taking Medications 2   Remembering Where Things Are Placed or Put Away 2   Remembering List of 4-5 Errands 2   Taking Care of Complicated Tasks 2   Applied Cognition Raw Score 13   Applied Cognition Standardized Score 30.46        11/30/23 0940   Vitals   Pulse 56   Blood Pressure 129/51   MAP (mmHg) 77   Patient Position - Orthostatic VS Lying - Orthostatic VS   Oxygen Therapy   SpO2 97 %        11/30/23 0942   Vitals   Pulse 60   Blood Pressure (!) 118/48   MAP (mmHg) 71   Patient Position - Orthostatic VS Sitting - Orthostatic VS   Oxygen Therapy   SpO2 97 %      11/30/23 0947   Vitals   Blood Pressure (!) 88/46   Patient Position - Orthostatic VS Standing - Orthostatic VS        11/30/23 0955   Vitals   Pulse 61   Blood Pressure (!) 101/46   MAP (mmHg) (!) 64   Patient Position - Orthostatic VS Other (Comment)  (Following EOB<>recliner SPT)   Oxygen Therapy   SpO2 98 %     The patient's raw score on the AM-PAC Daily Activity inpatient short form is 19, standardized score is 40.22, greater than 39.4. Patients at this level are likely to benefit from DC to home. Recommend home with HHOT vs PAR depending upon medical status/orthostatic BP. Please refer to the recommendation of the Occupational Therapist for safe DC planning. Pt goals to be met by 12/14/23. Pt will demonstrate ability to complete grooming/hygiene tasks @ MI after set-up. Pt will demonstrate ability to complete supine<>sit @ MI in order to increase safety and independence during ADL tasks. Pt will demonstrate ability to complete UB ADLs including washing/dressing @ MI in order to increase performance and participation during meaningful tasks  Pt will demonstrate ability to complete LB dressing @ MI in order to increase safety and independence during meaningful tasks. Pt will demonstrate ability to dinesh/doff socks/shoes while sitting EOB @ MI in order to increase safety and independence during meaningful tasks. Pt will demonstrate ability to complete toileting tasks including CM and pericare @ MI in order to increase safety and independence during meaningful tasks. Pt will demonstrate ability to complete EOB, chair, toilet/commode transfers @ MI in order to increase performance and participation during functional tasks. Pt will demonstrate ability to stand for 8 minutes while maintaining G balance with use of RW for UB support PRN.   Pt will demonstrate ability to tolerate 30-35 minute OT session with no vc'ing for deep breathing or use of energy conservation techniques in order to increase activity tolerance during functional tasks. Pt will demonstrate Good carryover of use of energy conservation/compensatory strategies during ADLs and functional tasks in order to increase safety and reduce risk for falls. Pt will demonstrate Good attention and participation in continued evaluation of functional ambulation house hold distances in order to assist with safe d/c planning. Pt will follow 100% simple 2-step commands and be A&O x4 consistently with environmental cues to increase participation in functional activities. Pt will demonstrate 100% carryover of BUE HEP in order to increase BUE MS and increase performance during functional tasks upon d/c home.     Megan Whitlock, OTR/L

## 2023-11-30 NOTE — PLAN OF CARE
Problem: PAIN - ADULT  Goal: Verbalizes/displays adequate comfort level or baseline comfort level  Description: Interventions:  - Encourage patient to monitor pain and request assistance  - Assess pain using appropriate pain scale  - Administer analgesics based on type and severity of pain and evaluate response  - Implement non-pharmacological measures as appropriate and evaluate response  - Consider cultural and social influences on pain and pain management  - Notify physician/advanced practitioner if interventions unsuccessful or patient reports new pain  Outcome: Progressing     Problem: INFECTION - ADULT  Goal: Absence or prevention of progression during hospitalization  Description: INTERVENTIONS:  - Assess and monitor for signs and symptoms of infection  - Monitor lab/diagnostic results  - Monitor all insertion sites, i.e. indwelling lines, tubes, and drains  - Monitor endotracheal if appropriate and nasal secretions for changes in amount and color  - [place] appropriate cooling/warming therapies per order  - Administer medications as ordered  - Instruct and encourage patient and family to use good hand hygiene technique  - Identify and instruct in appropriate isolation precautions for identified infection/condition  Outcome: Progressing     Problem: SAFETY ADULT  Goal: Patient will remain free of falls  Description: INTERVENTIONS:  - Educate patient/family on patient safety including physical limitations  - Instruct patient to call for assistance with activity   - Consult OT/PT to assist with strengthening/mobility   - Keep Call bell within reach  - Keep bed low and locked with side rails adjusted as appropriate  - Keep care items and personal belongings within reach  - Initiate and maintain comfort rounds  - Make Fall Risk Sign visible to staff  - Offer Toileting every 2 Hours, in advance of need  - Initiate/Maintain alarm  - Obtain necessary fall risk management equipment  - Apply yellow socks and bracelet for high fall risk patients  - Consider moving patient to room near nurses station  Outcome: Progressing  Goal: Maintain or return to baseline ADL function  Description: INTERVENTIONS:  -  Assess patient's ability to carry out ADLs; assess patient's baseline for ADL function and identify physical deficits which impact ability to perform ADLs (bathing, care of mouth/teeth, toileting, grooming, dressing, etc.)  - Assess/evaluate cause of self-care deficits   - Assess range of motion  - Assess patient's mobility; develop plan if impaired  - Assess patient's need for assistive devices and provide as appropriate  - Encourage maximum independence but intervene and supervise when necessary  - Involve family in performance of ADLs  - Assess for home care needs following discharge   - Consider OT consult to assist with ADL evaluation and planning for discharge  - Provide patient education as appropriate  Outcome: Progressing  Goal: Maintains/Returns to pre admission functional level  Description: INTERVENTIONS:  - Perform AM-PAC 6 Click Basic Mobility/ Daily Activity assessment daily.  - Set and communicate daily mobility goal to care team and patient/family/caregiver. - Collaborate with rehabilitation services on mobility goals if consulted  - Perform Range of Motion - times a day. - Reposition patient every - hours.   - Dangle patient - times a day  - Stand patient - times a day  - Ambulate patient - times a day  - Out of bed to chair - times a day   - Out of bed for meals - times a day  - Out of bed for toileting  - Record patient progress and toleration of activity level   Outcome: Progressing     Problem: DISCHARGE PLANNING  Goal: Discharge to home or other facility with appropriate resources  Description: INTERVENTIONS:  - Identify barriers to discharge w/patient and caregiver  - Arrange for needed discharge resources and transportation as appropriate  - Identify discharge learning needs (meds, wound care, etc.)  - Arrange for interpretive services to assist at discharge as needed  - Refer to Case Management Department for coordinating discharge planning if the patient needs post-hospital services based on physician/advanced practitioner order or complex needs related to functional status, cognitive ability, or social support system  Outcome: Progressing     Problem: Knowledge Deficit  Goal: Patient/family/caregiver demonstrates understanding of disease process, treatment plan, medications, and discharge instructions  Description: Complete learning assessment and assess knowledge base.   Interventions:  - Provide teaching at level of understanding  - Provide teaching via preferred learning methods  Outcome: Progressing

## 2023-11-30 NOTE — CONSULTS
Consultation - Cardiology   Jase Cobb. 80 y.o. male MRN: [id number]  Unit/Bed#: -01 Encounter: [id number]    Assessment/Plan     Assessment:  Orthostatic hypotension- multifactorial given medication changes and deconditioning with recent hospital stay   PAF- not anticoagulated although recommended in recent hospital stay    - currently sinus bradycardia with PACs   - family reports not a fall risk and no hx of GI bleeding    - on amiodarone 100 mg daily (recently started with titration)  Recent medically managed NSTEMI on DAPT and statin   Hx Colorectal cancer with current colostomy - no hx of GI bleeding per patient  HTN  CKD3    Plan:  Hold BB and digoxin for now, would prefer to resume BB at lower dose if able   Continue amiodarone 100 mg daily  Monitor on telemetry  Push oral fluids   Hold plavix  Continue ASA  Start Eliquis 2.5 mg PO BID (age and weight dosed for PAF) but monitor for nose bleeds. History of Present Illness   Physician Requesting Consult: Lena Jones MD  Reason for Consult / Principal Problem: syncope, PAF  HPI: Jase Cobb. is a 80y.o. year old male with history of PAF, HTN, hx colorectal cancer with colostomy, CKD presents for concerns with dizziness. Recently hospitalized for bronchitis and PAF. Medications adjusted during hospital stay and NSTEMI noted- triaged to medical management. Upon discharge was placed on amiodarone load, BB up titrated and digoxin lowered. Patient went home and daughter reports worsening confusion. He got up from a chair, started walking and closed his eyes, at which time she lowered him to a chair. She reports no true syncope. He has never had falls prior to this incident. She reports episodes of low HR and low BP at home. Currently in bed patient remains slightly confused but has no complaints. Orthostatics are positive. BB and digoxin on hold. Upon questioning, family unsure why patient is not anticoagulated.  He has no hx of abnormal bleeding. He is currently on DAPT and does have a nose bleed. Consults    Review of Systems   Constitutional:  Positive for fatigue. Negative for unexpected weight change. Respiratory:  Negative for chest tightness and shortness of breath. Cardiovascular:  Negative for chest pain, palpitations and leg swelling. Gastrointestinal:  Negative for nausea. Musculoskeletal:  Negative for arthralgias. Neurological:  Positive for dizziness, weakness and light-headedness. Negative for syncope. Psychiatric/Behavioral:  Positive for confusion. Historical Information   Past Medical History:   Diagnosis Date    Atrial fibrillation ([address] )     Colorectal cancer ([facility])     HL (hearing loss)     Hypertension     Pneumonia     Rectal carcinoma ([facility]) 7/31/2019     Past Surgical History:   Procedure Laterality Date    COLECTOMY      COLOSTOMY       Social History     Substance and Sexual Activity   Alcohol Use Yes     Social History     Substance and Sexual Activity   Drug Use No     E-Cigarette/Vaping    E-Cigarette Use Never User      E-Cigarette/Vaping Substances     Social History     Tobacco Use   Smoking Status Every Day    Packs/day: 0.50    Years: 40.00    Total pack years: 20.00    Types: Cigarettes   Smokeless Tobacco Never     Family History: non-contributory    Meds/Allergies   all current active meds have been reviewed  No Known Allergies    Objective   Vitals: Blood pressure (!) 101/46, pulse 61, temperature 98.1 °F (36.7 °C), resp. rate 18, weight 58.1 kg (128 lb 1.4 oz), SpO2 98 %.   Orthostatic Blood Pressures      Flowsheet Row Most Recent Value   Blood Pressure 101/46 filed at 11/30/2023 0955   Patient Position - Orthostatic VS Standing - Orthostatic VS filed at 11/30/2023 0947              Intake/Output Summary (Last 24 hours) at 11/30/2023 0959  Last data filed at 11/29/2023 1442  Gross per 24 hour   Intake 500 ml   Output --   Net 500 ml       Invasive Devices       Peripheral Intravenous Line  Duration             Peripheral IV 11/29/23 Right;Upper;Ventral (anterior) Arm <1 day                    Physical Exam  Vitals and nursing note reviewed. Constitutional:       Appearance: Normal appearance. HENT:      Head: Normocephalic and atraumatic. Eyes:      Pupils: Pupils are equal, round, and reactive to light. Cardiovascular:      Rate and Rhythm: Bradycardia present. Heart sounds: Murmur heard. Pulmonary:      Effort: Pulmonary effort is normal.      Breath sounds: No wheezing. Musculoskeletal:         General: No swelling. Cervical back: Neck supple. Skin:     General: Skin is warm. Capillary Refill: Capillary refill takes less than 2 seconds. Neurological:      General: No focal deficit present. Mental Status: He is alert. Psychiatric:         Mood and Affect: Mood normal.       Lab Results: I have personally reviewed pertinent lab results.     CBC with diff:   Results from last 7 days   Lab Units 11/30/23  0441   WBC Thousand/uL 9.51   RBC Million/uL 3.14*   HEMOGLOBIN g/dL 10.3*   HEMATOCRIT % 31.7*   MCV fL 101*   MCH pg 32.8   MCHC g/dL 32.5   RDW % 13.8   MPV fL 10.5   PLATELETS Thousands/uL 213     CMP:   Results from last 7 days   Lab Units 11/30/23  0441 11/29/23  1417   SODIUM mmol/L 137 137   CHLORIDE mmol/L 106 104   CO2 mmol/L 25 26   BUN mg/dL 28* 38*   CREATININE mg/dL 1.42* 1.79*   CALCIUM mg/dL 7.9* 8.4   AST U/L  --  19   ALT U/L  --  20   ALK PHOS U/L  --  46   EGFR ml/min/1.73sq m 42 31     HS Troponin:   0   Lab Value Date/Time    HSTNI0 12 11/29/2023 1417    HSTNI2 12 11/29/2023 1730    HSTNI4 12 11/29/2023 1730     BNP:   Results from last 7 days   Lab Units 11/30/23  0441   POTASSIUM mmol/L 3.8   CHLORIDE mmol/L 106   CO2 mmol/L 25   BUN mg/dL 28*   CREATININE mg/dL 1.42*   CALCIUM mg/dL 7.9*   EGFR ml/min/1.73sq m 42     Coags:     TSH:     Magnesium:   Results from last 7 days   Lab Units 11/30/23  0441   MAGNESIUM mg/dL 1.9 Lipid Profile:     Imaging: I have personally reviewed pertinent reports. EKG: Sinus bradycardia with short MI  ST & T wave abnormality, consider inferolateral ischemia  Abnormal ECG  No previous ECGs available  Confirmed by Júnior Kent ([id number]) on 11/29/2023 2:54:49 PM      Echo 11/21/2023:    Left Ventricle: Left ventricle is normal in size. Wall thickness was   not well visualized. Systolic function is hyperdynamic with an ejection   fraction over 65%. Wall motion is within normal limits. There is grade I   (mild) diastolic dysfunction. Right Ventricle: Right ventricle cavity is normal. Systolic function is   normal.    Aortic valve appears significantly calcified and restricted in motion. However no significant gradient noted across the aortic valve.

## 2023-11-30 NOTE — ASSESSMENT & PLAN NOTE
Lab Results   Component Value Date    EGFR 42 11/30/2023    EGFR 31 11/29/2023    EGFR 36 (L) 04/13/2023    CREATININE 1.42 (H) 11/30/2023    CREATININE 1.79 (H) 11/29/2023    CREATININE 1.76 (H) 04/13/2023   Baseline 1.4-1.6, occasionally 1.7  Will give gentle IVF due to dehydration - has since been discontinued  Avoid nephrotoxic agents and hypotension

## 2023-11-30 NOTE — PHYSICAL THERAPY NOTE
PHYSICAL THERAPY EVALUATION    NAME:  Lucetta Lesch. DATE: 11/30/23    AGE:   80 y.o. Mrn:   [id number]  ADMIT DX:  Dehydration [E86.0]  Syncope [R55]  DANICA (acute kidney injury) ([address]) [N17.9]  Adverse effect of drug, initial encounter [T50.974C]  AMS (altered mental status) [R41.82]    Past Medical History:   Diagnosis Date    Atrial fibrillation ([address])     Colorectal cancer ([address])     HL (hearing loss)     Hypertension     Pneumonia     Rectal carcinoma ([address]) 7/31/2019     Length Of Stay: 1  Performed at least 2 patient identifiers during session: Name and Birthday  PHYSICAL THERAPY EVALUATION:      11/30/23 0929   PT Last Visit   PT Visit Date 11/30/23   Note Type   Note type Evaluation   Pain Assessment   Pain Assessment Tool 0-10   Pain Score No Pain   Restrictions/Precautions   Weight Bearing Precautions Per Order No   Other Precautions Cognitive; Chair Alarm; Bed Alarm; Fall Risk;Hard of hearing  (orthostatic, colostomy)   Home Living   Type of [address] [place] [address] Dr Two level;1/2 bath on main level;Bed/bath upstairs; Other (Comment)  (1 TA[address]ANDA; FF stairs to 2nd floor bedroom and full bath with L HR)   Bathroom Shower/Tub Tub/shower unit  (sponge bathes)   [address]; Other (Comment)  (just issued walker during prior hospitalization approx. 1 wk ago)   Additional Comments Did not use RW prior   Prior Function   Level of [place] Independent with ADLs; Independent with functional mobility; Independent with IADLS   Lives With Daughter   Receives Help From Family   IADLs Independent with driving; Independent with meal prep; Family/Friend/Other provides medication management  (dtr intermittently provides meals; only drives locally)   Falls in the last 6 months 1 to 4   Comments IND without AD at baseline. General   Additional Pertinent History Dtr reports pt very active normally and IND for mobility.   [facility] [address] and was only home 8 days prior to arriving here at [address]. Family/Caregiver Present Yes  (dtr)   Cognition   Overall Cognitive Status Impaired   Arousal/Participation Cooperative   Orientation Level Oriented to person;Oriented to place; Disoriented to time;Disoriented to situation   Memory Decreased short term memory   Following Commands Follows one step commands with increased time or repetition   RLE Assessment   RLE Assessment WFL   LLE Assessment   LLE Assessment WFL   Light Touch   RLE Light Touch Grossly intact   LLE Light Touch Grossly intact   Bed Mobility   Supine to Sit 5  Supervision   Additional items HOB elevated; Increased time required   Transfers   Sit to Stand   (steadying assist initially)   Additional items Verbal cues   Stand to Sit   (steadying assist)   Additional items Verbal cues   Stand pivot   (steadying assist with use of RW)   Additional items Verbal cues; Increased time required   Additional Comments Pt with initial sit to stand from EOB (no AD) with steadying assist required initially progressing to mod A due to lightheadedness resulting from orthostatic hypotension (see vitals section)   Ambulation/Elevation   Gait Assistance Not tested   Additional items   (unsafe at this time due to orthostasis)   Balance   Static Sitting Good   Dynamic Sitting Fair +   Static Standing Poor   Dynamic Standing Poor   Ambulatory   (not tested due to orthostasis and symptomatic)   Endurance Deficit   Endurance Deficit Yes   Endurance Deficit Description lightheaded   Activity Tolerance   Activity Tolerance Patient limited by fatigue; Other (Comment)  (orthostatic hypotension)   Medical Staff Made Aware OT Cyndi Mo   Assessment   Prognosis Good   Problem List Decreased strength;Decreased endurance; Impaired balance;Decreased mobility; Decreased cognition;Decreased safety awareness; Impaired judgement; Impaired hearing   Barriers to Discharge Other (Comment)   Barriers to Discharge Comments orthostatic hypotension, fall risk Goals   Patient Goals go home   STG Expiration Date 12/14/23   PT Treatment Day 0   Plan   Treatment/Interventions Functional transfer training;LE strengthening/ROM; Elevations; Therapeutic exercise;Cognitive reorientation; Endurance training;Patient/family training;Bed mobility;Gait training;Equipment eval/education; Compensatory technique education;OT   PT Frequency 3-5x/wk   Discharge Recommendation   Rehab Resource Intensity Level, PT II (Moderate Resource Intensity)   Additional Comments if orthostasis resolves pt will likely transition to level III resource intensity   Additional Comments 2 recommend 1st floor set-up at this time; if orthostasis continues consider W/C   AM-PAC Basic Mobility Inpatient   Turning in Flat Bed Without Bedrails 4   Lying on Back to Sitting on Edge of Flat Bed Without Bedrails 3   Moving Bed to Chair 3   Standing Up From Chair Using Arms 3   Walk in Room 3   Climb 3-5 Stairs With Railing 1   Basic Mobility Inpatient Raw Score 17   Basic Mobility Standardized Score 39.67   Highest Level Of Mobility   JH-HLM Goal 5: Stand one or more mins   JH-HLM Achieved 4: Move to chair/commode   End of Consult   Patient Position at End of Consult Bedside chair;Bed/Chair alarm activated; All needs within reach   End of Consult Comments Cardiology MD and PA in room along with pt's dtr     (Please find full objective findings from PT assessment regarding body systems outlined above).       11/30/23 0940   Vitals   Pulse 56   Blood Pressure 129/51   MAP (mmHg) 77   Patient Position - Orthostatic VS Lying - Orthostatic VS      11/30/23 0942   Vitals   Pulse 60   Blood Pressure (!) 118/48   MAP (mmHg) 71   Patient Position - Orthostatic VS Sitting - Orthostatic VS      11/30/23 0947   Vitals   Blood Pressure (!) 88/46   Patient Position - Orthostatic VS Standing - Orthostatic VS       Assessment: Pt is a 80 y.o. male seen for PT evaluation s/p admission to [address] [place] [address] on 11/29/2023 with Vasovagal syncope. Order placed for PT services. Upon evaluation: Pt is presenting with impaired functional mobility due to decreased strength, decreased endurance, impaired balance, impaired cognition, impaired hearing, and fall risk requiring  SPV assistance for bed mobility and steadying - mod A assistance for transfers. Pt's clinical presentation is currently unstable/unpredictable given the functional mobility deficits above, especially weakness, decreased activity tolerance, decreased functional mobility tolerance, impaired judgement, and decreased cognition, coupled with fall risks as indicated by AM-PAC 6-Clicks: 25/57 as well as hx of falls and impaired balance and combined with medical complications of hypotension, abnormal renal lab values, abnormal CBC, and need for input for mobility technique/safety. Pt's PMHx and comorbidities that may affect physical performance and progress include: A fib, CKD, HTN, and cancer history and/or treatment. Personal factors affecting pt at time of IE include: advanced age, past experience, inability to ambulate household distances, and recent fall(s)/fall history. Pt will benefit from continued skilled PT services to address deficits as defined above and to maximize level of functional mobility to facilitate return toward PLOF and improved QOL. From PT/mobility standpoint, recommendation at time of d/c would be Level II (Moderate Resource Intensity pending progress in order to reduce fall risk and maximize pt's functional independence and consistency with mobility in order to facilitate return to PLOF. Recommend trial with walker next 1-2 sessions, ther ex next 1-2 sessions, and stair navigation . The patient's [facility] Basic Mobility Inpatient Short Form Raw Score is 17. A Raw score of greater than 16 suggests the patient may benefit from discharge to home. Please also refer to the recommendation of the Physical Therapist for safe discharge planning.        Goals: Pt will: Perform bed mobility tasks with modified I to reposition in bed and prepare for transfers. Pt will perform transfers with modified I to increase Indep in prior living environment and decrease burden of care and prepare for ambulation. Pt will ambulate with RW for >/= 150 ft with  modified I  to decrease risk for falls, improve gait quality, and promote proper use of assistive device and to access home environment. Pt will complete >/= 12 steps with with unilateral handrail with modified I to return to multilevel home and improve activity tolerance. Pt will participate in objective balance assessment to determine baseline fall risk.         Beau Zambrano, PT,DPT

## 2023-11-30 NOTE — PLAN OF CARE
Problem: PAIN - ADULT  Goal: Verbalizes/displays adequate comfort level or baseline comfort level  Description: Interventions:  - Encourage patient to monitor pain and request assistance  - Assess pain using appropriate pain scale  - Administer analgesics based on type and severity of pain and evaluate response  - Implement non-pharmacological measures as appropriate and evaluate response  - Consider cultural and social influences on pain and pain management  - Notify physician/advanced practitioner if interventions unsuccessful or patient reports new pain  Outcome: Progressing     Problem: INFECTION - ADULT  Goal: Absence or prevention of progression during hospitalization  Description: INTERVENTIONS:  - Assess and monitor for signs and symptoms of infection  - Monitor lab/diagnostic results  - Monitor all insertion sites, i.e. indwelling lines, tubes, and drains  - Monitor endotracheal if appropriate and nasal secretions for changes in amount and color  - [place] appropriate cooling/warming therapies per order  - Administer medications as ordered  - Instruct and encourage patient and family to use good hand hygiene technique  - Identify and instruct in appropriate isolation precautions for identified infection/condition  Outcome: Progressing     Problem: SAFETY ADULT  Goal: Patient will remain free of falls  Description: INTERVENTIONS:  - Educate patient/family on patient safety including physical limitations  - Instruct patient to call for assistance with activity   - Consult OT/PT to assist with strengthening/mobility   - Keep Call bell within reach  - Keep bed low and locked with side rails adjusted as appropriate  - Keep care items and personal belongings within reach  - Initiate and maintain comfort rounds  - Make Fall Risk Sign visible to staff  - Offer Toileting every  Hours, in advance of need  - Initiate/Maintain alarm  - Obtain necessary fall risk management equipment  - Apply yellow socks and bracelet for high fall risk patients  - Consider moving patient to room near nurses station  Outcome: Progressing  Goal: Maintain or return to baseline ADL function  Description: INTERVENTIONS:  -  Assess patient's ability to carry out ADLs; assess patient's baseline for ADL function and identify physical deficits which impact ability to perform ADLs (bathing, care of mouth/teeth, toileting, grooming, dressing, etc.)  - Assess/evaluate cause of self-care deficits   - Assess range of motion  - Assess patient's mobility; develop plan if impaired  - Assess patient's need for assistive devices and provide as appropriate  - Encourage maximum independence but intervene and supervise when necessary  - Involve family in performance of ADLs  - Assess for home care needs following discharge   - Consider OT consult to assist with ADL evaluation and planning for discharge  - Provide patient education as appropriate  Outcome: Progressing  Goal: Maintains/Returns to pre admission functional level  Description: INTERVENTIONS:  - Perform AM-PAC 6 Click Basic Mobility/ Daily Activity assessment daily.  - Set and communicate daily mobility goal to care team and patient/family/caregiver. - Collaborate with rehabilitation services on mobility goals if consulted  - Perform Range of Motion  times a day. - Reposition patient every hours.   - Dangle patient times a day  - Stand patient  times a day  - Ambulate patient  times a day  - Out of bed to chair  times a day   - Out of bed for meals  times a day  - Out of bed for toileting  - Record patient progress and toleration of activity level   Outcome: Progressing     Problem: DISCHARGE PLANNING  Goal: Discharge to home or other facility with appropriate resources  Description: INTERVENTIONS:  - Identify barriers to discharge w/patient and caregiver  - Arrange for needed discharge resources and transportation as appropriate  - Identify discharge learning needs (meds, wound care, etc.)  - Arrange for interpretive services to assist at discharge as needed  - Refer to Case Management Department for coordinating discharge planning if the patient needs post-hospital services based on physician/advanced practitioner order or complex needs related to functional status, cognitive ability, or social support system  Outcome: Progressing     Problem: Knowledge Deficit  Goal: Patient/family/caregiver demonstrates understanding of disease process, treatment plan, medications, and discharge instructions  Description: Complete learning assessment and assess knowledge base.   Interventions:  - Provide teaching at level of understanding  - Provide teaching via preferred learning methods  Outcome: Progressing

## 2023-11-30 NOTE — ASSESSMENT & PLAN NOTE
Syncopal episode at home and was caught by daughter. Patient unable to recall events prior to syncopal episode.   Family also reporting bradycardia and hypotension and at home  Likely secondary to overmedication with metoprolol causing bradycardia and possibly hypotension  Positive Orthostatic vital signs - apply pepito stockings  Telemetry  Gentle IVF has been stopped  Cardiology consulted, plans under a fib

## 2023-11-30 NOTE — ASSESSMENT & PLAN NOTE
Hyzaar recently discontinued due to poor kidney function  Orthostatics positive with 75 point systolic drop   Hold metoprolol and apply pepito stockings  Continue orthostatic vital signs

## 2023-11-30 NOTE — CASE MANAGEMENT
Case Management Discharge Planning Note    Patient name Rafy Davis. Location /-01 MRN [id number]  : 1930 Date 2023       Current Admission Date: 2023  Current Admission Diagnosis:Vasovagal syncope   Patient Active Problem List    Diagnosis Date Noted    Vasovagal syncope 2023    H/O non-ST elevation myocardial infarction (NSTEMI) 2023    Chronic renal failure, stage 3a ([facility]) 06/15/2021    Mild protein-calorie malnutrition ([facility]) 2021    Orthostatic hypotension 2019    Paroxysmal atrial fibrillation ([facility]) 2019    Rectal carcinoma ([facility]) 2019    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    Non-seasonal allergic rhinitis 2019    Mixed stress and urge urinary incontinence 2019      LOS (days): 1  Geometric Mean LOS (GMLOS) (days): 2.40  Days to GMLOS:1.3     OBJECTIVE:  Risk of Unplanned Readmission Score: 17.88         Current admission status: Inpatient   Preferred Pharmacy:   [address] [facility], [address] [address]  Phone: [phone] Fax: [phone] [address] [place] [address], [address] [facility] [address]  Phone: [phone] Fax: [phone] [facility], [address] [facility] [facility] [address]  [address] [facility] [address]  Phone: [phone] Fax: [phone] [address] [facility], [address] [facility]  [address] [place] [address]  Phone: [phone] Fax: [phone] [facility]  [address] [address]  Phone: [phone] Fax: [phone]    Primary Care Provider: Valorie Doan MD    Primary Insurance: MEDICARE  Secondary Insurance: AARP    DISCHARGE DETAILS:     [facility] has been recommended for pt at time of discharge. Pt is agreeable to [address] [place] [address], does not have a preference to any [address] [place] [address] agency.  Cm placing blanket referral to [address] 0 CM LM for pts daughter, Isaac Kaur, who pt lives with to discuss dc plan, requested return call     A post acute care recommendation was made by your care team for [address]. Discussed Freedom of Choice with patient. List of agencies given to patient via in person. patient aware the list is custom filtered for them by preference  and that [facility] post acute providers are designated.

## 2023-11-30 NOTE — PLAN OF CARE
Problem: OCCUPATIONAL THERAPY ADULT  Goal: Performs self-care activities at highest level of function for planned discharge setting. See evaluation for individualized goals. Description: Treatment Interventions: ADL retraining, Functional transfer training, UE strengthening/ROM, Endurance training, Patient/family training, Neuromuscular reeducation, Energy conservation, Activityengagement          See flowsheet documentation for full assessment, interventions and recommendations. Note: Limitation: Decreased ADL status, Decreased UE strength, Decreased Safe judgement during ADL, Decreased endurance, Decreased self-care trans, Decreased high-level ADLs  Prognosis: Fair  Assessment: Pt is a 80 y.o. male, admitted to [address] [place] [address] 11/29/2023 d/t experiencing syncopal episode. Dx: vasovagal syncope. Pt recently dc'd from [facility] and has been hypotensive and bradycardic since returning home. Pt with PMHx impacting their performance during ADL tasks, including: CKD3, rectal carcinoma, HTN, A-fib, NSTEMI. Prior to admission to the hospital Pt was performing ADLs without physical assistance. IADLs without physical assistance. Functional transfers/ambulation without physical assistance. Cognitive status was PTA was WNL. OT order placed to assess Pt's ADLs, cognitive status, and performance during functional tasks in order to maximize safety and independence while making most appropriate d/c recommendations.  Pt's clinical presentation is currently unstable/unpredictable given new onset deficits that effect Pt's occupational performance and ability to safely return to OF including decrease activity tolerance, decrease standing balance, decrease performance during ADL tasks, decrease safety awareness , decrease UB MS, decrease generalized strength, decrease activity engagement, decrease performance during functional transfers, steps to enter home, high fall risk, and limited insight to deficits combined with medical complications of hypertension , hypotension, poor blood pressure control, bradycardia, abnormal renal lab values, A-fib, abnormal H&H, and abnormal CBC. Personal factors affecting Pt at time of initial evaluation include: step(s) to enter environment, multi-level environment, advanced age, inability to perform IADLs, inability to perform ADLs, new need for AD, inability to ambulate household distances, inability to navigate community distances, and limited insight into impairments. Pt will benefit from continued skilled OT services to address deficits as defined above and to maximize level independence/participation during ADLs and functional tasks to facilitate return toward PLOF and improved quality of life. From an occupational therapy standpoint, recommendation at time of d/c would be level II (Moderate Resource Intensity) therapy.      Rehab Resource Intensity Level, OT: II (Moderate Resource Intensity)

## 2023-12-01 LAB
ANION GAP SERPL CALCULATED.3IONS-SCNC: 5 MMOL/L
BUN SERPL-MCNC: 28 MG/DL (ref 5–25)
CALCIUM SERPL-MCNC: 8 MG/DL (ref 8.4–10.2)
CHLORIDE SERPL-SCNC: 106 MMOL/L (ref 96–108)
CO2 SERPL-SCNC: 26 MMOL/L (ref 21–32)
CORTIS SERPL-MCNC: 10.7 UG/DL
CREAT SERPL-MCNC: 1.59 MG/DL (ref 0.6–1.3)
DME PARACHUTE DELIVERY DATE REQUESTED: NORMAL
DME PARACHUTE ITEM DESCRIPTION: NORMAL
DME PARACHUTE ITEM DESCRIPTION: NORMAL
DME PARACHUTE ORDER STATUS: NORMAL
DME PARACHUTE SUPPLIER NAME: NORMAL
DME PARACHUTE SUPPLIER PHONE: NORMAL
ERYTHROCYTE [DISTWIDTH] IN BLOOD BY AUTOMATED COUNT: 13.9 % (ref 11.6–15.1)
GFR SERPL CREATININE-BSD FRML MDRD: 36 ML/MIN/1.73SQ M
GLUCOSE SERPL-MCNC: 86 MG/DL (ref 65–140)
HCT VFR BLD AUTO: 33.7 % (ref 36.5–49.3)
HGB BLD-MCNC: 11 G/DL (ref 12–17)
MAGNESIUM SERPL-MCNC: 2 MG/DL (ref 1.9–2.7)
MCH RBC QN AUTO: 32.5 PG (ref 26.8–34.3)
MCHC RBC AUTO-ENTMCNC: 32.6 G/DL (ref 31.4–37.4)
MCV RBC AUTO: 100 FL (ref 82–98)
PLATELET # BLD AUTO: 197 THOUSANDS/UL (ref 149–390)
PMV BLD AUTO: 10.2 FL (ref 8.9–12.7)
POTASSIUM SERPL-SCNC: 3.9 MMOL/L (ref 3.5–5.3)
RBC # BLD AUTO: 3.38 MILLION/UL (ref 3.88–5.62)
SODIUM SERPL-SCNC: 137 MMOL/L (ref 135–147)
WBC # BLD AUTO: 9.17 THOUSAND/UL (ref 4.31–10.16)

## 2023-12-01 PROCEDURE — 97116 GAIT TRAINING THERAPY: CPT

## 2023-12-01 PROCEDURE — 85027 COMPLETE CBC AUTOMATED: CPT

## 2023-12-01 PROCEDURE — 97530 THERAPEUTIC ACTIVITIES: CPT

## 2023-12-01 PROCEDURE — 99232 SBSQ HOSP IP/OBS MODERATE 35: CPT

## 2023-12-01 PROCEDURE — 97535 SELF CARE MNGMENT TRAINING: CPT

## 2023-12-01 PROCEDURE — 80048 BASIC METABOLIC PNL TOTAL CA: CPT

## 2023-12-01 PROCEDURE — 83735 ASSAY OF MAGNESIUM: CPT

## 2023-12-01 PROCEDURE — 82533 TOTAL CORTISOL: CPT

## 2023-12-01 RX ORDER — METOPROLOL SUCCINATE 25 MG/1
12.5 TABLET, EXTENDED RELEASE ORAL DAILY
Status: DISCONTINUED | OUTPATIENT
Start: 2023-12-01 | End: 2023-12-03 | Stop reason: HOSPADM

## 2023-12-01 RX ADMIN — METOPROLOL SUCCINATE 12.5 MG: 25 TABLET, EXTENDED RELEASE ORAL at 19:45

## 2023-12-01 RX ADMIN — AMIODARONE HYDROCHLORIDE 100 MG: 200 TABLET ORAL at 09:22

## 2023-12-01 RX ADMIN — APIXABAN 2.5 MG: 2.5 TABLET, FILM COATED ORAL at 17:30

## 2023-12-01 RX ADMIN — ATORVASTATIN CALCIUM 20 MG: 20 TABLET, FILM COATED ORAL at 09:22

## 2023-12-01 RX ADMIN — AZELASTINE 1 SPRAY: 1 SPRAY, METERED NASAL at 21:30

## 2023-12-01 RX ADMIN — APIXABAN 2.5 MG: 2.5 TABLET, FILM COATED ORAL at 09:22

## 2023-12-01 RX ADMIN — MONTELUKAST 10 MG: 10 TABLET, FILM COATED ORAL at 21:30

## 2023-12-01 RX ADMIN — ASPIRIN 81 MG: 81 TABLET, COATED ORAL at 09:21

## 2023-12-01 NOTE — PLAN OF CARE
Problem: PAIN - ADULT  Goal: Verbalizes/displays adequate comfort level or baseline comfort level  Description: Interventions:  - Encourage patient to monitor pain and request assistance  - Assess pain using appropriate pain scale  - Administer analgesics based on type and severity of pain and evaluate response  - Implement non-pharmacological measures as appropriate and evaluate response  - Consider cultural and social influences on pain and pain management  - Notify physician/advanced practitioner if interventions unsuccessful or patient reports new pain  Outcome: Progressing     Problem: INFECTION - ADULT  Goal: Absence or prevention of progression during hospitalization  Description: INTERVENTIONS:  - Assess and monitor for signs and symptoms of infection  - Monitor lab/diagnostic results  - Monitor all insertion sites, i.e. indwelling lines, tubes, and drains  - Monitor endotracheal if appropriate and nasal secretions for changes in amount and color  - [place] appropriate cooling/warming therapies per order  - Administer medications as ordered  - Instruct and encourage patient and family to use good hand hygiene technique  - Identify and instruct in appropriate isolation precautions for identified infection/condition  Outcome: Progressing     Problem: SAFETY ADULT  Goal: Patient will remain free of falls  Description: INTERVENTIONS:  - Educate patient/family on patient safety including physical limitations  - Instruct patient to call for assistance with activity   - Consult OT/PT to assist with strengthening/mobility   - Keep Call bell within reach  - Keep bed low and locked with side rails adjusted as appropriate  - Keep care items and personal belongings within reach  - Initiate and maintain comfort rounds  - Make Fall Risk Sign visible to staff  - Offer Toileting every 2 Hours, in advance of need  - Initiate/Maintain alarm  - Obtain necessary fall risk management equipment  - Apply yellow socks and bracelet for high fall risk patients  - Consider moving patient to room near nurses station  Outcome: Progressing  Goal: Maintain or return to baseline ADL function  Description: INTERVENTIONS:  -  Assess patient's ability to carry out ADLs; assess patient's baseline for ADL function and identify physical deficits which impact ability to perform ADLs (bathing, care of mouth/teeth, toileting, grooming, dressing, etc.)  - Assess/evaluate cause of self-care deficits   - Assess range of motion  - Assess patient's mobility; develop plan if impaired  - Assess patient's need for assistive devices and provide as appropriate  - Encourage maximum independence but intervene and supervise when necessary  - Involve family in performance of ADLs  - Assess for home care needs following discharge   - Consider OT consult to assist with ADL evaluation and planning for discharge  - Provide patient education as appropriate  Outcome: Progressing  Goal: Maintains/Returns to pre admission functional level  Description: INTERVENTIONS:  - Perform AM-PAC 6 Click Basic Mobility/ Daily Activity assessment daily.  - Set and communicate daily mobility goal to care team and patient/family/caregiver. - Collaborate with rehabilitation services on mobility goals if consulted  - Perform Range of Motion 3 times a day. - Reposition patient every 2 hours.   - Dangle patient 3 times a day  - Stand patient 3 times a day  - Ambulate patient 3 times a day  - Out of bed to chair 3 times a day   - Out of bed for meals 3 times a day  - Out of bed for toileting  - Record patient progress and toleration of activity level   Outcome: Progressing     Problem: DISCHARGE PLANNING  Goal: Discharge to home or other facility with appropriate resources  Description: INTERVENTIONS:  - Identify barriers to discharge w/patient and caregiver  - Arrange for needed discharge resources and transportation as appropriate  - Identify discharge learning needs (meds, wound care, etc.)  - Arrange for interpretive services to assist at discharge as needed  - Refer to Case Management Department for coordinating discharge planning if the patient needs post-hospital services based on physician/advanced practitioner order or complex needs related to functional status, cognitive ability, or social support system  Outcome: Progressing     Problem: Knowledge Deficit  Goal: Patient/family/caregiver demonstrates understanding of disease process, treatment plan, medications, and discharge instructions  Description: Complete learning assessment and assess knowledge base.   Interventions:  - Provide teaching at level of understanding  - Provide teaching via preferred learning methods  Outcome: Progressing

## 2023-12-01 NOTE — PROGRESS NOTES
Patient:    MRN:  [id number]    Aidin Request ID:  [id number]    Level of care reserved:  [address]    Partner Reserved:  [address] [facility], [address] [phone]    Clinical needs requested:    Geography searched:  [address] [place] [address]    Start of Service:    Request sent:  5:18pm EST on 11/30/2023 by Kelly Wood    Partner reserved:  11:50am EST on 12/1/2023 by Eleni Hubbard    Choice list shared:  10:19am EST on 12/1/2023 by Eleni Hubbard

## 2023-12-01 NOTE — PROGRESS NOTES
Progress Note - Cardiology   [facility]. 80 y.o. male MRN: [id number]  Unit/Bed#: -01 Encounter: [id number]    Assessment:  Orthostatic hypotension- multifactorial given medication changes and deconditioning with recent hospital stay    - improved  PAF- now on Eliquis 2.5 mg PO BID                - currently sinus bradycardia with PACs               - family reports not a fall risk and no hx of GI bleeding                - on amiodarone 100 mg daily (recently started with titration)  Recent medically managed NSTEMI on ASA and statin   Hx Colorectal cancer with current colostomy - no hx of GI bleeding per patient  HTN  CKD3     Plan:  Continue Eliquis and ASA  Monitor nose bleeds  Monitor on telemetry  Use Toprol xl 12.5 mg daily at night only  Compression stockings  Recommend zio as an OP    Subjective/Objective     Subjective: no chest pain. Less dizzy upon standing this AM. Walking halls more comfortably. No sob. Objective: in sinus on telemetry. HR improved. Orthostatics still positive but improved. Vitals: BP (!) 87/40   Pulse 91   Temp 97.5 °F (36.4 °C)   Resp 18   Wt 58.1 kg (128 lb 1.4 oz)   SpO2 99%   BMI 18.92 kg/m²   Vitals:    11/29/23 1341   Weight: 58.1 kg (128 lb 1.4 oz)     Orthostatic Blood Pressures      Flowsheet Row Most Recent Value   Blood Pressure 87/40 filed at 12/01/2023 6249   Patient Position - Orthostatic VS Standing for 3 minutes - Orthostatic VS filed at 12/01/2023 0854              Intake/Output Summary (Last 24 hours) at 12/1/2023 0953  Last data filed at 12/1/2023 0845  Gross per 24 hour   Intake 840 ml   Output 775 ml   Net 65 ml       Invasive Devices       Peripheral Intravenous Line  Duration             Peripheral IV 11/29/23 Right;Upper;Ventral (anterior) Arm 1 day                      Physical Exam:               Physical Exam  Vitals and nursing note reviewed. Constitutional:       Appearance: Normal appearance.    HENT:      Head: Normocephalic and atraumatic. Eyes:      Pupils: Pupils are equal, round, and reactive to light. Cardiovascular:      Rate and Rhythm: normal sinus      Heart sounds: Murmur heard. Pulmonary:      Effort: Pulmonary effort is normal.      Breath sounds: No wheezing. Musculoskeletal:         General: No swelling. Cervical back: Neck supple. Skin:     General: Skin is warm. Capillary Refill: Capillary refill takes less than 2 seconds. Neurological:      General: No focal deficit present. Mental Status: He is alert. Psychiatric:         Mood and Affect: Mood normal.        Lab Results: I have personally reviewed pertinent lab results. CBC with diff:   Results from last 7 days   Lab Units 12/01/23  0442   WBC Thousand/uL 9.17   RBC Million/uL 3.38*   HEMOGLOBIN g/dL 11.0*   HEMATOCRIT % 33.7*   MCV fL 100*   MCH pg 32.5   MCHC g/dL 32.6   RDW % 13.9   MPV fL 10.2   PLATELETS Thousands/uL 197     CMP:   Results from last 7 days   Lab Units 12/01/23  0442 11/30/23  0441 11/29/23  1417   SODIUM mmol/L 137   < > 137   CHLORIDE mmol/L 106   < > 104   CO2 mmol/L 26   < > 26   BUN mg/dL 28*   < > 38*   CREATININE mg/dL 1.59*   < > 1.79*   CALCIUM mg/dL 8.0*   < > 8.4   AST U/L  --   --  19   ALT U/L  --   --  20   ALK PHOS U/L  --   --  46   EGFR ml/min/1.73sq m 36   < > 31    < > = values in this interval not displayed. HS Troponin:   0   Lab Value Date/Time    HSTNI0 12 11/29/2023 1417    HSTNI2 12 11/29/2023 1730    HSTNI4 12 11/29/2023 1730     BNP:   Results from last 7 days   Lab Units 12/01/23  0442   POTASSIUM mmol/L 3.9   CHLORIDE mmol/L 106   CO2 mmol/L 26   BUN mg/dL 28*   CREATININE mg/dL 1.59*   CALCIUM mg/dL 8.0*   EGFR ml/min/1.73sq m 36     Coags:     TSH:     Magnesium:   Results from last 7 days   Lab Units 12/01/23  0442   MAGNESIUM mg/dL 2.0     Lipid Profile:     Imaging: I have personally reviewed pertinent reports.     EKG: sinus on telemetry    Echo //:    Left Ventricle: Left ventricle is normal in size. Wall thickness was   not well visualized. Systolic function is hyperdynamic with an ejection   fraction over 65%. Wall motion is within normal limits. There is grade I   (mild) diastolic dysfunction. Right Ventricle: Right ventricle cavity is normal. Systolic function is   normal.    Aortic valve appears significantly calcified and restricted in motion. However no significant gradient noted across the aortic valve.

## 2023-12-01 NOTE — PLAN OF CARE
Problem: PHYSICAL THERAPY ADULT  Goal: Performs mobility at highest level of function for planned discharge setting. See evaluation for individualized goals. Description: Treatment/Interventions: Functional transfer training, LE strengthening/ROM, Elevations, Therapeutic exercise, Cognitive reorientation, Endurance training, Patient/family training, Bed mobility, Gait training, Equipment eval/education, Compensatory technique education, OT          See flowsheet documentation for full assessment, interventions and recommendations. Outcome: Progressing  Note: Prognosis: Good  Problem List: Decreased strength, Decreased endurance, Impaired balance, Decreased mobility, Decreased cognition, Decreased safety awareness, Impaired judgement, Impaired hearing  Assessment: Pt tolerates tx session fairly. Interventions consisting of transfer and gait training with use of RW. Pt continues with orthostatic hypotension however, much less symptomatic today; demonstrating ability to ambulate with RW. Pt also continues with impaired cognitive status, disoriented to time and situation. Dtr reports concerns over continued orthostasis. PT educates pt and dtr about obtaining transport W/C - even if orthostasis resolves, it can be beneficial to have if pt were to have intermittent lightheadedness at home and be beneficial for community mobility. Barriers to Discharge: Other (Comment)  Barriers to Discharge Comments: orthostatic hypotension, fall risk  Rehab Resource Intensity Level, PT: II (Moderate Resource Intensity)    See flowsheet documentation for full assessment.

## 2023-12-01 NOTE — ASSESSMENT & PLAN NOTE
Lab Results   Component Value Date    EGFR 36 12/01/2023    EGFR 42 11/30/2023    EGFR 31 11/29/2023    CREATININE 1.59 (H) 12/01/2023    CREATININE 1.42 (H) 11/30/2023    CREATININE 1.79 (H) 11/29/2023   Baseline 1.4-1.6, occasionally 1.7  Will give gentle IVF due to dehydration - has since been discontinued  Avoid nephrotoxic agents and hypotension

## 2023-12-01 NOTE — PHYSICAL THERAPY NOTE
PHYSICAL THERAPY TREATMENT NOTE    NAME:  Ti Walton. DATE: 12/01/23    Length Of Stay: 2  Performed at least 2 patient identifiers during session: Name and Birthday    TREATMENT FLOW SHEET:      12/01/23 0841   PT Last Visit   PT Visit Date 12/01/23   Note Type   Note Type Treatment   Pain Assessment   Pain Assessment Tool 0-10   Pain Score 4   Pain Location/Orientation Orientation: Bilateral;Location: Foot; Other (Comment)  (heels)   Restrictions/Precautions   Weight Bearing Precautions Per Order No   Other Precautions Chair Alarm; Bed Alarm;Cognitive; Fall Risk;Hard of hearing  (colostomy)   General   Chart Reviewed Yes   Response to Previous Treatment Patient with no complaints from previous session.    Family/Caregiver Present Yes   Cognition   Overall Cognitive Status Impaired   Arousal/Participation Cooperative   Attention Attends with cues to redirect   Orientation Level Disoriented to time;Oriented to place;Oriented to person;Disoriented to situation   Memory Decreased recall of recent events;Decreased short term memory;Decreased long term memory   Following Commands Follows one step commands with increased time or repetition   Bed Mobility   Supine to Sit 5  Supervision   Additional items Increased time required   Transfers   Sit to Stand   (SBA)   Additional items Verbal cues   Stand to Sit   (SBA)   Additional items Verbal cues   Stand pivot   (SBA)   Additional items Verbal cues   Additional Comments Use of RW for transfers   Ambulation/Elevation   Gait pattern Improper Weight shift;Decreased foot clearance;Decreased heel strike;Decreased toe off   Gait Assistance   (SBA)   Additional items Assist x 1;Verbal cues   Assistive Device Rolling walker   Distance 11 ft x 2; 45 ft x 1   Balance   Static Sitting Good   Dynamic Sitting Fair +   Static Standing Fair -   Dynamic Standing Fair -   Ambulatory Fair -   Endurance Deficit   Endurance Deficit Yes   Activity Tolerance   Activity Tolerance Patient limited by fatigue; Other (Comment)  (orthostatic hypotension but less symptomatic today)   Medical Staff Made Aware OT Pranay PENA [place]   Assessment   Prognosis Good   Assessment Pt tolerates tx session fairly. Interventions consisting of transfer and gait training with use of RW. Pt continues with orthostatic hypotension however, much less symptomatic today; demonstrating ability to ambulate with RW. Pt also continues with impaired cognitive status, disoriented to time and situation. Dtr reports concerns over continued orthostasis. PT educates pt and dtr about obtaining transport W/C - even if orthostasis resolves, it can be beneficial to have if pt were to have intermittent lightheadedness at home and be beneficial for community mobility. Barriers to Discharge Other (Comment)   Barriers to Discharge Comments orthostatic hypotension, fall risk   Goals   STG Expiration Date 12/14/23   PT Treatment Day 1   Plan   Treatment/Interventions ADL retraining;Functional transfer training;LE strengthening/ROM; Elevations; Therapeutic exercise; Endurance training;Cognitive reorientation;Patient/family training;Equipment eval/education; Bed mobility;Gait training; Compensatory technique education;OT   Progress Slow progress, medical status limitations   PT Frequency 3-5x/wk   Discharge Recommendation   Rehab Resource Intensity Level, PT II (Moderate Resource Intensity)   Additional Comments continues with orthostasis; recommend transport W/C & potential portable ramp for home entry   Additional Comments 2 recommend 1st floor set up   [facility] Mobility Inpatient   Turning in Flat Bed Without Bedrails 4   Lying on Back to Sitting on Edge of Flat Bed Without Bedrails 3   Moving Bed to Chair 3   Standing Up From Chair Using Arms 3   Walk in Room 3   Climb 3-5 Stairs With Railing 3   Basic Mobility Inpatient Raw Score 19   Basic Mobility Standardized Score 42.48   Highest Level Of Mobility   -[facility] Goal 6: Walk 10 steps or more   -HLM Achieved 7: Walk 25 feet or more   Education   Education Provided Mobility training; Other  (orthostatic precautions)   Patient Demonstrates verbal understanding   End of Consult   Patient Position at End of Consult Bedside chair;Bed/Chair alarm activated; All needs within reach   End of Consult Comments dtr present      12/01/23 0850   Vitals   Pulse 80   Blood Pressure 131/51   MAP (mmHg) 78   Patient Position - Orthostatic VS Sitting - Orthostatic VS      12/01/23 0852   Vitals   Pulse 89   Blood Pressure (!) 88/40   MAP (mmHg) (!) 56   Patient Position - Orthostatic VS Standing - Orthostatic VS      12/01/23 0854   Vitals   Pulse 91   Blood Pressure (!) 87/40   MAP (mmHg) (!) 56   Patient Position - Orthostatic VS Standing for 3 minutes - Orthostatic VS       The patient's AM-PAC Basic Mobility Inpatient Short Form Raw Score is 19. A Raw score of greater than 16 suggests the patient may benefit from discharge to home. Please also refer to the recommendation of the Physical Therapist for safe discharge planning.        Willie Negron, PT,DPT

## 2023-12-01 NOTE — PLAN OF CARE
Problem: PAIN - ADULT  Goal: Verbalizes/displays adequate comfort level or baseline comfort level  Description: Interventions:  - Encourage patient to monitor pain and request assistance  - Assess pain using appropriate pain scale  - Administer analgesics based on type and severity of pain and evaluate response  - Implement non-pharmacological measures as appropriate and evaluate response  - Consider cultural and social influences on pain and pain management  - Notify physician/advanced practitioner if interventions unsuccessful or patient reports new pain  Outcome: Progressing     Problem: INFECTION - ADULT  Goal: Absence or prevention of progression during hospitalization  Description: INTERVENTIONS:  - Assess and monitor for signs and symptoms of infection  - Monitor lab/diagnostic results  - Monitor all insertion sites, i.e. indwelling lines, tubes, and drains  - Monitor endotracheal if appropriate and nasal secretions for changes in amount and color  - [place] appropriate cooling/warming therapies per order  - Administer medications as ordered  - Instruct and encourage patient and family to use good hand hygiene technique  - Identify and instruct in appropriate isolation precautions for identified infection/condition  Outcome: Progressing     Problem: SAFETY ADULT  Goal: Patient will remain free of falls  Description: INTERVENTIONS:  - Educate patient/family on patient safety including physical limitations  - Instruct patient to call for assistance with activity   - Consult OT/PT to assist with strengthening/mobility   - Keep Call bell within reach  - Keep bed low and locked with side rails adjusted as appropriate  - Keep care items and personal belongings within reach  - Initiate and maintain comfort rounds  - Make Fall Risk Sign visible to staff    - Apply yellow socks and bracelet for high fall risk patients  - Consider moving patient to room near nurses station  Outcome: Progressing  Goal: Maintain or return to baseline ADL function  Description: INTERVENTIONS:  -  Assess patient's ability to carry out ADLs; assess patient's baseline for ADL function and identify physical deficits which impact ability to perform ADLs (bathing, care of mouth/teeth, toileting, grooming, dressing, etc.)  - Assess/evaluate cause of self-care deficits   - Assess range of motion  - Assess patient's mobility; develop plan if impaired  - Assess patient's need for assistive devices and provide as appropriate  - Encourage maximum independence but intervene and supervise when necessary  - Involve family in performance of ADLs  - Assess for home care needs following discharge   - Consider OT consult to assist with ADL evaluation and planning for discharge  - Provide patient education as appropriate  Outcome: Progressing  Goal: Maintains/Returns to pre admission functional level  Description: INTERVENTIONS:  - Perform AM-PAC 6 Click Basic Mobility/ Daily Activity assessment daily.  - Set and communicate daily mobility goal to care team and patient/family/caregiver.    - Collaborate with rehabilitation services on mobility goals if consulted    - Out of bed for toileting  - Record patient progress and toleration of activity level   Outcome: Progressing

## 2023-12-01 NOTE — ASSESSMENT & PLAN NOTE
Had recent NSTEMI 11/16 at [facility] and was treated with heparin drip  Continue aspirin, statin. Resumed metoprolol at 12.5 nightly. Hold plavix.   Started on low-dose Eliquis 2.5 mg twice daily  EKG with stable/chronic ischemic changes

## 2023-12-01 NOTE — PLAN OF CARE
Problem: OCCUPATIONAL THERAPY ADULT  Goal: Performs self-care activities at highest level of function for planned discharge setting. See evaluation for individualized goals. Description: Treatment Interventions: ADL retraining, Functional transfer training, UE strengthening/ROM, Endurance training, Patient/family training, Neuromuscular reeducation, Energy conservation, Activityengagement          See flowsheet documentation for full assessment, interventions and recommendations. Outcome: Progressing  Note: Limitation: Decreased ADL status, Decreased UE strength, Decreased Safe judgement during ADL, Decreased endurance, Decreased self-care trans, Decreased high-level ADLs  Prognosis: Fair  Assessment: Pt seen for OT treatment session focusing on ADLs, balance, transfers, UE strength, energy conservation, family training, and activity engagement. Pt greeted in bed Hob elevated at start of session. Pt alert and cooperative throughout session. Pt [place] and increased verbal cues to redirect. Pt needed increased verbal cuing for safe technique with RW, and hand placement. Pt with fair sitting balance and fair standing balance. Pt tolerated treatment well with increased dizziness after standing at sink for grooming activities (brushing dentures, mouth wash, and hair care. Pt completed grooming, UB bathing, LB bathing, UB dressing (change gown), and toilet transfer during treatment today. Pt requires supervision with grooming, [place] for UB bathing and UB dressing, supervision from LB bathing, supervision for supine to sit, and SBA for transfers. Pt reports minimal pain with feet and heels and some dizziness after edge of sink. Pt's vitals include: /51, standin/40, standing after 2-3 mins 87/40. Pt denies symptoms of hypotension during activity. Requested sitting break after grooming at edge of sink. Pt ended session seated in chair with alarm activated. Call bell and phone within reach.  All needs met and pt reports no further questions at this time. Continue to recommend moderate resource intensity when medically cleared. OT will continue to follow pt on caseload.      Rehab Resource Intensity Level, OT: II (Moderate Resource Intensity)

## 2023-12-01 NOTE — CASE MANAGEMENT
Case Management Discharge Planning Note    Patient name Berline Riedel. Location /-01 MRN [id number]  : 1930 Date 2023       Current Admission Date: 2023  Current Admission Diagnosis:Vasovagal syncope   Patient Active Problem List    Diagnosis Date Noted    Vasovagal syncope 2023    H/O non-ST elevation myocardial infarction (NSTEMI) 2023    Chronic renal failure, stage 3a ([address] ) 06/15/2021    Mild protein-calorie malnutrition ([address]) 2021    Orthostatic hypotension 2019    Paroxysmal atrial fibrillation ([address] [facility]) 2019    Rectal carcinoma ([address] [facility]) 2019    Colostomy present ([address] [facility]) 2019    Non-seasonal allergic rhinitis 2019    Mixed stress and urge urinary incontinence 2019      LOS (days): 2  Geometric Mean LOS (GMLOS) (days): 2.40  Days to GMLOS:0.5     OBJECTIVE:  Risk of Unplanned Readmission Score: 17.86         Current admission status: Inpatient   Preferred Pharmacy:   [address] [facility], [address] [facility] [address]  Phone: [phone] Fax: [phone] [address] [place] [address], [address] [facility] [address]  Phone: [phone] Fax: [phone], [address] [facility]  [address] [place] [address]  Phone: [phone] Fax: [phone] [facility] [address] [facility], [address] [facility]  [address] [place] [address]  Phone: [phone] Fax: [phone] [address] [address]  Phone: [phone] Fax: [phone]    Primary Care Provider: Arjun Singh MD    Primary Insurance: MEDICARE  Secondary Insurance: AARP    DISCHARGE DETAILS:    Reviewed accepting providers for [address] in [address] [place] [address]. Identified patient was active with [address] with [facility] prior to admission.   CM spoke to patient and he would like to continue these services upon discharge. [facility] was reserved, and notified [facility] of dc in 1-2 days.

## 2023-12-01 NOTE — ASSESSMENT & PLAN NOTE
Hyzaar recently discontinued due to poor kidney function  Orthostatics positive with 75 point systolic drop, improving  Hold metoprolol and apply pepito stockings  Continue orthostatic vital signs

## 2023-12-01 NOTE — PROGRESS NOTES
[address] [facility]  Progress Note  Name: Alexis Reyes I  MRN: [id number]  Unit/Bed#: -01 I Date of Admission: 11/29/2023   Date of Service: 12/1/2023 I Hospital Day: 2    Assessment/Plan   * Vasovagal syncope  Assessment & Plan  Syncopal episode at home and was caught by daughter. Patient unable to recall events prior to syncopal episode. Family also reporting bradycardia and hypotension and at home  Likely secondary to overmedication with metoprolol causing bradycardia and possibly hypotension  Positive Orthostatic vital signs - apply pepito stockings  Telemetry  Gentle IVF has been stopped  Cardiology consulted, plans under a fib    Paroxysmal atrial fibrillation [facility])  Assessment & Plan  Recently had increase in metoprolol to 50mg every 12 hours which resulted in syncopal episode and hypotension  Was taking metoprolol 25 mg daily prior to hospitalization on 11/16, as well as digoxin every other day and was started on amiodarone daily. Daughter unsure as to why patient is not on anticoagulation  Cardiology consulted -hold digoxin. Resume metoprolol at 12.5 mg nightly. Continue amiodarone and aspirin. Hold Plavix. Start Eliquis 2.5 twice daily  EKG on admission with sinus bradycardia. Continue telemetry    H/O non-ST elevation myocardial infarction (NSTEMI)  Assessment & Plan  Had recent NSTEMI 11/16 at [facility] and was treated with heparin drip  Continue aspirin, statin. Resumed metoprolol at 12.5 nightly. Hold plavix.   Started on low-dose Eliquis 2.5 mg twice daily  EKG with stable/chronic ischemic changes    Chronic renal failure, stage 3a [facility])  Assessment & Plan  Lab Results   Component Value Date    EGFR 36 12/01/2023    EGFR 42 11/30/2023    EGFR 31 11/29/2023    CREATININE 1.59 (H) 12/01/2023    CREATININE 1.42 (H) 11/30/2023    CREATININE 1.79 (H) 11/29/2023   Baseline 1.4-1.6, occasionally 1.7  Will give gentle IVF due to dehydration - has since been discontinued  Avoid nephrotoxic agents and hypotension    Rectal carcinoma ([address])  Assessment & Plan  With colostomy present. Continue outpatient follow-up    Orthostatic hypotension  Assessment & Plan  Hyzaar recently discontinued due to poor kidney function  Orthostatics positive with 75 point systolic drop, improving  Hold metoprolol and apply pepito stockings  Continue orthostatic vital signs             VTE Pharmacologic Prophylaxis:   Moderate Risk (Score 3-4) - Pharmacological DVT Prophylaxis Ordered: apixaban (Eliquis). Mobility:   Basic Mobility Inpatient Raw Score: 19  JH-HLM Goal: 6: Walk 10 steps or more  JH-HLM Achieved: 7: Walk 25 feet or more  HLM Goal achieved. Continue to encourage appropriate mobility. Patient Centered Rounds: I performed bedside rounds with nursing staff today. Discussions with Specialists or Other Care Team Provider: cardiology, nursing and cm    Education and Discussions with Family / Patient: Updated  (daughter) at bedside. Total Time Spent on Date of Encounter in care of patient: This time was spent on one or more of the following: performing physical exam; counseling and coordination of care; obtaining or reviewing history; documenting in the medical record; reviewing/ordering tests, medications or procedures; communicating with other healthcare professionals and discussing with patient's family/caregivers. Current Length of Stay: 2 day(s)  Current Patient Status: Inpatient   Certification Statement: The patient will continue to require additional inpatient hospital stay due to orthostatic hypotension requiring medication management  Discharge Plan: Anticipate discharge in 24-48 hrs to home with home services. Code Status: Level 3 - DNAR and DNI    Subjective:   Patient states that he is feeling well today. Denies chest pain or shortness of breath. Does not offer any further complaints at this time.     Objective:     Vitals:   Temp (24hrs), Av.8 °F (36.6 °C), Min:97.5 °F (36.4 °C), Max:97.9 °F (36.6 °C)    Temp:  [97.5 °F (36.4 °C)-97.9 °F (36.6 °C)] 97.9 °F (36.6 °C)  HR:  [52-91] 68  Resp:  [17-18] 17  BP: ()/(40-73) 140/55  SpO2:  [96 %-99 %] 98 %  Body mass index is 18.92 kg/m². Input and Output Summary (last 24 hours): Intake/Output Summary (Last 24 hours) at 2023 1111  Last data filed at 2023 0845  Gross per 24 hour   Intake 840 ml   Output 775 ml   Net 65 ml       Physical Exam:   Physical Exam  Vitals reviewed. Constitutional:       General: He is not in acute distress. Appearance: Normal appearance. He is not ill-appearing. HENT:      Head: Normocephalic and atraumatic. Nose: Nose normal.      Mouth/Throat:      Mouth: Mucous membranes are moist.      Pharynx: Oropharynx is clear. Eyes:      Extraocular Movements: Extraocular movements intact. Conjunctiva/sclera: Conjunctivae normal.   Cardiovascular:      Rate and Rhythm: Normal rate and regular rhythm. Pulses: Normal pulses. Heart sounds: Normal heart sounds. No murmur heard. Pulmonary:      Effort: Pulmonary effort is normal. No respiratory distress. Breath sounds: Normal breath sounds. No wheezing. Abdominal:      General: Abdomen is flat. Bowel sounds are normal. There is no distension. Palpations: Abdomen is soft. Tenderness: There is no abdominal tenderness. There is no guarding. Musculoskeletal:         General: Normal range of motion. Cervical back: Normal range of motion. Right lower leg: No edema. Left lower leg: No edema. Skin:     General: Skin is warm. Neurological:      General: No focal deficit present. Mental Status: He is alert. Mental status is at baseline. Motor: No weakness. Comments: Oriented to self, family and that he is in the hospital   Psychiatric:         Mood and Affect: Mood normal.         Behavior: Behavior normal.         Thought Content:  Thought content normal.         Judgment: Judgment normal.          Additional Data:     Labs:  Results from last 7 days   Lab Units 12/01/23  0442 11/30/23  0441 11/29/23  1417   WBC Thousand/uL 9.17   < > 13.86*   HEMOGLOBIN g/dL 11.0*   < > 11.1*   HEMATOCRIT % 33.7*   < > 34.2*   PLATELETS Thousands/uL 197   < > 264   NEUTROS PCT %  --   --  77*   LYMPHS PCT %  --   --  13*   MONOS PCT %  --   --  9   EOS PCT %  --   --  0    < > = values in this interval not displayed. Results from last 7 days   Lab Units 12/01/23  0442 11/30/23  0441 11/29/23  1417   SODIUM mmol/L 137   < > 137   POTASSIUM mmol/L 3.9   < > 3.7   CHLORIDE mmol/L 106   < > 104   CO2 mmol/L 26   < > 26   BUN mg/dL 28*   < > 38*   CREATININE mg/dL 1.59*   < > 1.79*   ANION GAP mmol/L 5   < > 7   CALCIUM mg/dL 8.0*   < > 8.4   ALBUMIN g/dL  --   --  3.5   TOTAL BILIRUBIN mg/dL  --   --  0.54   ALK PHOS U/L  --   --  46   ALT U/L  --   --  20   AST U/L  --   --  19   GLUCOSE RANDOM mg/dL 86   < > 105    < > = values in this interval not displayed.                        Lines/Drains:  Invasive Devices       Peripheral Intravenous Line  Duration             Peripheral IV 11/29/23 Right;Upper;Ventral (anterior) Arm 1 day                      Telemetry:  Telemetry Orders (From admission, onward)               24 Hour Telemetry Monitoring  Continuous x 24 Hours (Telem)        Question:  Reason for 24 Hour Telemetry  Answer:  Syncope suspected to be cardiac in origin                     Telemetry Reviewed: Normal Sinus Rhythm  Indication for Continued Telemetry Use: Arrthymias requiring medical therapy             Imaging: Reviewed radiology reports from this admission including: chest CT scan    Recent Cultures (last 7 days):         Last 24 Hours Medication List:   Current Facility-Administered Medications   Medication Dose Route Frequency Provider Last Rate    acetaminophen  650 mg Oral Q6H PRN Candis Palma PA-C      amiodarone  100 mg Oral Daily Candis LIZETH Palma      apixaban  2.5 mg Oral BID [place], [place]      aspirin  81 mg Oral Daily Candis Palma PA-C      atorvastatin  20 mg Oral Daily Candis Palma PA-C      azelastine  1 spray Each Nare HS Candis Palma PA-C      metoprolol succinate  12.5 mg Oral Daily [place] Lali Bustillo PA-C      montelukast  10 mg Oral HS Candis Palma PA-C          Today, Patient Was Seen By: Jacqui Brown PA-C    **Please Note: This note may have been constructed using a voice recognition system. **

## 2023-12-01 NOTE — CASE MANAGEMENT
Case Management Discharge Planning Note    Patient name Ambrocio Burch.   Location /-01 MRN [id number]  : 1930 Date 2023       Current Admission Date: 2023  Current Admission Diagnosis:Vasovagal syncope   Patient Active Problem List    Diagnosis Date Noted    Vasovagal syncope 2023    H/O non-ST elevation myocardial infarction (NSTEMI) 2023    Chronic renal failure, stage 3a ([address]) 06/15/2021    Mild protein-calorie malnutrition ([address]) 2021    Orthostatic hypotension 2019    Paroxysmal atrial fibrillation ([address]) 2019    Rectal carcinoma ([address]) 2019    Colostomy present ([address] [facility]) 2019    Non-seasonal allergic rhinitis 2019    Mixed stress and urge urinary incontinence 2019      LOS (days): 2  Geometric Mean LOS (GMLOS) (days): 2.40  Days to GMLOS:0.3     OBJECTIVE:  Risk of Unplanned Readmission Score: 18.14         Current admission status: Inpatient   Preferred Pharmacy:   [address] [facility], [address] [facility] [address]  Phone: [phone] Fax: [phone] [address] [place] [address], [address] [facility] [address]  Phone: [phone] Fax: [phone], [address] [facility] [address] [facility]  [address] [facility] [address]  Phone: [phone] Fax: [address] [facility] [address] [facility], [address] [address]  Phone: [phone] Fax: [phone] [address]  Phone: [phone] Fax: [phone]    Primary Care Provider: Elizabeth Tinsley MD    Primary Insurance: MEDICARE  Secondary Insurance: AARP    DISCHARGE DETAILS:    Discharge planning discussed with[de-identified] patient, daughter and son in law  Freedom of Choice: Yes  Comments - Freedom of Choice: Discussed HHC vs STR-- they want patient home with [address] as they were active prior to admission, they are available for assisting [facility]. Discussed the use of transport WC and they are in agreement for CM to order one. CM contacted family/caregiver?: Yes  Were Treatment Team discharge recommendations reviewed with patient/caregiver?: Yes  Did patient/caregiver verbalize understanding of patient care needs?: Yes  Were patient/caregiver advised of the risks associated with not following Treatment Team discharge recommendations?: Yes    Contacts  Patient Contacts: Maciel Ortiz  Relationship to Patient[de-identified] Family  Contact Method: In Person  Reason/Outcome: Discharge Planning         DME Referral Provided  Referral made for DME?: Yes  DME referral completed for the following items[de-identified]  (transport wheelchair)  DME Supplier Name[de-identified] [id number] of choice was provided in regards to needing a home medical equipment company, pt does not have preference. Pt is aware that we frequently utilized [employer] as we have a working relationship with this company. Patients choice is to use [employer]. .    Other Referral/Resources/Interventions Provided:  Interventions: DME  Referral Comments: Ordered transport wheelchair via [place]         Treatment Team Recommendation: Home with [address]  Discharge Destination Plan[de-identified] Home with [address] is set up  Transport at Discharge : Family

## 2023-12-01 NOTE — OCCUPATIONAL THERAPY NOTE
Occupational Therapy Progress Note     Patient Name: Alexis Ureña. Today's Date: 12/1/2023  Problem List  Principal Problem:    Vasovagal syncope  Active Problems:    Orthostatic hypotension    Paroxysmal atrial fibrillation (HCC)    Rectal carcinoma (HCC)    Chronic renal failure, stage 3a ([address])    H/O non-ST elevation myocardial infarction (NSTEMI)       12/01/23 0842   OT Last Visit   OT Visit Date 12/01/23   Note Type   Note Type Treatment   Pain Assessment   Pain Assessment Tool 0-10   Pain Score 4   Pain Location/Orientation Orientation: Bilateral;Location: Foot; Other (Comment)  (feet and heels)   Restrictions/Precautions   Weight Bearing Precautions Per Order No   Other Precautions Chair Alarm; Bed Alarm;Cognitive; Fall Risk;Hard of hearing   ADL   Where Assessed Edge of bed   Grooming Assistance 5  Supervision/Setup   Grooming Deficit Setup;Steadying;Supervision/safety;Standing with assistive device;Brushing hair;Denture care;Wash/dry hands; Wash/dry face  (use of RW at sink for support)   UB Bathing Assistance 4  Minimal Assistance   UB Bathing Deficit Setup;Verbal cueing;Supervision/safety; Impulsive; Increased time to complete; Chest;Left arm;Right arm; Abdomen  (assistance with back)   LB Bathing Assistance 5  Supervision/Setup   LB Bathing Deficit Setup;Steadying;Verbal cueing;Supervision/safety; Increased time to complete; Buttocks;Right upper leg;Left upper leg;Perineal area  (assistance with back of upper legs)   UB Dressing Assistance 4  Minimal Assistance   UB Dressing Deficit Setup;Verbal cueing;Supervision/safety; Thread RUE; Thread LUE;Pull around back   Functional Standing Tolerance   Time ~10 minutes   Activity Standing edge of sink for grooming   Comments use of RW for support   Bed Mobility   Supine to Sit 5  Supervision   Additional items Increased time required; Bedrails   Additional Comments completed bed mobility with HOB elevated, no dizziness reported upon sitting   Transfers   Sit to Stand   (SBA)   Additional items Verbal cues; Increased time required   Stand to Sit   (SBA)   Additional items Verbal cues; Increased time required;Armrests   Stand pivot   (SBA)   Additional items Verbal cues  (use of RW for support)   Toilet transfer   (SBA)   Additional items Increased time required;Standard toilet;Verbal cues  (use of grab bar and RW for support, increased verbal cues for assistance)   Additional Comments   (use of RW for transfers, no dizziness reported)   Functional Mobility   Functional Mobility 5  Supervision   Additional items Rolling walker   Toilet Transfers   Toilet Transfer From Bed   Toilet Transfer Type To   Toilet Transfer to Standard toilet   Toilet Transfer Technique Ambulating   Toilet Transfers   (SBA)   Toilet Transfers Comments   (use of RW for support and verbal cues for use of grab bar and safe technique)   Cognition   Overall Cognitive Status Impaired   Arousal/Participation Cooperative   Attention Attends with cues to redirect   Orientation Level Oriented to place; Disoriented to time;Oriented to person;Disoriented to situation   Memory Decreased recall of recent events;Decreased short term memory;Decreased recall of precautions   Following Commands Follows one step commands with increased time or repetition   Comments   ([place])   Activity Tolerance   Activity Tolerance Patient tolerated treatment well;Patient limited by fatigue;Treatment limited secondary to medical complications (Comment)   Medical Staff Made Aware   (PT Chapo, RN aware, Pt seen for co-treatment with skilled PT due to clinically/unpredictable presentation, medical complexity, fall risk, functional/physical limitations, limited activity toleranc which is decline from PLOF and may impact function.)   Assessment   Assessment Pt seen for OT treatment session focusing on ADLs, balance, transfers, UE strength, energy conservation, family training, and activity engagement.  Pt greeted in bed Hob elevated at start of session. Pt alert and cooperative throughout session. Pt [place] and increased verbal cues to redirect. Pt needed increased verbal cuing for safe technique with RW, and hand placement. Pt with fair sitting balance and fair standing balance. Pt tolerated treatment well with increased dizziness after standing at sink for grooming activities (brushing dentures, mouth wash, and hair care. Pt completed grooming, UB bathing, LB bathing, UB dressing (change gown), and toilet transfer during treatment today. Pt requires supervision with grooming, Eligio for UB bathing and UB dressing, supervision from LB bathing, supervision for supine to sit, and SBA for transfers. Pt reports minimal pain with feet and heels and some dizziness after edge of sink. Pt's vitals include: /51, standin/40, standing after 2-3 mins 87/40. Pt denies symptoms of hypotension during activity. Requested sitting break after grooming at edge of sink. Pt ended session seated in chair with alarm activated. Call bell and phone within reach. All needs met and pt reports no further questions at this time. Continue to recommend moderate resource intensity when medically cleared. OT will continue to follow pt on caseload. Plan   Treatment Interventions ADL retraining;Functional transfer training;UE strengthening/ROM; Endurance training;Patient/family training;Neuromuscular reeducation; Energy conservation; Activityengagement   Goal Expiration Date 23   OT Treatment Day 1   OT Frequency 3-5x/wk   Discharge Recommendation   Rehab Resource Intensity Level, OT II (Moderate Resource Intensity)   Additional Comments  The patient's raw score on the AM-PAC Daily Activity Inpatient Short Form is 18. A raw score of less than 19 suggests the patient may benefit from discharge to post-acute rehabilitation services. Please refer to the recommendation of the Occupational Therapist for safe discharge planning.    AM-PAC Daily Activity Inpatient   Lower Body Dressing 3   Bathing 2   Toileting 3   Upper Body Dressing 3   Grooming 3   Eating 4   Daily Activity Raw Score 18   Daily Activity Standardized Score (Calc for Raw Score >=11) 38.66   AM-PAC Applied Cognition Inpatient   Following a Speech/Presentation 2   Understanding Ordinary Conversation 3   Taking Medications 2   Remembering Where Things Are Placed or Put Away 2   Remembering List of 4-5 Errands 2   Taking Care of Complicated Tasks 2   Applied Cognition Raw Score 13   Applied Cognition Standardized Score 30.46   End of Consult   Education Provided Yes;Family or social support of family present for education by provider   Patient Position at End of Consult Bedside chair;Bed/Chair alarm activated; All needs within reach   Nurse Communication Nurse aware of consult   Gulshan Leyva, OT

## 2023-12-01 NOTE — ASSESSMENT & PLAN NOTE
Recently had increase in metoprolol to 50mg every 12 hours which resulted in syncopal episode and hypotension  Was taking metoprolol 25 mg daily prior to hospitalization on 11/16, as well as digoxin every other day and was started on amiodarone daily. Daughter unsure as to why patient is not on anticoagulation  Cardiology consulted -hold digoxin. Resume metoprolol at 12.5 mg nightly. Continue amiodarone and aspirin. Hold Plavix. Start Eliquis 2.5 twice daily  EKG on admission with sinus bradycardia.  Continue telemetry

## 2023-12-02 LAB
ANION GAP SERPL CALCULATED.3IONS-SCNC: 4 MMOL/L
BUN SERPL-MCNC: 31 MG/DL (ref 5–25)
CALCIUM SERPL-MCNC: 8.2 MG/DL (ref 8.4–10.2)
CHLORIDE SERPL-SCNC: 106 MMOL/L (ref 96–108)
CO2 SERPL-SCNC: 28 MMOL/L (ref 21–32)
CREAT SERPL-MCNC: 1.65 MG/DL (ref 0.6–1.3)
GFR SERPL CREATININE-BSD FRML MDRD: 35 ML/MIN/1.73SQ M
GLUCOSE SERPL-MCNC: 89 MG/DL (ref 65–140)
POTASSIUM SERPL-SCNC: 4.3 MMOL/L (ref 3.5–5.3)
SODIUM SERPL-SCNC: 138 MMOL/L (ref 135–147)

## 2023-12-02 PROCEDURE — 99232 SBSQ HOSP IP/OBS MODERATE 35: CPT

## 2023-12-02 PROCEDURE — 80048 BASIC METABOLIC PNL TOTAL CA: CPT

## 2023-12-02 RX ADMIN — AZELASTINE 1 SPRAY: 1 SPRAY, METERED NASAL at 21:09

## 2023-12-02 RX ADMIN — ATORVASTATIN CALCIUM 20 MG: 20 TABLET, FILM COATED ORAL at 11:26

## 2023-12-02 RX ADMIN — APIXABAN 2.5 MG: 2.5 TABLET, FILM COATED ORAL at 11:27

## 2023-12-02 RX ADMIN — APIXABAN 2.5 MG: 2.5 TABLET, FILM COATED ORAL at 17:17

## 2023-12-02 RX ADMIN — ASPIRIN 81 MG: 81 TABLET, COATED ORAL at 11:27

## 2023-12-02 RX ADMIN — MONTELUKAST 10 MG: 10 TABLET, FILM COATED ORAL at 21:09

## 2023-12-02 RX ADMIN — METOPROLOL SUCCINATE 12.5 MG: 25 TABLET, EXTENDED RELEASE ORAL at 11:27

## 2023-12-02 RX ADMIN — AMIODARONE HYDROCHLORIDE 100 MG: 200 TABLET ORAL at 11:26

## 2023-12-02 NOTE — ASSESSMENT & PLAN NOTE
Had recent NSTEMI 11/16 at [facility] and was treated with heparin drip  Continue aspirin, statin. Resumed metoprolol at 12.5 nightly. Hold plavix.   Started on low-dose Eliquis 2.5 mg twice daily  EKG with stable/chronic ischemic changes

## 2023-12-02 NOTE — ASSESSMENT & PLAN NOTE
Lab Results   Component Value Date    EGFR 35 12/02/2023    EGFR 36 12/01/2023    EGFR 42 11/30/2023    CREATININE 1.65 (H) 12/02/2023    CREATININE 1.59 (H) 12/01/2023    CREATININE 1.42 (H) 11/30/2023   Baseline 1.4-1.6, occasionally 1.7  Will give gentle IVF due to dehydration - has since been discontinued  Avoid nephrotoxic agents and hypotension

## 2023-12-02 NOTE — PROGRESS NOTES
[address] [facility]  Progress Note  Name: Hannah Neal. I  MRN: [id number]  Unit/Bed#: -01 I Date of Admission: 11/29/2023   Date of Service: 12/2/2023 I Hospital Day: 3    Assessment/Plan   * Vasovagal syncope  Assessment & Plan  Syncopal episode at home and was caught by daughter. Patient unable to recall events prior to syncopal episode. Family also reporting bradycardia and hypotension and at home  Likely secondary to overmedication with metoprolol causing bradycardia and possibly hypotension  Positive Orthostatic vital signs - apply pepito stockings  Telemetry  Gentle IVF has been stopped  Cardiology consulted, plans under a fib    Paroxysmal atrial fibrillation [facility])  Assessment & Plan  Recently had increase in metoprolol to 50mg every 12 hours which resulted in syncopal episode and hypotension  Was taking metoprolol 25 mg daily prior to hospitalization on 11/16, as well as digoxin every other day and was started on amiodarone daily. Daughter unsure as to why patient is not on anticoagulation  Cardiology consulted -hold digoxin. Resume metoprolol at 12.5 mg nightly. Continue amiodarone and aspirin. Hold Plavix. Start Eliquis 2.5 twice daily  EKG on admission with sinus bradycardia. Continue telemetry    H/O non-ST elevation myocardial infarction (NSTEMI)  Assessment & Plan  Had recent NSTEMI 11/16 at [facility] and was treated with heparin drip  Continue aspirin, statin. Resumed metoprolol at 12.5 nightly. Hold plavix.   Started on low-dose Eliquis 2.5 mg twice daily  EKG with stable/chronic ischemic changes    Chronic renal failure, stage 3a [facility])  Assessment & Plan  Lab Results   Component Value Date    EGFR 35 12/02/2023    EGFR 36 12/01/2023    EGFR 42 11/30/2023    CREATININE 1.65 (H) 12/02/2023    CREATININE 1.59 (H) 12/01/2023    CREATININE 1.42 (H) 11/30/2023   Baseline 1.4-1.6, occasionally 1.7  Will give gentle IVF due to dehydration - has since been discontinued  Avoid nephrotoxic agents and hypotension    Rectal carcinoma ([address])  Assessment & Plan  With colostomy present. Continue outpatient follow-up    Orthostatic hypotension  Assessment & Plan  Hyzaar recently discontinued due to poor kidney function  Orthostatics positive with 75 point systolic drop, improving  Hold metoprolol and apply pepito stockings  Continue orthostatic vital signs             VTE Pharmacologic Prophylaxis:   Moderate Risk (Score 3-4) - Pharmacological DVT Prophylaxis Ordered: apixaban (Eliquis). Mobility:   Basic Mobility Inpatient Raw Score: 19  JH-HLM Goal: 6: Walk 10 steps or more  JH-HLM Achieved: 6: Walk 10 steps or more  HLM Goal achieved. Continue to encourage appropriate mobility. Patient Centered Rounds: I performed bedside rounds with nursing staff today. Discussions with Specialists or Other Care Team Provider: nursing and cm    Education and Discussions with Family / Patient: Updated  (daughter) at bedside. Total Time Spent on Date of Encounter in care of patient: This time was spent on one or more of the following: performing physical exam; counseling and coordination of care; obtaining or reviewing history; documenting in the medical record; reviewing/ordering tests, medications or procedures; communicating with other healthcare professionals and discussing with patient's family/caregivers. Current Length of Stay: 3 day(s)  Current Patient Status: Inpatient   Certification Statement: The patient will continue to require additional inpatient hospital stay due to orthostatic hypotension requiring stabilization of blood pressures prior to discharge  Discharge Plan: Anticipate discharge in 24-48 hrs to home with home services. Code Status: Level 3 - DNAR and DNI    Subjective:   Patient states that he is feeling great today. Denies chest pain or shortness of breath.  Does not offer any complaints at this time    Objective:     Vitals:   Temp (24hrs), Av.9 °F (36.6 °C), Min:97.9 °F (36.6 °C), Max:97.9 °F (36.6 °C)    Temp:  [97.9 °F (36.6 °C)] 97.9 °F (36.6 °C)  HR:  [66-89] 66  Resp:  [16-18] 17  BP: (133-175)/(53-76) 150/66  SpO2:  [98 %-99 %] 98 %  Body mass index is 18.92 kg/m². Input and Output Summary (last 24 hours): Intake/Output Summary (Last 24 hours) at 2023 1234  Last data filed at 2023 1128  Gross per 24 hour   Intake 300 ml   Output 1300 ml   Net -1000 ml       Physical Exam:   Physical Exam  Vitals reviewed. Constitutional:       General: He is not in acute distress. Appearance: Normal appearance. He is not ill-appearing. HENT:      Head: Normocephalic and atraumatic. Nose: Nose normal.      Mouth/Throat:      Mouth: Mucous membranes are moist.      Pharynx: Oropharynx is clear. Eyes:      Extraocular Movements: Extraocular movements intact. Conjunctiva/sclera: Conjunctivae normal.   Cardiovascular:      Rate and Rhythm: Normal rate and regular rhythm. Pulses: Normal pulses. Heart sounds: Normal heart sounds. No murmur heard. Pulmonary:      Effort: Pulmonary effort is normal. No respiratory distress. Breath sounds: Normal breath sounds. No wheezing. Abdominal:      General: Abdomen is flat. Bowel sounds are normal. There is no distension. Palpations: Abdomen is soft. Tenderness: There is no abdominal tenderness. There is no guarding. Musculoskeletal:         General: Normal range of motion. Cervical back: Normal range of motion. Right lower leg: No edema. Left lower leg: No edema. Skin:     General: Skin is warm. Neurological:      General: No focal deficit present. Mental Status: He is alert and oriented to person, place, and time. Mental status is at baseline. Motor: No weakness. Psychiatric:         Mood and Affect: Mood normal.         Behavior: Behavior normal.         Thought Content:  Thought content normal.         Judgment: Judgment normal.          Additional Data:     Labs:  Results from last 7 days   Lab Units 12/01/23  0442 11/30/23  0441 11/29/23  1417   WBC Thousand/uL 9.17   < > 13.86*   HEMOGLOBIN g/dL 11.0*   < > 11.1*   HEMATOCRIT % 33.7*   < > 34.2*   PLATELETS Thousands/uL 197   < > 264   NEUTROS PCT %  --   --  77*   LYMPHS PCT %  --   --  13*   MONOS PCT %  --   --  9   EOS PCT %  --   --  0    < > = values in this interval not displayed. Results from last 7 days   Lab Units 12/02/23 0445 11/30/23  0441 11/29/23  1417   SODIUM mmol/L 138   < > 137   POTASSIUM mmol/L 4.3   < > 3.7   CHLORIDE mmol/L 106   < > 104   CO2 mmol/L 28   < > 26   BUN mg/dL 31*   < > 38*   CREATININE mg/dL 1.65*   < > 1.79*   ANION GAP mmol/L 4   < > 7   CALCIUM mg/dL 8.2*   < > 8.4   ALBUMIN g/dL  --   --  3.5   TOTAL BILIRUBIN mg/dL  --   --  0.54   ALK PHOS U/L  --   --  46   ALT U/L  --   --  20   AST U/L  --   --  19   GLUCOSE RANDOM mg/dL 89   < > 105    < > = values in this interval not displayed.                        Lines/Drains:  Invasive Devices       Peripheral Intravenous Line  Duration             Peripheral IV 11/29/23 Right;Upper;Ventral (anterior) Arm 2 days              Drain  Duration             Colostomy LLQ -- days                          Imaging: Reviewed radiology reports from this admission including: chest CT scan    Recent Cultures (last 7 days):         Last 24 Hours Medication List:   Current Facility-Administered Medications   Medication Dose Route Frequency Provider Last Rate    acetaminophen  650 mg Oral Q6H PRN Candis Palma PA-C      amiodarone  100 mg Oral Daily Candis Palma PA-C      apixaban  2.5 mg Oral BID Aris Bustillo PA-C      aspirin  81 mg Oral Daily Candis Palma PA-C      atorvastatin  20 mg Oral Daily Candis Palma PA-C      azelastine  1 spray Each Nare HS Candis Palma PA-C      metoprolol succinate  12.5 mg Oral Daily Aris Bustillo PA-C      montelukast  10 mg Oral HS Candis LIZETH Palma          Today, Patient Was Seen By: Diego Torres PA-C    **Please Note: This note may have been constructed using a voice recognition system. **

## 2023-12-03 VITALS
RESPIRATION RATE: 18 BRPM | WEIGHT: 128.09 LBS | BODY MASS INDEX: 18.92 KG/M2 | DIASTOLIC BLOOD PRESSURE: 62 MMHG | TEMPERATURE: 97.9 F | OXYGEN SATURATION: 97 % | SYSTOLIC BLOOD PRESSURE: 146 MMHG | HEART RATE: 73 BPM

## 2023-12-03 LAB
ANION GAP SERPL CALCULATED.3IONS-SCNC: 4 MMOL/L
BUN SERPL-MCNC: 29 MG/DL (ref 5–25)
CALCIUM SERPL-MCNC: 8.2 MG/DL (ref 8.4–10.2)
CHLORIDE SERPL-SCNC: 106 MMOL/L (ref 96–108)
CO2 SERPL-SCNC: 28 MMOL/L (ref 21–32)
CREAT SERPL-MCNC: 1.62 MG/DL (ref 0.6–1.3)
GFR SERPL CREATININE-BSD FRML MDRD: 36 ML/MIN/1.73SQ M
GLUCOSE SERPL-MCNC: 92 MG/DL (ref 65–140)
POTASSIUM SERPL-SCNC: 4.4 MMOL/L (ref 3.5–5.3)
SODIUM SERPL-SCNC: 138 MMOL/L (ref 135–147)

## 2023-12-03 PROCEDURE — 80048 BASIC METABOLIC PNL TOTAL CA: CPT

## 2023-12-03 PROCEDURE — 99239 HOSP IP/OBS DSCHRG MGMT >30: CPT

## 2023-12-03 RX ORDER — METOPROLOL SUCCINATE 25 MG/1
12.5 TABLET, EXTENDED RELEASE ORAL
Qty: 15 TABLET | Refills: 0 | Status: SHIPPED | OUTPATIENT
Start: 2023-12-03 | End: 2024-01-02

## 2023-12-03 RX ORDER — METOPROLOL SUCCINATE 25 MG/1
12.5 TABLET, EXTENDED RELEASE ORAL DAILY
Qty: 15 TABLET | Refills: 0 | Status: SHIPPED | OUTPATIENT
Start: 2023-12-04 | End: 2023-12-03 | Stop reason: SDUPTHER

## 2023-12-03 RX ADMIN — APIXABAN 2.5 MG: 2.5 TABLET, FILM COATED ORAL at 17:33

## 2023-12-03 RX ADMIN — METOPROLOL SUCCINATE 12.5 MG: 25 TABLET, EXTENDED RELEASE ORAL at 09:39

## 2023-12-03 RX ADMIN — ATORVASTATIN CALCIUM 20 MG: 20 TABLET, FILM COATED ORAL at 09:33

## 2023-12-03 RX ADMIN — APIXABAN 2.5 MG: 2.5 TABLET, FILM COATED ORAL at 09:34

## 2023-12-03 RX ADMIN — ASPIRIN 81 MG: 81 TABLET, COATED ORAL at 09:33

## 2023-12-03 RX ADMIN — AMIODARONE HYDROCHLORIDE 100 MG: 200 TABLET ORAL at 09:33

## 2023-12-03 NOTE — ASSESSMENT & PLAN NOTE
Recently had increase in metoprolol to 50mg every 12 hours which resulted in syncopal episode and hypotension  Was taking metoprolol 25 mg daily prior to hospitalization on 11/16, as well as digoxin every other day and was started on amiodarone daily. Daughter unsure as to why patient is not on anticoagulation  Cardiology consulted -stop digoxin and plavix. Resume metoprolol at 12.5 mg nightly. Continue amiodarone and aspirin. Start Eliquis 2.5 twice daily  EKG on admission with sinus bradycardia.  Continue telemetry

## 2023-12-03 NOTE — ASSESSMENT & PLAN NOTE
Had recent NSTEMI 11/16 at [facility] and was treated with heparin drip  Continue aspirin, statin. Resumed metoprolol at 12.5 nightly. Hold plavix.   Started on low-dose Eliquis 2.5 mg twice daily  EKG with stable/chronic ischemic changes

## 2023-12-03 NOTE — ASSESSMENT & PLAN NOTE
Syncopal episode at home and was caught by daughter. Patient unable to recall events prior to syncopal episode. Family also reporting bradycardia and hypotension and at home  Likely secondary to overmedication with metoprolol causing bradycardia and possibly hypotension  Positive Orthostatic vital signs - apply pepito stockings.  Orthostatics negative at time of admission  Telemetry  Gentle IVF has been stopped  Cardiology consulted, plans under a fib

## 2023-12-03 NOTE — DISCHARGE INSTR - AVS FIRST PAGE
Continue eliquis 2.5mg two times daily, aspirin daily, amiodarone 100mg daily, and metoprolol 12.5mg nightly. Stop taking plavix. Follow up with cardiology outpatient. Continue wearing KAYLEY stockings during the day to help with your blood pressure.  If symptoms worsen or new symptoms arise, come back to the hospital.

## 2023-12-03 NOTE — ASSESSMENT & PLAN NOTE
Hyzaar recently discontinued due to poor kidney function  Orthostatics positive with 75 point systolic drop, improving  Continue home pepito stockings  Continue orthostatic vital signs - negative at time of discharge

## 2023-12-03 NOTE — NURSING NOTE
With permission of Pt, remotely reviewed AVS with the Pt and his family.  Shared successful completion with the bedside RN

## 2023-12-03 NOTE — DISCHARGE SUMMARY
[address] [facility]  Discharge- Tereza Osei. 2/1/1930, 80 y.o. male MRN: [id number]  Unit/Bed#: -01 Encounter: [id number]  Primary Care Provider: Sophia Gaona MD   Date and time admitted to hospital: 11/29/2023  1:35 PM    * Vasovagal syncope  Assessment & Plan  Syncopal episode at home and was caught by daughter. Patient unable to recall events prior to syncopal episode. Family also reporting bradycardia and hypotension and at home  Likely secondary to overmedication with metoprolol causing bradycardia and possibly hypotension  Positive Orthostatic vital signs - apply pepito stockings. Orthostatics negative at time of admission  Telemetry  Gentle IVF has been stopped  Cardiology consulted, plans under a fib    Paroxysmal atrial fibrillation [facility])  Assessment & Plan  Recently had increase in metoprolol to 50mg every 12 hours which resulted in syncopal episode and hypotension  Was taking metoprolol 25 mg daily prior to hospitalization on 11/16, as well as digoxin every other day and was started on amiodarone daily. Daughter unsure as to why patient is not on anticoagulation  Cardiology consulted -stop digoxin and plavix. Resume metoprolol at 12.5 mg nightly. Continue amiodarone and aspirin. Start Eliquis 2.5 twice daily  EKG on admission with sinus bradycardia. Continue telemetry    H/O non-ST elevation myocardial infarction (NSTEMI)  Assessment & Plan  Had recent NSTEMI 11/16 at [facility] and was treated with heparin drip  Continue aspirin, statin. Resumed metoprolol at 12.5 nightly. Hold plavix.   Started on low-dose Eliquis 2.5 mg twice daily  EKG with stable/chronic ischemic changes    Chronic renal failure, stage 3a [facility])  Assessment & Plan  Lab Results   Component Value Date    EGFR 36 12/03/2023    EGFR 35 12/02/2023    EGFR 36 12/01/2023    CREATININE 1.62 (H) 12/03/2023    CREATININE 1.65 (H) 12/02/2023    CREATININE 1.59 (H) 12/01/2023   Baseline 1. 4-1.6, occasionally 1.7  Will give gentle IVF due to dehydration - has since been discontinued  Avoid nephrotoxic agents and hypotension    Rectal carcinoma ([address])  Assessment & Plan  With colostomy present. Continue outpatient follow-up    Orthostatic hypotension  Assessment & Plan  Hyzaar recently discontinued due to poor kidney function  Orthostatics positive with 75 point systolic drop, improving  Continue home pepito stockings  Continue orthostatic vital signs - negative at time of discharge      Medical Problems       Resolved Problems  Date Reviewed: 12/3/2023   None       Discharging Physician / Practitioner: Kelly Gr PA-C  PCP: Alesha Rockwell MD  Admission Date:   Admission Orders (From admission, onward)       Ordered        11/29/23 1525  INPATIENT ADMISSION  Once                          Discharge Date: 12/03/23    Consultations During Hospital Stay:  cardiology    Procedures Performed:   none    Significant Findings / Test Results:   Chest x-ray showing evidence of chronic scarring and pleural plaques. No obvious infiltrate. Possible tiny apical pneumothorax on the left. Follow-up with inspiratory expiratory views or CT could be utilized  CT chest showing no pneumothorax. Appearance on prior chest radiograph attributable to apical pleural-parenchymal scarring and minimal emphysematous change. No acute findings in the chest    Incidental Findings:   none     Test Results Pending at Discharge (will require follow up):   none     Outpatient Tests Requested:  none    Complications:  none    Reason for Admission: vasovagal syncope    Hospital Course:   Lael Schwab. is a 80 y.o. male patient who originally presented to the hospital on 11/29/2023 due to vasovagal syncope secondary to bradycardia and hypotension. Patient's cardiac medications were changed. Patient was started on Eliquis 2.5 mg twice daily and metoprolol was changed to 12.5 nightly.   Digoxin and Plavix were discontinued. Patient is to continue amiodarone as previously prescribed. Patient is to continue using KAYLEY stockings as was orthostatic on admission. Patient is to follow-up with cardiology on discharge and is preferring to follow-up with [place] [facility]. Please see above list of diagnoses and related plan for additional information. Condition at Discharge: good    Discharge Day Visit / Exam:   Subjective: Patient states that he is feeling well today. Denies chest pain, shortness of breath or dizziness. Patient is eager to get home at this time. Vitals: Blood Pressure: 146/62 (12/03/23 1401)  Pulse: 73 (12/03/23 1247)  Temperature: 97.9 °F (36.6 °C) (12/03/23 1401)  Temp Source: Temporal (12/02/23 2120)  Respirations: 18 (12/03/23 1401)  Weight - Scale: 58.1 kg (128 lb 1.4 oz) (11/29/23 1341)  SpO2: 97 % (12/03/23 1247)  Exam:   Physical Exam  Vitals reviewed. Constitutional:       General: He is not in acute distress. Appearance: Normal appearance. He is not ill-appearing. HENT:      Head: Normocephalic and atraumatic. Nose: Nose normal.      Mouth/Throat:      Mouth: Mucous membranes are moist.      Pharynx: Oropharynx is clear. Eyes:      Extraocular Movements: Extraocular movements intact. Conjunctiva/sclera: Conjunctivae normal.   Cardiovascular:      Rate and Rhythm: Normal rate and regular rhythm. Pulses: Normal pulses. Heart sounds: Normal heart sounds. No murmur heard. Pulmonary:      Effort: Pulmonary effort is normal. No respiratory distress. Breath sounds: Normal breath sounds. No wheezing. Abdominal:      General: Abdomen is flat. Bowel sounds are normal. There is no distension. Palpations: Abdomen is soft. Tenderness: There is no abdominal tenderness. There is no guarding. Musculoskeletal:         General: Normal range of motion. Cervical back: Normal range of motion. Right lower leg: No edema. Left lower leg: No edema. Skin:     General: Skin is warm. Neurological:      General: No focal deficit present. Mental Status: He is alert and oriented to person, place, and time. Mental status is at baseline. Motor: No weakness. Psychiatric:         Mood and Affect: Mood normal.         Behavior: Behavior normal.         Thought Content: Thought content normal.         Judgment: Judgment normal.          Discussion with Family: Updated  (daughter) at bedside. Discharge instructions/Information to patient and family:   See after visit summary for information provided to patient and family. Provisions for Follow-Up Care:  See after visit summary for information related to follow-up care and any pertinent home health orders. Mobility at time of Discharge:   Basic Mobility Inpatient Raw Score: 19  JH-HLM Goal: 6: Walk 10 steps or more  JH-HLM Achieved: 6: Walk 10 steps or more  HLM Goal achieved. Continue to encourage appropriate mobility. Disposition:   Home with VNA Services (Reminder: Complete face to face encounter)    Planned Readmission: None     Discharge Statement:  I spent 45 minutes discharging the patient. This time was spent on the day of discharge. I had direct contact with the patient on the day of discharge. Greater than 50% of the total time was spent examining patient, answering all patient questions, arranging and discussing plan of care with patient as well as directly providing post-discharge instructions. Additional time then spent on discharge activities. Discharge Medications:  See after visit summary for reconciled discharge medications provided to patient and/or family.       **Please Note: This note may have been constructed using a voice recognition system**

## 2023-12-03 NOTE — PLAN OF CARE
Problem: PAIN - ADULT  Goal: Verbalizes/displays adequate comfort level or baseline comfort level  Description: Interventions:  - Encourage patient to monitor pain and request assistance  - Assess pain using appropriate pain scale  - Administer analgesics based on type and severity of pain and evaluate response  - Implement non-pharmacological measures as appropriate and evaluate response  - Consider cultural and social influences on pain and pain management  - Notify physician/advanced practitioner if interventions unsuccessful or patient reports new pain  Outcome: Progressing     Problem: INFECTION - ADULT  Goal: Absence or prevention of progression during hospitalization  Description: INTERVENTIONS:  - Assess and monitor for signs and symptoms of infection  - Monitor lab/diagnostic results  - Monitor all insertion sites, i.e. indwelling lines, tubes, and drains  - Monitor endotracheal if appropriate and nasal secretions for changes in amount and color  - [place] appropriate cooling/warming therapies per order  - Administer medications as ordered  - Instruct and encourage patient and family to use good hand hygiene technique  - Identify and instruct in appropriate isolation precautions for identified infection/condition  Outcome: Progressing     Problem: SAFETY ADULT  Goal: Patient will remain free of falls  Description: INTERVENTIONS:  - Educate patient/family on patient safety including physical limitations  - Instruct patient to call for assistance with activity   - Consult OT/PT to assist with strengthening/mobility   - Keep Call bell within reach  - Keep bed low and locked with side rails adjusted as appropriate  - Keep care items and personal belongings within reach  - Initiate and maintain comfort rounds  - Make Fall Risk Sign visible to staff  - Offer Toileting every 2 Hours, in advance of need  - Initiate/Maintain bed/chair alarm  - Obtain necessary fall risk management equipment: bed/chair alarm  - Apply yellow socks and bracelet for high fall risk patients  - Consider moving patient to room near nurses station  Outcome: Progressing  Goal: Maintain or return to baseline ADL function  Description: INTERVENTIONS:  -  Assess patient's ability to carry out ADLs; assess patient's baseline for ADL function and identify physical deficits which impact ability to perform ADLs (bathing, care of mouth/teeth, toileting, grooming, dressing, etc.)  - Assess/evaluate cause of self-care deficits   - Assess range of motion  - Assess patient's mobility; develop plan if impaired  - Assess patient's need for assistive devices and provide as appropriate  - Encourage maximum independence but intervene and supervise when necessary  - Involve family in performance of ADLs  - Assess for home care needs following discharge   - Consider OT consult to assist with ADL evaluation and planning for discharge  - Provide patient education as appropriate  Outcome: Progressing  Goal: Maintains/Returns to pre admission functional level  Description: INTERVENTIONS:  - Perform AM-PAC 6 Click Basic Mobility/ Daily Activity assessment daily.  - Set and communicate daily mobility goal to care team and patient/family/caregiver. - Collaborate with rehabilitation services on mobility goals if consulted  - Perform Range of Motion 3 times a day. - Reposition patient every 2 hours.   - Dangle patient 3 times a day  - Stand patient 3 times a day  - Ambulate patient 3 times a day  - Out of bed to chair 3 times a day   - Out of bed for meals 3 times a day  - Out of bed for toileting  - Record patient progress and toleration of activity level   Outcome: Progressing     Problem: DISCHARGE PLANNING  Goal: Discharge to home or other facility with appropriate resources  Description: INTERVENTIONS:  - Identify barriers to discharge w/patient and caregiver  - Arrange for needed discharge resources and transportation as appropriate  - Identify discharge learning needs (meds, wound care, etc.)  - Arrange for interpretive services to assist at discharge as needed  - Refer to Case Management Department for coordinating discharge planning if the patient needs post-hospital services based on physician/advanced practitioner order or complex needs related to functional status, cognitive ability, or social support system  Outcome: Progressing     Problem: Knowledge Deficit  Goal: Patient/family/caregiver demonstrates understanding of disease process, treatment plan, medications, and discharge instructions  Description: Complete learning assessment and assess knowledge base.   Interventions:  - Provide teaching at level of understanding  - Provide teaching via preferred learning methods  Outcome: Progressing

## 2023-12-03 NOTE — CASE MANAGEMENT
Case Management Discharge Planning Note    Patient name Susana Allen.   Location /-01 MRN [id number]  : 1930 Date 12/3/2023       Current Admission Date: 2023  Current Admission Diagnosis:Vasovagal syncope   Patient Active Problem List    Diagnosis Date Noted    Vasovagal syncope 2023    H/O non-ST elevation myocardial infarction (NSTEMI) 2023    Chronic renal failure, stage 3a ([address]) 06/15/2021    Mild protein-calorie malnutrition ([address]) 2021    Orthostatic hypotension 2019    Paroxysmal atrial fibrillation ([address]) 2019    Rectal carcinoma ([address]) 2019    Colostomy present ([address]) 2019    Non-seasonal allergic rhinitis 2019    Mixed stress and urge urinary incontinence 2019      LOS (days): 4  Geometric Mean LOS (GMLOS) (days): 2.40  Days to GMLOS:-1.7     OBJECTIVE:  Risk of Unplanned Readmission Score: 19.04         Current admission status: Inpatient   Preferred Pharmacy:   [address] [facility], [address] [facility] [address]  Phone: [phone] Fax: [phone] [address], [address] [facility] [address]  Phone: [phone] Fax: [phone], [address] [facility]  [address] [facility] [address]  Phone: [phone] Fax: [address] [facility] [address] [facility], [address] [address]  Phone: [phone] Fax: [address] [facility] [address] [facility]  [address] [place] [address]  Phone: [phone] Fax: [phone]    [id number] [place], [place] - [facility] [address]  [address] [address]  Phone: [phone] Fax: [phone]    Primary Care Provider: Laura Oquendo MD    Primary Insurance: MEDICARE  Secondary Insurance: AARP    DISCHARGE DETAILS:    [facility] is set up for dc, AVS was sent to them. Daughter is aware transport WC was ordered Friday and await to be processed on Monday. Cm provided daughter information on [employer] in case [facility] is not covered by his insurance.

## 2023-12-03 NOTE — PLAN OF CARE
Problem: PAIN - ADULT  Goal: Verbalizes/displays adequate comfort level or baseline comfort level  Description: Interventions:  - Encourage patient to monitor pain and request assistance  - Assess pain using appropriate pain scale  - Administer analgesics based on type and severity of pain and evaluate response  - Implement non-pharmacological measures as appropriate and evaluate response  - Consider cultural and social influences on pain and pain management  - Notify physician/advanced practitioner if interventions unsuccessful or patient reports new pain  Outcome: Adequate for Discharge     Problem: INFECTION - ADULT  Goal: Absence or prevention of progression during hospitalization  Description: INTERVENTIONS:  - Assess and monitor for signs and symptoms of infection  - Monitor lab/diagnostic results  - Monitor all insertion sites, i.e. indwelling lines, tubes, and drains  - Monitor endotracheal if appropriate and nasal secretions for changes in amount and color  - [place] appropriate cooling/warming therapies per order  - Administer medications as ordered  - Instruct and encourage patient and family to use good hand hygiene technique  - Identify and instruct in appropriate isolation precautions for identified infection/condition  Outcome: Adequate for Discharge     Problem: SAFETY ADULT  Goal: Patient will remain free of falls  Description: INTERVENTIONS:  - Educate patient/family on patient safety including physical limitations  - Instruct patient to call for assistance with activity   - Consult OT/PT to assist with strengthening/mobility   - Keep Call bell within reach  - Keep bed low and locked with side rails adjusted as appropriate  - Keep care items and personal belongings within reach  - Initiate and maintain comfort rounds  - Make Fall Risk Sign visible to staff    Problem: DISCHARGE PLANNING  Goal: Discharge to home or other facility with appropriate resources  Description: INTERVENTIONS:  - Identify barriers to discharge w/patient and caregiver  - Arrange for needed discharge resources and transportation as appropriate  - Identify discharge learning needs (meds, wound care, etc.)  - Arrange for interpretive services to assist at discharge as needed  - Refer to Case Management Department for coordinating discharge planning if the patient needs post-hospital services based on physician/advanced practitioner order or complex needs related to functional status, cognitive ability, or social support system  Outcome: Adequate for Discharge     Problem: Knowledge Deficit  Goal: Patient/family/caregiver demonstrates understanding of disease process, treatment plan, medications, and discharge instructions  Description: Complete learning assessment and assess knowledge base.   Interventions:  - Provide teaching at level of understanding  - Provide teaching via preferred learning methods  Outcome: Adequate for Discharge     - Apply yellow socks and bracelet for high fall risk patients  - Consider moving patient to room near nurses station  Outcome: Adequate for Discharge  Goal: Maintain or return to baseline ADL function  Description: INTERVENTIONS:  -  Assess patient's ability to carry out ADLs; assess patient's baseline for ADL function and identify physical deficits which impact ability to perform ADLs (bathing, care of mouth/teeth, toileting, grooming, dressing, etc.)  - Assess/evaluate cause of self-care deficits   - Assess range of motion  - Assess patient's mobility; develop plan if impaired  - Assess patient's need for assistive devices and provide as appropriate  - Encourage maximum independence but intervene and supervise when necessary  - Involve family in performance of ADLs  - Assess for home care needs following discharge   - Consider OT consult to assist with ADL evaluation and planning for discharge  - Provide patient education as appropriate  Outcome: Adequate for Discharge  Goal: Maintains/Returns to pre admission functional level  Description: INTERVENTIONS:  - Perform AM-PAC 6 Click Basic Mobility/ Daily Activity assessment daily.  - Set and communicate daily mobility goal to care team and patient/family/caregiver.    - Collaborate with rehabilitation services on mobility goals if consulted    - Out of bed for toileting  - Record patient progress and toleration of activity level   Outcome: Adequate for Discharge

## 2023-12-03 NOTE — ASSESSMENT & PLAN NOTE
Lab Results   Component Value Date    EGFR 36 12/03/2023    EGFR 35 12/02/2023    EGFR 36 12/01/2023    CREATININE 1.62 (H) 12/03/2023    CREATININE 1.65 (H) 12/02/2023    CREATININE 1.59 (H) 12/01/2023   Baseline 1.4-1.6, occasionally 1.7  Will give gentle IVF due to dehydration - has since been discontinued  Avoid nephrotoxic agents and hypotension

## 2023-12-04 ENCOUNTER — TRANSITIONAL CARE MANAGEMENT (OUTPATIENT)
Dept: FAMILY MEDICINE CLINIC | Facility: CLINIC | Age: 88
End: 2023-12-04

## 2023-12-04 LAB
DME PARACHUTE DELIVERY DATE REQUESTED: NORMAL
DME PARACHUTE ITEM DESCRIPTION: NORMAL
DME PARACHUTE ITEM DESCRIPTION: NORMAL
DME PARACHUTE ORDER STATUS: NORMAL
DME PARACHUTE SUPPLIER NAME: NORMAL
DME PARACHUTE SUPPLIER PHONE: NORMAL

## 2023-12-05 ENCOUNTER — OFFICE VISIT (OUTPATIENT)
Dept: FAMILY MEDICINE CLINIC | Facility: CLINIC | Age: 88
End: 2023-12-05
Payer: MEDICARE

## 2023-12-05 VITALS
DIASTOLIC BLOOD PRESSURE: 66 MMHG | SYSTOLIC BLOOD PRESSURE: 136 MMHG | HEART RATE: 69 BPM | WEIGHT: 126 LBS | BODY MASS INDEX: 18.66 KG/M2 | OXYGEN SATURATION: 99 % | HEIGHT: 69 IN

## 2023-12-05 DIAGNOSIS — E44.1 MILD PROTEIN-CALORIE MALNUTRITION (HCC): ICD-10-CM

## 2023-12-05 DIAGNOSIS — I95.1 ORTHOSTATIC HYPOTENSION: Primary | ICD-10-CM

## 2023-12-05 DIAGNOSIS — I48.0 PAROXYSMAL ATRIAL FIBRILLATION (HCC): Chronic | ICD-10-CM

## 2023-12-05 PROCEDURE — 99496 TRANSJ CARE MGMT HIGH F2F 7D: CPT | Performed by: FAMILY MEDICINE

## 2023-12-05 RX ORDER — OSTOMY SUPPLY 1 3/4"
EACH MISCELLANEOUS
COMMUNITY
Start: 2023-10-19

## 2023-12-05 RX ORDER — METOPROLOL TARTRATE 50 MG/1
50 TABLET, FILM COATED ORAL EVERY 12 HOURS
COMMUNITY
Start: 2023-11-21

## 2023-12-05 RX ORDER — DIPHENOXYLATE HYDROCHLORIDE AND ATROPINE SULFATE 2.5; .025 MG/1; MG/1
1 TABLET ORAL DAILY
COMMUNITY

## 2023-12-05 RX ORDER — PREDNISONE 20 MG/1
40 TABLET ORAL DAILY
COMMUNITY
Start: 2023-11-22

## 2023-12-05 RX ORDER — NITROGLYCERIN 0.4 MG/1
1 TABLET SUBLINGUAL
COMMUNITY
Start: 2023-11-21 | End: 2024-11-20

## 2023-12-05 NOTE — PROGRESS NOTES
Assessment & Plan     1. Orthostatic hypotension  Assessment & Plan:  Seems to be doing much better at this time. Reviewed hospital reports and reconciled medication. With standing today blood pressure appears stable. May use walking stick at home but I do not feel needs a wheelchair or a walker and should push daily walking activity      2. Paroxysmal atrial fibrillation (HCC)  Assessment & Plan:  Appears in sinus rhythm at this time and is on amiodarone. Will continue current regimen. Is due for follow-up with cardiology in January      3. Mild protein-calorie malnutrition ([address])  Assessment & Plan:  I have malnutrition Findings:       Appetite seems good. Push nutrition as tolerated                          BMI Findings: Body mass index is 18.61 kg/m². Subjective     Transitional Care Management Review:   Minor Cooper. is a 80 y.o. male here for TCM follow up. During the TCM phone call patient stated:  TCM Call       Type Value    Date and time call was made  12/4/2023  9:25 AM    Hospital care reviewed  Records reviewed    Patient was hospitialized at  [address] [place] [address]    Date of Admission  11/29/23    Date of discharge  12/03/23    Diagnosis  Vasovagal syncope    Disposition  Home    Were the patients medications reviewed and updated  No    Current Symptoms  None          TCM Call       Type Value    Post hospital issues  None    Should patient be enrolled in anticoag monitoring? No    Scheduled for follow up?   Yes    Did you obtain your prescribed medications  Yes    Do you need help managing your prescriptions or medications  No    Is transportation to your appointment needed  Yes    I have advised the patient to call PCP with any new or worsening symptoms  Gill Ferrari, [address]    Are you recieving any outpatient services  No    Are you recieving home care services  Yes    Types of home care services  Home PT    Are you using any community resources  No    Current waiver services  No    Have you fallen in the last 12 months  No    Interperter language line needed  No    Counseling  Patient            Patient has history of hypertension, rectal carcinoma, functional colostomy, allergic rhinitis, paroxysmal atrial fibrillation and urinary incontinence had been hospitalized for paroxysmal atrial fibrillation with lightheadedness. Was placed on Metroprolol 50 mg twice a day as well as Plavix and digoxin every other day instead of daily. Was hypotensive and went back to the hospital.  The metoprolol was decreased to 12.5 mg at bedtime. Plavix was discontinued and Eliquis 2.5 mg was started digoxin was discontinued was sent home and does have wheelchair ordered although has been walking around the house and the steps well and feels good at this time      Review of Systems   Constitutional:  Negative for activity change, appetite change and fatigue. HENT:  Negative for congestion. Eyes:  Negative for visual disturbance. Respiratory:  Negative for cough, chest tightness and shortness of breath. Gastrointestinal:  Negative for abdominal pain, constipation and diarrhea. Endocrine: Negative for polyuria. Genitourinary:  Negative for difficulty urinating. Neurological:  Negative for dizziness and light-headedness. Psychiatric/Behavioral:  Negative for sleep disturbance. Objective     Blood Pressure 136/66 (BP Location: Left arm, Patient Position: Sitting, Cuff Size: Standard)   Pulse 69   Height 5' 9" (1.753 m)   Weight 57.2 kg (126 lb)   Oxygen Saturation 99%   Body Mass Index 18.61 kg/m²      Physical Exam  Vitals and nursing note reviewed. Constitutional:       Appearance: Normal appearance. HENT:      Head: Normocephalic. Nose: Nose normal.      Mouth/Throat:      Mouth: Mucous membranes are moist.   Eyes:      Extraocular Movements: Extraocular movements intact. Conjunctiva/sclera: Conjunctivae normal.      Pupils: Pupils are equal, round, and reactive to light. Neck:      Vascular: No carotid bruit. Cardiovascular:      Rate and Rhythm: Normal rate and regular rhythm. Pulses: Normal pulses. Heart sounds: Murmur (Soft systolic murmur at left sternal border. Heart rate is 78. Blood pressure upon standing was 132/60) heard. Pulmonary:      Effort: Pulmonary effort is normal.      Breath sounds: Normal breath sounds. Abdominal:      General: Abdomen is flat. There is no distension. Palpations: Abdomen is soft. There is no mass. Tenderness: There is no abdominal tenderness. Musculoskeletal:         General: No swelling. Cervical back: Normal range of motion and neck supple. No muscular tenderness. Right lower leg: No edema. Left lower leg: No edema. Lymphadenopathy:      Cervical: No cervical adenopathy. Skin:     General: Skin is warm and dry. Findings: No rash. Neurological:      General: No focal deficit present. Mental Status: He is alert and oriented to person, place, and time. Sensory: Sensory deficit present. Motor: No weakness.       Coordination: Coordination normal.      Gait: Gait normal.      Deep Tendon Reflexes: Reflexes normal.   Psychiatric:         Mood and Affect: Mood normal.       Medications have been reviewed by provider in current encounter    Lesli Hyatt MD

## 2023-12-05 NOTE — ASSESSMENT & PLAN NOTE
Seems to be doing much better at this time. Reviewed hospital reports and reconciled medication. With standing today blood pressure appears stable.   May use walking stick at home but I do not feel needs a wheelchair or a walker and should push daily walking activity

## 2023-12-05 NOTE — ASSESSMENT & PLAN NOTE
Appears in sinus rhythm at this time and is on amiodarone. Will continue current regimen.   Is due for follow-up with cardiology in January

## 2023-12-05 NOTE — ASSESSMENT & PLAN NOTE
I have malnutrition Findings:       Appetite seems good. Push nutrition as tolerated                          BMI Findings: Body mass index is 18.61 kg/m².

## 2023-12-05 NOTE — PATIENT INSTRUCTIONS
Overall seems to be doing very well at home at this time and should continue to push the walking activity. May consider carrying the cane or walking stick as does not seem to need the walker.   Continue current meds as is

## 2023-12-27 DIAGNOSIS — I48.0 PAROXYSMAL ATRIAL FIBRILLATION (HCC): ICD-10-CM

## 2023-12-27 RX ORDER — METOPROLOL SUCCINATE 25 MG/1
12.5 TABLET, EXTENDED RELEASE ORAL
Qty: 30 TABLET | Refills: 0 | Status: SHIPPED | OUTPATIENT
Start: 2023-12-27 | End: 2024-02-25

## 2024-01-04 ENCOUNTER — OFFICE VISIT (OUTPATIENT)
Dept: FAMILY MEDICINE CLINIC | Facility: CLINIC | Age: 89
End: 2024-01-04
Payer: MEDICARE

## 2024-01-04 VITALS
HEIGHT: 69 IN | BODY MASS INDEX: 18.66 KG/M2 | WEIGHT: 126 LBS | DIASTOLIC BLOOD PRESSURE: 64 MMHG | SYSTOLIC BLOOD PRESSURE: 138 MMHG

## 2024-01-04 DIAGNOSIS — Z00.00 MEDICARE ANNUAL WELLNESS VISIT, SUBSEQUENT: ICD-10-CM

## 2024-01-04 DIAGNOSIS — J30.89 NON-SEASONAL ALLERGIC RHINITIS, UNSPECIFIED TRIGGER: Chronic | ICD-10-CM

## 2024-01-04 DIAGNOSIS — Z93.3 COLOSTOMY PRESENT (HCC): Chronic | ICD-10-CM

## 2024-01-04 DIAGNOSIS — C20 RECTAL CARCINOMA (HCC): Chronic | ICD-10-CM

## 2024-01-04 DIAGNOSIS — E44.1 MILD PROTEIN-CALORIE MALNUTRITION (HCC): ICD-10-CM

## 2024-01-04 DIAGNOSIS — I48.0 PAROXYSMAL ATRIAL FIBRILLATION (HCC): Primary | Chronic | ICD-10-CM

## 2024-01-04 PROCEDURE — 99214 OFFICE O/P EST MOD 30 MIN: CPT | Performed by: FAMILY MEDICINE

## 2024-01-04 PROCEDURE — G0444 DEPRESSION SCREEN ANNUAL: HCPCS | Performed by: FAMILY MEDICINE

## 2024-01-04 PROCEDURE — G0439 PPPS, SUBSEQ VISIT: HCPCS | Performed by: FAMILY MEDICINE

## 2024-01-04 NOTE — PATIENT INSTRUCTIONS
Medicare Preventive Visit Patient Instructions  Thank you for completing your Welcome to Medicare Visit or Medicare Annual Wellness Visit today. Your next wellness visit will be due in one year (1/4/2025).  The screening/preventive services that you may require over the next 5-10 years are detailed below. Some tests may not apply to you based off risk factors and/or age. Screening tests ordered at today's visit but not completed yet may show as past due. Also, please note that scanned in results may not display below.  Preventive Screenings:  Service Recommendations Previous Testing/Comments   Colorectal Cancer Screening  Colonoscopy    Fecal Occult Blood Test (FOBT)/Fecal Immunochemical Test (FIT)  Fecal DNA/Cologuard Test  Flexible Sigmoidoscopy Age: 45-75 years old   Colonoscopy: every 10 years (May be performed more frequently if at higher risk)  OR  FOBT/FIT: every 1 year  OR  Cologuard: every 3 years  OR  Sigmoidoscopy: every 5 years  Screening may be recommended earlier than age 45 if at higher risk for colorectal cancer. Also, an individualized decision between you and your healthcare provider will decide whether screening between the ages of 76-85 would be appropriate. Colonoscopy: 06/12/2013  FOBT/FIT: Not on file  Cologuard: Not on file  Sigmoidoscopy: Not on file    Screening Not Indicated  History Colorectal Cancer     Prostate Cancer Screening Individualized decision between patient and health care provider in men between ages of 55-69   Medicare will cover every 12 months beginning on the day after your 50th birthday PSA: No results in last 5 years     Screening Not Indicated     Hepatitis C Screening Once for adults born between 1945 and 1965  More frequently in patients at high risk for Hepatitis C Hep C Antibody: Not on file        Diabetes Screening 1-2 times per year if you're at risk for diabetes or have pre-diabetes Fasting glucose: 99 mg/dL (1/29/2020)  A1C: No results in last 5 years (No  results in last 5 years)  Screening Current   Cholesterol Screening Once every 5 years if you don't have a lipid disorder. May order more often based on risk factors. Lipid panel: 11/20/2023  Screening Current      Other Preventive Screenings Covered by Medicare:  Abdominal Aortic Aneurysm (AAA) Screening: covered once if your at risk. You're considered to be at risk if you have a family history of AAA or a male between the age of 65-75 who smoking at least 100 cigarettes in your lifetime.  Lung Cancer Screening: covers low dose CT scan once per year if you meet all of the following conditions: (1) Age 55-77; (2) No signs or symptoms of lung cancer; (3) Current smoker or have quit smoking within the last 15 years; (4) You have a tobacco smoking history of at least 20 pack years (packs per day x number of years you smoked); (5) You get a written order from a healthcare provider.  Glaucoma Screening: covered annually if you're considered high risk: (1) You have diabetes OR (2) Family history of glaucoma OR (3)  aged 50 and older OR (4)  American aged 65 and older  Osteoporosis Screening: covered every 2 years if you meet one of the following conditions: (1) Have a vertebral abnormality; (2) On glucocorticoid therapy for more than 3 months; (3) Have primary hyperparathyroidism; (4) On osteoporosis medications and need to assess response to drug therapy.  HIV Screening: covered annually if you're between the age of 15-65. Also covered annually if you are younger than 15 and older than 65 with risk factors for HIV infection. For pregnant patients, it is covered up to 3 times per pregnancy.    Immunizations:  Immunization Recommendations   Influenza Vaccine Annual influenza vaccination during flu season is recommended for all persons aged >= 6 months who do not have contraindications   Pneumococcal Vaccine   * Pneumococcal conjugate vaccine = PCV13 (Prevnar 13), PCV15 (Vaxneuvance), PCV20 (Prevnar  20)  * Pneumococcal polysaccharide vaccine = PPSV23 (Pneumovax) Adults 19-63 yo with certain risk factors or if 65+ yo  If never received any pneumonia vaccine: recommend Prevnar 20 (PCV20)  Give PCV20 if previously received 1 dose of PCV13 or PPSV23   Hepatitis B Vaccine 3 dose series if at intermediate or high risk (ex: diabetes, end stage renal disease, liver disease)   Respiratory syncytial virus (RSV) Vaccine - COVERED BY MEDICARE PART D  * RSVPreF3 (Arexvy) CDC recommends that adults 60 years of age and older may receive a single dose of RSV vaccine using shared clinical decision-making (SCDM)   Tetanus (Td) Vaccine - COST NOT COVERED BY MEDICARE PART B Following completion of primary series, a booster dose should be given every 10 years to maintain immunity against tetanus. Td may also be given as tetanus wound prophylaxis.   Tdap Vaccine - COST NOT COVERED BY MEDICARE PART B Recommended at least once for all adults. For pregnant patients, recommended with each pregnancy.   Shingles Vaccine (Shingrix) - COST NOT COVERED BY MEDICARE PART B  2 shot series recommended in those 19 years and older who have or will have weakened immune systems or those 50 years and older     Health Maintenance Due:  There are no preventive care reminders to display for this patient.  Immunizations Due:      Topic Date Due    COVID-19 Vaccine (1) Never done    Pneumococcal Vaccine: 65+ Years (2 - PCV) 08/20/2015     Advance Directives   What are advance directives?  Advance directives are legal documents that state your wishes and plans for medical care. These plans are made ahead of time in case you lose your ability to make decisions for yourself. Advance directives can apply to any medical decision, such as the treatments you want, and if you want to donate organs.   What are the types of advance directives?  There are many types of advance directives, and each state has rules about how to use them. You may choose a combination  of any of the following:  Living will:  This is a written record of the treatment you want. You can also choose which treatments you do not want, which to limit, and which to stop at a certain time. This includes surgery, medicine, IV fluid, and tube feedings.   Durable power of  for healthcare (DPAHC):  This is a written record that states who you want to make healthcare choices for you when you are unable to make them for yourself. This person, called a proxy, is usually a family member or a friend. You may choose more than 1 proxy.  Do not resuscitate (DNR) order:  A DNR order is used in case your heart stops beating or you stop breathing. It is a request not to have certain forms of treatment, such as CPR. A DNR order may be included in other types of advance directives.  Medical directive:  This covers the care that you want if you are in a coma, near death, or unable to make decisions for yourself. You can list the treatments you want for each condition. Treatment may include pain medicine, surgery, blood transfusions, dialysis, IV or tube feedings, and a ventilator (breathing machine).  Values history:  This document has questions about your views, beliefs, and how you feel and think about life. This information can help others choose the care that you would choose.  Why are advance directives important?  An advance directive helps you control your care. Although spoken wishes may be used, it is better to have your wishes written down. Spoken wishes can be misunderstood, or not followed. Treatments may be given even if you do not want them. An advance directive may make it easier for your family to make difficult choices about your care.   Cigarette Smoking and Your Health   Risks to your health if you smoke:  Nicotine and other chemicals found in tobacco damage every cell in your body. Even if you are a light smoker, you have an increased risk for cancer, heart disease, and lung disease. If you are  pregnant or have diabetes, smoking increases your risk for complications.   Benefits to your health if you stop smoking:   You decrease respiratory symptoms such as coughing, wheezing, and shortness of breath.   You reduce your risk for cancers of the lung, mouth, throat, kidney, bladder, pancreas, stomach, and cervix. If you already have cancer, you increase the benefits of chemotherapy. You also reduce your risk for cancer returning or a second cancer from developing.   You reduce your risk for heart disease, blood clots, heart attack, and stroke.   You reduce your risk for lung infections, and diseases such as pneumonia, asthma, chronic bronchitis, and emphysema.  Your circulation improves. More oxygen can be delivered to your body. If you have diabetes, you lower your risk for complications, such as kidney, artery, and eye diseases. You also lower your risk for nerve damage. Nerve damage can lead to amputations, poor vision, and blindness.  You improve your body's ability to heal and to fight infections.  For more information and support to stop smoking:   [employer]  Phone: 2- 608 - 091-4475  Web Address: www.[employer]     © Copyright "[employer], [employer]" 2018 Information is for End User's use only and may not be sold, redistributed or otherwise used for commercial purposes. All illustrations and images included in CareNotes® are the copyrighted property of [employer] or [employer] overall seems to be functioning well at home.  Does seem to be doing better on current medication and appears in sinus rhythm.  Continue to push good nutrition at home eating what ever he wants.  Will continue current meds.  I did advise to do a living well and to get a routine check with the eye doctor

## 2024-01-04 NOTE — PROGRESS NOTES
Assessment and Plan:     Problem List Items Addressed This Visit          Digestive    Rectal carcinoma (HCC) (Chronic)     Has been stable.  Will continue to follow            Respiratory    Non-seasonal allergic rhinitis (Chronic)     Doing well, continue current regimen            Cardiovascular and Mediastinum    Paroxysmal atrial fibrillation (HCC) - Primary (Chronic)     Appears to be in sinus rhythm at this time.  Continue current regimen.  Is due for follow-up with cardiology            Other    Colostomy present (HCC) (Chronic)     Continue basic skin care         Mild protein-calorie malnutrition (HCC)     Malnutrition Findings: Claims appetite is good.  Push basic nutrition                           BMI Findings:           Body mass index is 18.61 kg/m².             Other Visit Diagnoses       Medicare annual wellness visit, subsequent                Depression Screening and Follow-up Plan: Patient was screened for depression during today's encounter. They screened negative with a PHQ-2 score of 0.    Tobacco Cessation Counseling: Tobacco cessation counseling was not provided. The patient is sincerely urged to quit consumption of tobacco. He is not ready to quit tobacco. Medication options not discussed.       Preventive health issues were discussed with patient, and age appropriate screening tests were ordered as noted in patient's After Visit Summary.  Personalized health advice and appropriate referrals for health education or preventive services given if needed, as noted in patient's After Visit Summary.     History of Present Illness:     Patient presents for a Medicare Wellness Visit      Patient has history of hypertension, rectal carcinoma, functional colostomy, allergic rhinitis, paroxysmal atrial fibrillation and urinary incontinence had been hospitalized for paroxysmal atrial fibrillation with lightheadedness.  Was placed on Metroprolol 50 mg twice a day as well as Plavix and digoxin every  other day instead of daily.  Was hypotensive and went back to the hospital.  The metoprolol was decreased to 12.5 mg at bedtime.  Plavix was discontinued and Eliquis 2.5 mg was started.  He is now also taking amiodarone instead of digoxin and overall is feeling well       Patient Care Team:  Nba Sweet MD as PCP - General (Family Medicine)     Review of Systems:     Review of Systems   Constitutional:  Negative for activity change, appetite change, chills, fatigue, fever and unexpected weight change.   HENT:  Positive for hearing loss. Negative for congestion, dental problem (Has no dentition and uses upper dentures), rhinorrhea and trouble swallowing.    Eyes:  Negative for visual disturbance.   Respiratory:  Negative for apnea, cough, chest tightness and shortness of breath.    Cardiovascular:  Negative for chest pain, palpitations and leg swelling.   Gastrointestinal:  Negative for abdominal distention, abdominal pain, constipation and diarrhea (Does have a functioning colostomy).   Endocrine: Negative for polyuria (Nocturia x 1).   Genitourinary:  Negative for difficulty urinating and enuresis.   Musculoskeletal:  Negative for arthralgias and myalgias.   Skin:  Negative for rash.   Allergic/Immunologic: Positive for environmental allergies.   Neurological:  Negative for dizziness, weakness, light-headedness, numbness and headaches.   Hematological:  Negative for adenopathy. Does not bruise/bleed easily.   Psychiatric/Behavioral:  Negative for agitation and sleep disturbance.         Problem List:     Patient Active Problem List   Diagnosis    Orthostatic hypotension    Paroxysmal atrial fibrillation (HCC)    Rectal carcinoma (HCC)    Colostomy present (HCC)    Non-seasonal allergic rhinitis    Mixed stress and urge urinary incontinence    Mild protein-calorie malnutrition (HCC)    Chronic renal failure, stage 3a (HCC)    Vasovagal syncope    H/O non-ST elevation myocardial infarction (NSTEMI)       Past Medical and Surgical History:     Past Medical History:   Diagnosis Date    Atrial fibrillation (HCC)     Colorectal cancer (HCC)     HL (hearing loss)     Hypertension     Pneumonia     Rectal carcinoma (HCC) 7/31/2019     Past Surgical History:   Procedure Laterality Date    COLECTOMY      COLOSTOMY        Family History:     Family History   Problem Relation Age of Onset    Heart disease Mother     Heart disease Father       Social History:     Social History     Socioeconomic History    Marital status:      Spouse name: None    Number of children: None    Years of education: None    Highest education level: None   Occupational History    None   Tobacco Use    Smoking status: Every Day     Current packs/day: 0.50     Average packs/day: 0.5 packs/day for 40.0 years (20.0 total pack years)     Types: Cigarettes    Smokeless tobacco: Never   Vaping Use    Vaping status: Never Used   Substance and Sexual Activity    Alcohol use: Yes    Drug use: No    Sexual activity: None   Other Topics Concern    None   Social History Narrative    None     Social Determinants of Health     Financial Resource Strain: Low Risk  (1/4/2024)    Overall Financial Resource Strain (CARDIA)     Difficulty of Paying Living Expenses: Not hard at all   Food Insecurity: No Food Insecurity (11/16/2023)    Received from [facility]    Hunger Vital Sign     Worried About Running Out of Food in the Last Year: Never true     Ran Out of Food in the Last Year: Never true   Transportation Needs: No Transportation Needs (1/4/2024)    PRAPARE - Transportation     Lack of Transportation (Medical): No     Lack of Transportation (Non-Medical): No   Physical Activity: Not on file   Stress: Not on file   Social Connections: Not on file   Intimate Partner Violence: Not At Risk (11/16/2023)    Received from [facility]    Humiliation, Afraid, Rape, and Kick questionnaire     Fear of Current or Ex-Partner: No      "Emotionally Abused: No     Physically Abused: No     Sexually Abused: No   Housing Stability: Low Risk  (11/16/2023)    Received from [facility]    Housing Stability Vital Sign     Unable to Pay for Housing in the Last Year: No     Number of Places Lived in the Last Year: 1     Unstable Housing in the Last Year: No      Medications and Allergies:     Current Outpatient Medications   Medication Sig Dispense Refill    amiodarone 100 mg tablet Take 100 mg by mouth daily      apixaban (ELIQUIS) 2.5 mg Take 1 tablet (2.5 mg total) by mouth 2 (two) times a day 60 tablet 0    aspirin (ECOTRIN LOW STRENGTH) 81 mg EC tablet Take 81 mg by mouth daily      atorvastatin (LIPITOR) 20 mg tablet Take 20 mg by mouth daily      azelastine (ASTELIN) 0.1 % nasal spray 1 spray into each nostril 2 (two) times a day Use in each nostril as directed 30 mL 1    metoprolol succinate (TOPROL-XL) 25 mg 24 hr tablet Take 0.5 tablets (12.5 mg total) by mouth daily at bedtime 30 tablet 0    metoprolol tartrate (LOPRESSOR) 50 mg tablet Take 50 mg by mouth every 12 (twelve) hours      montelukast (SINGULAIR) 10 mg tablet Take 1 tablet (10 mg total) by mouth daily at bedtime 90 tablet 1    Multiple Vitamin (MULTIVITAMIN) capsule Take 1 capsule by mouth daily      multivitamin (THERAGRAN) TABS Take 1 tablet by mouth daily      nitroglycerin (NITROSTAT) 0.4 mg SL tablet Place 1 tablet under the tongue every 5 (five) minutes as needed      Ostomy Supplies (New Image Closed 2-3/4\") Pouch MISC       predniSONE 20 mg tablet Take 40 mg by mouth daily       No current facility-administered medications for this visit.     No Known Allergies   Immunizations:     Immunization History   Administered Date(s) Administered    INFLUENZA 09/12/2007, 09/18/2008, 09/14/2009, 09/16/2010, 10/25/2011, 11/02/2012, 11/14/2013, 11/20/2014, 10/08/2015, 11/30/2016, 12/12/2017, 10/18/2018    Influenza, high dose seasonal 0.7 mL 10/29/2019, 10/29/2020, " 09/20/2021, 09/26/2022, 10/03/2023    Pneumococcal Polysaccharide PPV23 08/20/2014      Health Maintenance:     There are no preventive care reminders to display for this patient.      Topic Date Due    COVID-19 Vaccine (1) Never done    Pneumococcal Vaccine: 65+ Years (2 - PCV) 08/20/2015      Medicare Screening Tests and Risk Assessments:     Av is here for his Subsequent Wellness visit. Last Medicare Wellness visit information reviewed, patient interviewed and updates made to the record today.      Health Risk Assessment:   Patient rates overall health as good. Patient feels that their physical health rating is slightly better. Patient is satisfied with their life. Eyesight was rated as same. Hearing was rated as same. Patient feels that their emotional and mental health rating is same. Patients states they are never, rarely angry. Patient states they are sometimes unusually tired/fatigued. Pain experienced in the last 7 days has been none. Patient states that he has experienced no weight loss or gain in last 6 months. Does need to follow-up for routine eye care    Depression Screening:   PHQ-2 Score: 0      Fall Risk Screening:   In the past year, patient has experienced: no history of falling in past year      Home Safety:  Patient does not have trouble with stairs inside or outside of their home. Patient has working smoke alarms and has working carbon monoxide detector. Home safety hazards include: none. No issues    Nutrition:   Current diet is Regular. Push basic nutrition    Medications:   Patient is currently taking over-the-counter supplements. OTC medications include: see medication list. Patient is able to manage medications. No issues    Activities of Daily Living (ADLs)/Instrumental Activities of Daily Living (IADLs):   Walk and transfer into and out of bed and chair?: Yes  Dress and groom yourself?: Yes    Bathe or shower yourself?: Yes    Feed yourself? Yes  Do your laundry/housekeeping?:  Yes  Manage your money, pay your bills and track your expenses?: Yes  Make your own meals?: Yes    Do your own shopping?: No    ADL comments: Overall functions well around the house    Previous Hospitalizations:   Any hospitalizations or ED visits within the last 12 months?: Yes    How many hospitalizations have you had in the last year?: 1-2    Hospitalization Comments: Is going to be following up with [facility] for cardiology    Advance Care Planning:   Living will: No    Durable POA for healthcare: No    Advanced directive: No    Advanced directive counseling given: Yes    ACP document given: Yes    End of Life Decisions reviewed with patient: No      Comments: Discussed the importance of doing this and I gave his daughter a form for this    Cognitive Screening:   Provider or family/friend/caregiver concerned regarding cognition?: No    PREVENTIVE SCREENINGS      Cardiovascular Screening:    General: Screening Current      Diabetes Screening:     General: Screening Current      Colorectal Cancer Screening:     General: Screening Not Indicated and History Colorectal Cancer      Prostate Cancer Screening:    General: Screening Not Indicated      Osteoporosis Screening:    General: Screening Not Indicated      Abdominal Aortic Aneurysm (AAA) Screening:    Risk factors include: tobacco use        General: Screening Not Indicated      Lung Cancer Screening:     General: Screening Not Indicated      Hepatitis C Screening:    General: Screening Not Indicated      Preventive Screening Comments: Needs to follow-up for routine eye care    Screening, Brief Intervention, and Referral to Treatment (SBIRT)    Screening  Typical number of drinks in a day: 0  Typical number of drinks in a week: 1  Interpretation: Low risk drinking behavior.    Single Item Drug Screening:  How often have you used an illegal drug (including marijuana) or a prescription medication for non-medical reasons in the past year? never    Single Item Drug  "Screen Score: 0  Interpretation: Negative screen for possible drug use disorder    Brief Intervention  Alcohol & drug use screenings were reviewed. No concerns regarding substance use disorder identified.     Annual Depression Screening  Time spent screening and evaluating the patient for depression during today's encounter was 5 minutes.    No results found.     Physical Exam:     Blood Pressure 138/64   Height 5' 9\" (1.753 m)   Weight 57.2 kg (126 lb)   Body Mass Index 18.61 kg/m²     Physical Exam  Vitals and nursing note reviewed.   Constitutional:       Appearance: Normal appearance.   HENT:      Head: Normocephalic.      Right Ear: Tympanic membrane, ear canal and external ear normal. Decreased hearing (25 dB hearing screen is off.  Mild difficulty with conversation) noted.      Left Ear: Tympanic membrane, ear canal and external ear normal. Decreased hearing (25 dB hearing screen is off) noted.      Mouth/Throat:      Mouth: Mucous membranes are moist.      Dentition: Abnormal dentition (No lower dentition). Has dentures (Upper dentures).   Eyes:      Extraocular Movements: Extraocular movements intact.      Conjunctiva/sclera: Conjunctivae normal.      Pupils: Pupils are equal, round, and reactive to light.   Neck:      Vascular: No carotid bruit.   Cardiovascular:      Rate and Rhythm: Normal rate and regular rhythm.      Pulses:           Dorsalis pedis pulses are 0 on the right side and 0 on the left side.        Posterior tibial pulses are 1+ on the right side and 1+ on the left side.      Heart sounds: Murmur (Soft systolic murmur at left sternal border.  Heart rate is 78) heard.   Pulmonary:      Effort: Pulmonary effort is normal.      Breath sounds: Normal breath sounds.   Abdominal:      General: Abdomen is flat. There is no distension.      Palpations: Abdomen is soft. There is no mass.      Tenderness: There is no abdominal tenderness.   Musculoskeletal:         General: No swelling.      " Cervical back: Normal range of motion and neck supple. No tenderness. No muscular tenderness.      Right lower leg: No edema.      Left lower leg: No edema.      Right foot: No deformity.      Left foot: No deformity.   Feet:      Right foot:      Protective Sensation: 8 sites tested.  8 sites sensed.      Skin integrity: Skin integrity normal.      Left foot:      Protective Sensation: 8 sites tested.  8 sites sensed.      Skin integrity: Skin integrity normal.   Lymphadenopathy:      Cervical: No cervical adenopathy.   Skin:     General: Skin is warm and dry.      Capillary Refill: Capillary refill takes more than 3 seconds.      Findings: No rash.   Neurological:      General: No focal deficit present.      Mental Status: He is alert and oriented to person, place, and time.      Cranial Nerves: No cranial nerve deficit.      Sensory: No sensory deficit.      Motor: No weakness.      Coordination: Coordination normal.      Gait: Gait abnormal.      Deep Tendon Reflexes: Reflexes normal.   Psychiatric:         Mood and Affect: Mood normal.         Behavior: Behavior normal.         Thought Content: Thought content normal.         Judgment: Judgment normal.          Nba Sweet MD

## 2024-01-04 NOTE — ASSESSMENT & PLAN NOTE
Malnutrition Findings: Claims appetite is good.  Push basic nutrition                           BMI Findings:           Body mass index is 18.61 kg/m².

## 2024-01-04 NOTE — ASSESSMENT & PLAN NOTE
Appears to be in sinus rhythm at this time.  Continue current regimen.  Is due for follow-up with cardiology

## 2024-01-18 DIAGNOSIS — I25.2 H/O NON-ST ELEVATION MYOCARDIAL INFARCTION (NSTEMI): Primary | ICD-10-CM

## 2024-01-18 RX ORDER — ATORVASTATIN CALCIUM 20 MG/1
20 TABLET, FILM COATED ORAL DAILY
Qty: 30 TABLET | Refills: 5 | Status: SHIPPED | OUTPATIENT
Start: 2024-01-18

## 2024-01-18 NOTE — TELEPHONE ENCOUNTER
Previously prescribed by doctor in the hospital.    Reason for call:   [x] Refill   [] Prior Auth  [] Other:     Office:   [x] PCP/Provider - Nba Sweet   [] Specialty/Provider -     Medication: Atorvastatin     Dose/Frequency: 20 mg tablet taken by mouth once daily     Quantity: 90    Pharmacy: [facility] [address]     Does the patient have enough for 3 days?   [x] Yes   [] No - Send as HP to POD

## 2024-02-01 DIAGNOSIS — I48.0 PAROXYSMAL ATRIAL FIBRILLATION (HCC): ICD-10-CM

## 2024-02-01 NOTE — TELEPHONE ENCOUNTER
Reason for call:   [x] Refill   [] Prior Auth  [] Other:     Office:   [x] PCP/Provider -   [] Specialty/Provider -     Medication: Apixaban    Dose/Frequency: 2.5 mg tablet taken by mouth 2x daily     Quantity: 180    Pharmacy: [facility] [address]     Does the patient have enough for 3 days?   [] Yes   [x] No - Send as HP to POD

## 2024-02-17 DIAGNOSIS — Z93.3 COLOSTOMY PRESENT (HCC): Primary | Chronic | ICD-10-CM

## 2024-02-19 RX ORDER — OSTOMY SUPPLY 1 3/4"
EACH MISCELLANEOUS
Qty: 30 WAFER | Refills: 5 | Status: SHIPPED | OUTPATIENT
Start: 2024-02-19

## 2024-02-19 RX ORDER — OSTOMY SUPPLY 1 3/4"
EACH MISCELLANEOUS
Qty: 30 EACH | Refills: 5 | Status: SHIPPED | OUTPATIENT
Start: 2024-02-19

## 2024-03-04 DIAGNOSIS — I48.0 PAROXYSMAL ATRIAL FIBRILLATION (HCC): ICD-10-CM

## 2024-03-04 RX ORDER — METOPROLOL SUCCINATE 25 MG/1
12.5 TABLET, EXTENDED RELEASE ORAL
Qty: 45 TABLET | Refills: 1 | Status: SHIPPED | OUTPATIENT
Start: 2024-03-04 | End: 2024-08-31

## 2024-03-04 NOTE — TELEPHONE ENCOUNTER
Reason for call:   [x] Refill   [] Prior Auth  [] Other:     Office: [facility]   [x] PCP/Provider - kamille   [] Specialty/Provider -     Medication: metoprolol succinate     Dose/Frequency: 25 mg/ Take half tablet daily     Quantity: 90 day supply      Pharmacy: [facility]     Does the patient have enough for 3 days?   [] Yes   [x] No - Send as HP to POD

## 2024-03-05 ENCOUNTER — HOSPITAL ENCOUNTER (EMERGENCY)
Facility: HOSPITAL | Age: 89
Discharge: HOME | End: 2024-03-05
Attending: EMERGENCY MEDICINE
Payer: MEDICARE

## 2024-03-05 ENCOUNTER — APPOINTMENT (EMERGENCY)
Dept: CT IMAGING | Facility: HOSPITAL | Age: 89
End: 2024-03-05
Payer: MEDICARE

## 2024-03-05 VITALS
OXYGEN SATURATION: 97 % | SYSTOLIC BLOOD PRESSURE: 138 MMHG | WEIGHT: 126 LBS | RESPIRATION RATE: 17 BRPM | BODY MASS INDEX: 18.61 KG/M2 | HEART RATE: 71 BPM | DIASTOLIC BLOOD PRESSURE: 62 MMHG | TEMPERATURE: 97.8 F

## 2024-03-05 DIAGNOSIS — I10 HYPERTENSION: ICD-10-CM

## 2024-03-05 DIAGNOSIS — K59.00 CONSTIPATION: ICD-10-CM

## 2024-03-05 DIAGNOSIS — R31.9 HEMATURIA: Primary | ICD-10-CM

## 2024-03-05 LAB
ABO GROUP BLD: NORMAL
ABO GROUP BLD: NORMAL
ALBUMIN SERPL BCP-MCNC: 4.4 G/DL (ref 3.5–5)
ALP SERPL-CCNC: 64 U/L (ref 34–104)
ALT SERPL W P-5'-P-CCNC: 13 U/L (ref 7–52)
ANION GAP SERPL CALCULATED.3IONS-SCNC: 8 MMOL/L
APTT PPP: 30 SECONDS (ref 23–37)
AST SERPL W P-5'-P-CCNC: 27 U/L (ref 13–39)
BACTERIA UR QL AUTO: ABNORMAL /HPF
BASOPHILS # BLD AUTO: 0.09 THOUSANDS/ÂΜL (ref 0–0.1)
BASOPHILS NFR BLD AUTO: 1 % (ref 0–1)
BILIRUB SERPL-MCNC: 0.5 MG/DL (ref 0.2–1)
BILIRUB UR QL STRIP: ABNORMAL
BLD GP AB SCN SERPL QL: NEGATIVE
BUN SERPL-MCNC: 38 MG/DL (ref 5–25)
CALCIUM SERPL-MCNC: 9.6 MG/DL (ref 8.4–10.2)
CHLORIDE SERPL-SCNC: 97 MMOL/L (ref 96–108)
CLARITY UR: ABNORMAL
CO2 SERPL-SCNC: 26 MMOL/L (ref 21–32)
COLOR UR: ABNORMAL
CREAT SERPL-MCNC: 1.76 MG/DL (ref 0.6–1.3)
EOSINOPHIL # BLD AUTO: 0.62 THOUSAND/ÂΜL (ref 0–0.61)
EOSINOPHIL NFR BLD AUTO: 8 % (ref 0–6)
ERYTHROCYTE [DISTWIDTH] IN BLOOD BY AUTOMATED COUNT: 12.9 % (ref 11.6–15.1)
GFR SERPL CREATININE-BSD FRML MDRD: 32 ML/MIN/1.73SQ M
GLUCOSE SERPL-MCNC: 97 MG/DL (ref 65–140)
GLUCOSE UR STRIP-MCNC: NEGATIVE MG/DL
HCT VFR BLD AUTO: 33.1 % (ref 36.5–49.3)
HGB BLD-MCNC: 11 G/DL (ref 12–17)
HGB UR QL STRIP.AUTO: ABNORMAL
IMM GRANULOCYTES # BLD AUTO: 0.03 THOUSAND/UL (ref 0–0.2)
IMM GRANULOCYTES NFR BLD AUTO: 0 % (ref 0–2)
INR PPP: 1.02 (ref 0.84–1.19)
KETONES UR STRIP-MCNC: NEGATIVE MG/DL
LEUKOCYTE ESTERASE UR QL STRIP: NEGATIVE
LIPASE SERPL-CCNC: 19 U/L (ref 11–82)
LYMPHOCYTES # BLD AUTO: 3.16 THOUSANDS/ÂΜL (ref 0.6–4.47)
LYMPHOCYTES NFR BLD AUTO: 40 % (ref 14–44)
MCH RBC QN AUTO: 32 PG (ref 26.8–34.3)
MCHC RBC AUTO-ENTMCNC: 33.2 G/DL (ref 31.4–37.4)
MCV RBC AUTO: 96 FL (ref 82–98)
MONOCYTES # BLD AUTO: 0.87 THOUSAND/ÂΜL (ref 0.17–1.22)
MONOCYTES NFR BLD AUTO: 11 % (ref 4–12)
MUCOUS THREADS UR QL AUTO: ABNORMAL
NEUTROPHILS # BLD AUTO: 3.24 THOUSANDS/ÂΜL (ref 1.85–7.62)
NEUTS SEG NFR BLD AUTO: 40 % (ref 43–75)
NITRITE UR QL STRIP: NEGATIVE
NON-SQ EPI CELLS URNS QL MICRO: ABNORMAL /HPF
NRBC BLD AUTO-RTO: 0 /100 WBCS
PH UR STRIP.AUTO: 6.5 [PH]
PLATELET # BLD AUTO: 147 THOUSANDS/UL (ref 149–390)
PMV BLD AUTO: 10.7 FL (ref 8.9–12.7)
POTASSIUM SERPL-SCNC: 4.8 MMOL/L (ref 3.5–5.3)
PROT SERPL-MCNC: 7.6 G/DL (ref 6.4–8.4)
PROT UR STRIP-MCNC: ABNORMAL MG/DL
PROTHROMBIN TIME: 13.7 SECONDS (ref 11.6–14.5)
RBC # BLD AUTO: 3.44 MILLION/UL (ref 3.88–5.62)
RBC #/AREA URNS AUTO: ABNORMAL /HPF
RH BLD: POSITIVE
RH BLD: POSITIVE
SODIUM SERPL-SCNC: 131 MMOL/L (ref 135–147)
SP GR UR STRIP.AUTO: 1.01 (ref 1–1.03)
SPECIMEN EXPIRATION DATE: NORMAL
UROBILINOGEN UR QL STRIP.AUTO: 0.2 E.U./DL
WBC # BLD AUTO: 8.01 THOUSAND/UL (ref 4.31–10.16)
WBC #/AREA URNS AUTO: ABNORMAL /HPF

## 2024-03-05 PROCEDURE — 36415 COLL VENOUS BLD VENIPUNCTURE: CPT | Performed by: EMERGENCY MEDICINE

## 2024-03-05 PROCEDURE — 83690 ASSAY OF LIPASE: CPT | Performed by: EMERGENCY MEDICINE

## 2024-03-05 PROCEDURE — 85730 THROMBOPLASTIN TIME PARTIAL: CPT | Performed by: EMERGENCY MEDICINE

## 2024-03-05 PROCEDURE — 81001 URINALYSIS AUTO W/SCOPE: CPT | Performed by: EMERGENCY MEDICINE

## 2024-03-05 PROCEDURE — 86901 BLOOD TYPING SEROLOGIC RH(D): CPT | Performed by: EMERGENCY MEDICINE

## 2024-03-05 PROCEDURE — 86850 RBC ANTIBODY SCREEN: CPT | Performed by: EMERGENCY MEDICINE

## 2024-03-05 PROCEDURE — 85610 PROTHROMBIN TIME: CPT | Performed by: EMERGENCY MEDICINE

## 2024-03-05 PROCEDURE — 74176 CT ABD & PELVIS W/O CONTRAST: CPT

## 2024-03-05 PROCEDURE — 80053 COMPREHEN METABOLIC PANEL: CPT | Performed by: EMERGENCY MEDICINE

## 2024-03-05 PROCEDURE — 86900 BLOOD TYPING SEROLOGIC ABO: CPT | Performed by: EMERGENCY MEDICINE

## 2024-03-05 PROCEDURE — 99285 EMERGENCY DEPT VISIT HI MDM: CPT | Performed by: EMERGENCY MEDICINE

## 2024-03-05 PROCEDURE — 99284 EMERGENCY DEPT VISIT MOD MDM: CPT

## 2024-03-05 PROCEDURE — 96374 THER/PROPH/DIAG INJ IV PUSH: CPT

## 2024-03-05 PROCEDURE — 85025 COMPLETE CBC W/AUTO DIFF WBC: CPT | Performed by: EMERGENCY MEDICINE

## 2024-03-05 RX ORDER — DOCUSATE SODIUM 100 MG/1
100 CAPSULE, LIQUID FILLED ORAL EVERY 12 HOURS
Qty: 60 CAPSULE | Refills: 0 | Status: SHIPPED | OUTPATIENT
Start: 2024-03-05

## 2024-03-05 RX ORDER — HYDRALAZINE HYDROCHLORIDE 20 MG/ML
10 INJECTION INTRAMUSCULAR; INTRAVENOUS ONCE
Status: COMPLETED | OUTPATIENT
Start: 2024-03-05 | End: 2024-03-05

## 2024-03-05 RX ADMIN — HYDRALAZINE HYDROCHLORIDE 10 MG: 20 INJECTION INTRAMUSCULAR; INTRAVENOUS at 16:50

## 2024-03-05 NOTE — DISCHARGE INSTRUCTIONS
Return to the ER immediately for any worsening symptoms. Follow up with your doctor for further evaluation and management. Please stop the eliquis for 24 -48 hours.

## 2024-03-05 NOTE — ED PROVIDER NOTES
"History  Chief Complaint   Patient presents with    Blood in Urine     Pt urinating blood since yesterday, ozzie red blood. Pain with urination      89 yr old male presents to the ED for evaluation of dysuria and hematuria that began yesterday. Patient has a history of afib and was started on eliquis in December. He has not had any coagulation issues in the past. He states that it is a copious amount of blood and denies any abdominal pain. Ddx: adverse drug reaction, cystitis, UTI, neoplasm,         Prior to Admission Medications   Prescriptions Last Dose Informant Patient Reported? Taking?   Multiple Vitamin (MULTIVITAMIN) capsule  Self Yes No   Sig: Take 1 capsule by mouth daily   Ostomy Supplies (New Image Closed 2-3/4\") Pouch MISC   No No   Sig: USE AS DIRECTED BY YOUR PHYSICIAN.   Ostomy Supplies (New Image Flat Barrier 70MM) WAFR   No No   Sig: USE AS DIRECTED BY YOUR PHYSICIAN.   amiodarone 100 mg tablet   Yes No   Sig: Take 100 mg by mouth daily   apixaban (ELIQUIS) 2.5 mg   No No   Sig: Take 1 tablet (2.5 mg total) by mouth 2 (two) times a day   aspirin (ECOTRIN LOW STRENGTH) 81 mg EC tablet  Self Yes No   Sig: Take 81 mg by mouth daily   atorvastatin (LIPITOR) 20 mg tablet   No No   Sig: Take 1 tablet (20 mg total) by mouth daily   azelastine (ASTELIN) 0.1 % nasal spray   No No   Si spray into each nostril 2 (two) times a day Use in each nostril as directed   metoprolol succinate (TOPROL-XL) 25 mg 24 hr tablet   No No   Sig: Take 0.5 tablets (12.5 mg total) by mouth daily at bedtime   metoprolol tartrate (LOPRESSOR) 50 mg tablet   Yes No   Sig: Take 50 mg by mouth every 12 (twelve) hours   montelukast (SINGULAIR) 10 mg tablet   No No   Sig: Take 1 tablet (10 mg total) by mouth daily at bedtime   multivitamin (THERAGRAN) TABS   Yes No   Sig: Take 1 tablet by mouth daily   nitroglycerin (NITROSTAT) 0.4 mg SL tablet   Yes No   Sig: Place 1 tablet under the tongue every 5 (five) minutes as needed "   predniSONE 20 mg tablet   Yes No   Sig: Take 40 mg by mouth daily      Facility-Administered Medications: None       Past Medical History:   Diagnosis Date    Atrial fibrillation (HCC)     Colorectal cancer (HCC)     HL (hearing loss)     Hypertension     Pneumonia     Rectal carcinoma (HCC) 7/31/2019       Past Surgical History:   Procedure Laterality Date    COLECTOMY      COLOSTOMY         Family History   Problem Relation Age of Onset    Heart disease Mother     Heart disease Father      I have reviewed and agree with the history as documented.    E-Cigarette/Vaping    E-Cigarette Use Never User      E-Cigarette/Vaping Substances     Social History     Tobacco Use    Smoking status: Every Day     Current packs/day: 0.50     Average packs/day: 0.5 packs/day for 40.0 years (20.0 ttl pk-yrs)     Types: Cigarettes    Smokeless tobacco: Never   Vaping Use    Vaping status: Never Used   Substance Use Topics    Alcohol use: Yes    Drug use: No       Review of Systems   Constitutional:  Negative for chills and fever.   HENT:  Negative for ear pain and sore throat.    Eyes:  Negative for pain and visual disturbance.   Respiratory:  Negative for cough and shortness of breath.    Cardiovascular:  Negative for chest pain and palpitations.   Gastrointestinal: Negative.  Negative for abdominal pain and vomiting.   Genitourinary:  Positive for dysuria and hematuria.   Musculoskeletal:  Negative for arthralgias and back pain.   Skin:  Negative for color change and rash.   Neurological:  Negative for seizures and syncope.   All other systems reviewed and are negative.      Physical Exam  Physical Exam  Vitals and nursing note reviewed.   Constitutional:       General: He is not in acute distress.     Appearance: Normal appearance. He is well-developed and normal weight. He is not ill-appearing.   HENT:      Head: Normocephalic and atraumatic.      Right Ear: External ear normal.      Left Ear: External ear normal.      Nose:  Nose normal.      Mouth/Throat:      Mouth: Mucous membranes are moist.      Pharynx: No oropharyngeal exudate.   Eyes:      Extraocular Movements: Extraocular movements intact.      Conjunctiva/sclera: Conjunctivae normal.   Cardiovascular:      Rate and Rhythm: Normal rate and regular rhythm.      Pulses: Normal pulses.      Heart sounds: Normal heart sounds. No murmur heard.  Pulmonary:      Effort: Pulmonary effort is normal. No respiratory distress.      Breath sounds: Normal breath sounds.   Abdominal:      General: Abdomen is flat. Bowel sounds are normal. There is no distension.      Palpations: Abdomen is soft. There is no mass.      Tenderness: There is no abdominal tenderness. There is no guarding or rebound.      Hernia: No hernia is present.   Musculoskeletal:         General: No swelling, tenderness, deformity or signs of injury. Normal range of motion.      Cervical back: Normal range of motion and neck supple.      Right lower leg: No edema.      Left lower leg: No edema.   Skin:     General: Skin is warm and dry.      Capillary Refill: Capillary refill takes less than 2 seconds.   Neurological:      General: No focal deficit present.      Mental Status: He is alert and oriented to person, place, and time. Mental status is at baseline.   Psychiatric:         Mood and Affect: Mood normal.         Vital Signs  ED Triage Vitals [03/05/24 1510]   Temperature Pulse Respirations Blood Pressure SpO2   97.8 °F (36.6 °C) 59 17 (!) 194/89 98 %      Temp Source Heart Rate Source Patient Position - Orthostatic VS BP Location FiO2 (%)   Temporal Monitor Sitting Left arm --      Pain Score       --           Vitals:    03/05/24 1700 03/05/24 1715 03/05/24 1745 03/05/24 1800   BP: (!) 184/79 (!) 174/80 169/75 166/76   Pulse: 64 65 67 64   Patient Position - Orthostatic VS:             Visual Acuity      ED Medications  Medications   hydrALAZINE (APRESOLINE) injection 10 mg (10 mg Intravenous Given 3/5/24 1650)        Diagnostic Studies  Results Reviewed       Procedure Component Value Units Date/Time    CMP [[id number]]  (Abnormal) Collected: 03/05/24 1555    Lab Status: Final result Specimen: Blood from Arm, Right Updated: 03/05/24 1623     Sodium 131 mmol/L      Potassium 4.8 mmol/L      Chloride 97 mmol/L      CO2 26 mmol/L      ANION GAP 8 mmol/L      BUN 38 mg/dL      Creatinine 1.76 mg/dL      Glucose 97 mg/dL      Calcium 9.6 mg/dL      AST 27 U/L      ALT 13 U/L      Alkaline Phosphatase 64 U/L      Total Protein 7.6 g/dL      Albumin 4.4 g/dL      Total Bilirubin 0.50 mg/dL      eGFR 32 ml/min/1.73sq m     Narrative:      National Kidney Disease Foundation guidelines for Chronic Kidney Disease (CKD):     Stage 1 with normal or high GFR (GFR > 90 mL/min/1.73 square meters)    Stage 2 Mild CKD (GFR = 60-89 mL/min/1.73 square meters)    Stage 3A Moderate CKD (GFR = 45-59 mL/min/1.73 square meters)    Stage 3B Moderate CKD (GFR = 30-44 mL/min/1.73 square meters)    Stage 4 Severe CKD (GFR = 15-29 mL/min/1.73 square meters)    Stage 5 End Stage CKD (GFR <15 mL/min/1.73 square meters)  Note: GFR calculation is accurate only with a steady state creatinine    Lipase [[id number]]  (Normal) Collected: 03/05/24 1555    Lab Status: Final result Specimen: Blood from Arm, Right Updated: 03/05/24 1623     Lipase 19 u/L     Protime-INR [[id number]]  (Normal) Collected: 03/05/24 1555    Lab Status: Final result Specimen: Blood from Arm, Right Updated: 03/05/24 1619     Protime 13.7 seconds      INR 1.02    APTT [[id number]]  (Normal) Collected: 03/05/24 1555    Lab Status: Final result Specimen: Blood from Arm, Right Updated: 03/05/24 1619     PTT 30 seconds     Urine Microscopic [[id number]]  (Abnormal) Collected: 03/05/24 1555    Lab Status: Final result Specimen: Urine, Clean Catch Updated: 03/05/24 1615     RBC, UA Innumerable /hpf      WBC, UA 1-2 /hpf      Epithelial Cells None Seen /hpf      Bacteria, UA Occasional /hpf       MUCUS THREADS Occasional    UA w Reflex to Microscopic w Reflex to Culture [[id number]]  (Abnormal) Collected: 03/05/24 1555    Lab Status: Final result Specimen: Urine, Clean Catch Updated: 03/05/24 1605     Color, UA Lindy     Clarity, UA Cloudy     Specific Gravity, UA 1.010     pH, UA 6.5     Leukocytes, UA Negative     Nitrite, UA Negative     Protein, UA 30 (1+) mg/dl      Glucose, UA Negative mg/dl      Ketones, UA Negative mg/dl      Urobilinogen, UA 0.2 E.U./dl      Bilirubin, UA Small     Occult Blood, UA Large    CBC and differential [[id number]]  (Abnormal) Collected: 03/05/24 1555    Lab Status: Final result Specimen: Blood from Arm, Right Updated: 03/05/24 1604     WBC 8.01 Thousand/uL      RBC 3.44 Million/uL      Hemoglobin 11.0 g/dL      Hematocrit 33.1 %      MCV 96 fL      MCH 32.0 pg      MCHC 33.2 g/dL      RDW 12.9 %      MPV 10.7 fL      Platelets 147 Thousands/uL      nRBC 0 /100 WBCs      Neutrophils Relative 40 %      Immat GRANS % 0 %      Lymphocytes Relative 40 %      Monocytes Relative 11 %      Eosinophils Relative 8 %      Basophils Relative 1 %      Neutrophils Absolute 3.24 Thousands/µL      Immature Grans Absolute 0.03 Thousand/uL      Lymphocytes Absolute 3.16 Thousands/µL      Monocytes Absolute 0.87 Thousand/µL      Eosinophils Absolute 0.62 Thousand/µL      Basophils Absolute 0.09 Thousands/µL                    CT abdomen pelvis wo contrast   Final Result by Johann Jaeger MD (03/05 1646)         1. Findings compatible with constipation. No evidence of bowel obstruction, colitis or diverticulitis.   2. No evidence of nephrolithiasis or obstructive uropathy.      Workstation performed: [id number]                    Procedures  Procedures         ED Course  ED Course as of 03/05/24 1834   Tue Mar 05, 2024   1828 Patient stable during the ED course states his hematuria is improving and states that  he has no dizziness or weakness. His Hemoglobin is stable and family is  encouraged to watch if symptoms continue or worsen in any way. Advised to follow up with the PCP tomorrow.                               SBIRT 20yo+      Flowsheet Row Most Recent Value   Initial Alcohol Screen: US AUDIT-C     1. How often do you have a drink containing alcohol? 0 Filed at: 03/05/2024 1512   2. How many drinks containing alcohol do you have on a typical day you are drinking?  0 Filed at: 03/05/2024 1512   3a. Male UNDER 65: How often do you have five or more drinks on one occasion? 0 Filed at: 03/05/2024 1512   3b. FEMALE Any Age, or MALE 65+: How often do you have 4 or more drinks on one occassion? 0 Filed at: 03/05/2024 1512   Audit-C Score 0 Filed at: 03/05/2024 1512   SAYRA: How many times in the past year have you...    Used an illegal drug or used a prescription medication for non-medical reasons? Never Filed at: 03/05/2024 1512                      Medical Decision Making  89 yr old male presents to the ED for evaluation of dysuria and hematuria that began yesterday. Patient has a history of afib and was started on eliquis in December. He has not had any coagulation issues in the past. He states that it is a copious amount of blood and denies any abdominal pain. Ddx: adverse drug reaction, cystitis, UTI, neoplasm.    Amount and/or Complexity of Data Reviewed  Labs: ordered.  Radiology: ordered.    Risk  OTC drugs.  Prescription drug management.             Disposition  Final diagnoses:   Hematuria   Hypertension   Constipation     Time reflects when diagnosis was documented in both MDM as applicable and the Disposition within this note       Time User Action Codes Description Comment    3/5/2024  6:25 PM Lashonda Concepcion Add [R31.9] Hematuria     3/5/2024  6:25 PM Lashonda Concepcion Add [I10] Hypertension     3/5/2024  6:25 PM Lashonda Concepcion Add [K59.00] Constipation           ED Disposition       ED Disposition   Discharge    Condition   Stable    Date/Time   Tue Mar 5, 2024 7614    Comment    Av Rossi Jr. discharge to home/self care.                   Follow-up Information       Follow up With Specialties Details Why Contact Info    Nba Sweet MD Family Medicine In 1 day  [address] [id number] [phone]              Patient's Medications   Discharge Prescriptions    DOCUSATE SODIUM (COLACE) 100 MG CAPSULE    Take 1 capsule (100 mg total) by mouth every 12 (twelve) hours       Start Date: 3/5/2024  End Date: --       Order Dose: 100 mg       Quantity: 60 capsule    Refills: 0       No discharge procedures on file.    PDMP Review       None            ED Provider  Electronically Signed by             Lashonda Concepcion DO  03/05/24 4122

## 2024-03-06 ENCOUNTER — TELEPHONE (OUTPATIENT)
Age: 89
End: 2024-03-06

## 2024-03-06 ENCOUNTER — VBI (OUTPATIENT)
Dept: FAMILY MEDICINE CLINIC | Facility: CLINIC | Age: 89
End: 2024-03-06

## 2024-03-06 NOTE — TELEPHONE ENCOUNTER
03/06/24 1:02 PM    Patient contacted post ED visit, first outreach attempt made. Message was left for patient to return a call to the [facility] Department at [facility]: Phone [phone].    Thank you.  [facility] Thurman  [facility]

## 2024-03-06 NOTE — TELEPHONE ENCOUNTER
Patients daughter called and stated she had taken patient to the ER for blood in his urine. The ER stated to stop his eliquis for 2 days at least. Patients daughter is wondering what they should do with patients medications. Patients daughter is wondering if the baby aspirin would be sufficient enough for him to take. Please advise. Please return patient daughter call to discuss.

## 2024-03-07 NOTE — TELEPHONE ENCOUNTER
03/07/24 10:20 AM    Patient contacted post ED visit, second outreach attempt made. Message was left for patient to return a call to the [facility] Department at [facility]: Phone [phone].    Thank you.  [facility] Thurman  [facility]

## 2024-03-16 ENCOUNTER — HOSPITAL ENCOUNTER (EMERGENCY)
Facility: HOSPITAL | Age: 89
Discharge: HOME | End: 2024-03-16
Attending: EMERGENCY MEDICINE
Payer: MEDICARE

## 2024-03-16 VITALS
DIASTOLIC BLOOD PRESSURE: 95 MMHG | SYSTOLIC BLOOD PRESSURE: 216 MMHG | WEIGHT: 138.89 LBS | HEIGHT: 69 IN | BODY MASS INDEX: 20.57 KG/M2 | TEMPERATURE: 97.3 F | RESPIRATION RATE: 18 BRPM | HEART RATE: 66 BPM | OXYGEN SATURATION: 99 %

## 2024-03-16 DIAGNOSIS — R31.9 HEMATURIA: Primary | ICD-10-CM

## 2024-03-16 DIAGNOSIS — I48.0 PAROXYSMAL ATRIAL FIBRILLATION (HCC): Chronic | ICD-10-CM

## 2024-03-16 LAB
ANION GAP SERPL CALCULATED.3IONS-SCNC: 8 MMOL/L (ref 4–13)
BACTERIA UR QL AUTO: ABNORMAL /HPF
BASOPHILS # BLD AUTO: 0.08 THOUSANDS/ÂΜL (ref 0–0.1)
BASOPHILS NFR BLD AUTO: 1 % (ref 0–1)
BILIRUB UR QL STRIP: ABNORMAL
BUN SERPL-MCNC: 32 MG/DL (ref 5–25)
CALCIUM SERPL-MCNC: 9.6 MG/DL (ref 8.4–10.2)
CHLORIDE SERPL-SCNC: 101 MMOL/L (ref 96–108)
CLARITY UR: CLEAR
CO2 SERPL-SCNC: 26 MMOL/L (ref 21–32)
COLOR UR: YELLOW
CREAT SERPL-MCNC: 1.91 MG/DL (ref 0.6–1.3)
EOSINOPHIL # BLD AUTO: 0.52 THOUSAND/ÂΜL (ref 0–0.61)
EOSINOPHIL NFR BLD AUTO: 7 % (ref 0–6)
ERYTHROCYTE [DISTWIDTH] IN BLOOD BY AUTOMATED COUNT: 12.9 % (ref 11.6–15.1)
GFR SERPL CREATININE-BSD FRML MDRD: 29 ML/MIN/1.73SQ M
GLUCOSE SERPL-MCNC: 98 MG/DL (ref 65–140)
GLUCOSE UR STRIP-MCNC: NEGATIVE MG/DL
HCT VFR BLD AUTO: 32.6 % (ref 36.5–49.3)
HGB BLD-MCNC: 10.7 G/DL (ref 12–17)
HGB UR QL STRIP.AUTO: ABNORMAL
IMM GRANULOCYTES # BLD AUTO: 0.03 THOUSAND/UL (ref 0–0.2)
IMM GRANULOCYTES NFR BLD AUTO: 0 % (ref 0–2)
KETONES UR STRIP-MCNC: NEGATIVE MG/DL
LEUKOCYTE ESTERASE UR QL STRIP: NEGATIVE
LYMPHOCYTES # BLD AUTO: 3.29 THOUSANDS/ÂΜL (ref 0.6–4.47)
LYMPHOCYTES NFR BLD AUTO: 42 % (ref 14–44)
MCH RBC QN AUTO: 32.6 PG (ref 26.8–34.3)
MCHC RBC AUTO-ENTMCNC: 32.8 G/DL (ref 31.4–37.4)
MCV RBC AUTO: 99 FL (ref 82–98)
MONOCYTES # BLD AUTO: 0.66 THOUSAND/ÂΜL (ref 0.17–1.22)
MONOCYTES NFR BLD AUTO: 9 % (ref 4–12)
NEUTROPHILS # BLD AUTO: 3.13 THOUSANDS/ÂΜL (ref 1.85–7.62)
NEUTS SEG NFR BLD AUTO: 41 % (ref 43–75)
NITRITE UR QL STRIP: NEGATIVE
NON-SQ EPI CELLS URNS QL MICRO: ABNORMAL /HPF
NRBC BLD AUTO-RTO: 0 /100 WBCS
PH UR STRIP.AUTO: 6.5 [PH]
PLATELET # BLD AUTO: 148 THOUSANDS/UL (ref 149–390)
PMV BLD AUTO: 10.3 FL (ref 8.9–12.7)
POTASSIUM SERPL-SCNC: 4.5 MMOL/L (ref 3.5–5.3)
PROT UR STRIP-MCNC: ABNORMAL MG/DL
RBC # BLD AUTO: 3.28 MILLION/UL (ref 3.88–5.62)
RBC #/AREA URNS AUTO: ABNORMAL /HPF
SODIUM SERPL-SCNC: 135 MMOL/L (ref 135–147)
SP GR UR STRIP.AUTO: 1.02 (ref 1–1.03)
UROBILINOGEN UR QL STRIP.AUTO: 0.2 E.U./DL
WBC # BLD AUTO: 7.71 THOUSAND/UL (ref 4.31–10.16)
WBC #/AREA URNS AUTO: ABNORMAL /HPF

## 2024-03-16 PROCEDURE — 80048 BASIC METABOLIC PNL TOTAL CA: CPT | Performed by: EMERGENCY MEDICINE

## 2024-03-16 PROCEDURE — 81001 URINALYSIS AUTO W/SCOPE: CPT | Performed by: EMERGENCY MEDICINE

## 2024-03-16 PROCEDURE — 99284 EMERGENCY DEPT VISIT MOD MDM: CPT | Performed by: EMERGENCY MEDICINE

## 2024-03-16 PROCEDURE — 36415 COLL VENOUS BLD VENIPUNCTURE: CPT | Performed by: EMERGENCY MEDICINE

## 2024-03-16 PROCEDURE — 85025 COMPLETE CBC W/AUTO DIFF WBC: CPT | Performed by: EMERGENCY MEDICINE

## 2024-03-16 PROCEDURE — 99283 EMERGENCY DEPT VISIT LOW MDM: CPT

## 2024-03-16 RX ORDER — LIDOCAINE HYDROCHLORIDE 20 MG/ML
1 JELLY TOPICAL ONCE
Status: COMPLETED | OUTPATIENT
Start: 2024-03-16 | End: 2024-03-16

## 2024-03-16 RX ORDER — ASPIRIN 81 MG/1
81 TABLET ORAL DAILY
Qty: 30 TABLET | Refills: 0 | Status: SHIPPED | OUTPATIENT
Start: 2024-03-16 | End: 2024-04-15

## 2024-03-16 RX ADMIN — LIDOCAINE HYDROCHLORIDE 1 APPLICATION: 20 JELLY TOPICAL at 13:21

## 2024-03-16 NOTE — DISCHARGE INSTRUCTIONS
Please restart your low-dose (81 mg) aspirin.  Please do not restart the Eliquis until discuss further with urology.

## 2024-03-16 NOTE — ED PROVIDER NOTES
"History  Chief Complaint   Patient presents with    Blood in Urine     Pt arrives with family reporting blood in urine for past 2 weeks ago.  Pt was on blood thinner and ASA and was told to stop but continues to have blood in urine.      89-year-old male with recurrent hematuria.  Recently seen for same.  Antiplatelet anticoagulant were discontinued.  Patient had reoccurrence of symptoms today.  Reports is nonpainful.  Presents with family at bedside.  Patient has no follow-up scheduled with urology at this time.      Blood in Urine  This is a recurrent problem. The current episode started 1 to 4 weeks ago. The problem has been waxing and waning since onset. He describes the hematuria as gross hematuria. The hematuria occurs during the terminal portion of his urinary stream. He reports clotting at the end of his urine stream. He is experiencing no pain. Obstructive symptoms do not include incomplete emptying. Pertinent negatives include no abdominal pain, bladder pain, bone pain, chills, dysuria, flank pain or nausea.   89-year-old male with recurrent    Prior to Admission Medications   Prescriptions Last Dose Informant Patient Reported? Taking?   Multiple Vitamin (MULTIVITAMIN) capsule  Self Yes No   Sig: Take 1 capsule by mouth daily   Ostomy Supplies (New Image Closed 2-3/4\") Pouch MISC   No No   Sig: USE AS DIRECTED BY YOUR PHYSICIAN.   Ostomy Supplies (New Image Flat Barrier 70MM) WAFR   No No   Sig: USE AS DIRECTED BY YOUR PHYSICIAN.   amiodarone 100 mg tablet   Yes Yes   Sig: Take 100 mg by mouth daily   aspirin (ECOTRIN LOW STRENGTH) 81 mg EC tablet Not Taking Self Yes No   Sig: Take 81 mg by mouth daily   Patient not taking: Reported on 3/16/2024   aspirin (ECOTRIN LOW STRENGTH) 81 mg EC tablet   No Yes   Sig: Take 1 tablet (81 mg total) by mouth daily   atorvastatin (LIPITOR) 20 mg tablet   No Yes   Sig: Take 1 tablet (20 mg total) by mouth daily   docusate sodium (COLACE) 100 mg capsule   No Yes   Sig: " Take 1 capsule (100 mg total) by mouth every 12 (twelve) hours   metoprolol succinate (TOPROL-XL) 25 mg 24 hr tablet   No Yes   Sig: Take 0.5 tablets (12.5 mg total) by mouth daily at bedtime   metoprolol tartrate (LOPRESSOR) 50 mg tablet   Yes No   Sig: Take 50 mg by mouth every 12 (twelve) hours   montelukast (SINGULAIR) 10 mg tablet   No Yes   Sig: Take 1 tablet (10 mg total) by mouth daily at bedtime   multivitamin (THERAGRAN) TABS   Yes No   Sig: Take 1 tablet by mouth daily   nitroglycerin (NITROSTAT) 0.4 mg SL tablet   Yes No   Sig: Place 1 tablet under the tongue every 5 (five) minutes as needed   predniSONE 20 mg tablet   Yes No   Sig: Take 40 mg by mouth daily      Facility-Administered Medications: None       Past Medical History:   Diagnosis Date    Atrial fibrillation (HCC)     Colorectal cancer (HCC)     HL (hearing loss)     Hypertension     Pneumonia     Rectal carcinoma (HCC) 7/31/2019       Past Surgical History:   Procedure Laterality Date    COLECTOMY      COLOSTOMY         Family History   Problem Relation Age of Onset    Heart disease Mother     Heart disease Father      I have reviewed and agree with the history as documented.    E-Cigarette/Vaping    E-Cigarette Use Never User      E-Cigarette/Vaping Substances     Social History     Tobacco Use    Smoking status: Every Day     Current packs/day: 0.50     Average packs/day: 0.5 packs/day for 40.0 years (20.0 ttl pk-yrs)     Types: Cigarettes    Smokeless tobacco: Never   Vaping Use    Vaping status: Never Used   Substance Use Topics    Alcohol use: Yes    Drug use: No       Review of Systems   Constitutional:  Negative for chills.   Gastrointestinal:  Negative for abdominal pain and nausea.   Genitourinary:  Positive for hematuria. Negative for dysuria, flank pain and incomplete emptying.       Physical Exam  Physical Exam  Vitals and nursing note reviewed.   Constitutional:       General: He is not in acute distress.     Appearance: He is  normal weight. He is not ill-appearing or toxic-appearing.   HENT:      Head: Normocephalic and atraumatic.      Right Ear: External ear normal.      Left Ear: External ear normal.      Nose: Nose normal.      Mouth/Throat:      Mouth: Mucous membranes are moist.   Pulmonary:      Effort: Pulmonary effort is normal. No respiratory distress.   Abdominal:      General: Abdomen is flat. There is no distension.      Palpations: There is no mass.      Tenderness: There is no abdominal tenderness. There is no right CVA tenderness, left CVA tenderness, guarding or rebound.      Comments: Colostomy   Musculoskeletal:         General: No signs of injury.   Skin:     Coloration: Skin is not pale.   Neurological:      General: No focal deficit present.      Mental Status: He is alert.   Psychiatric:         Mood and Affect: Mood normal.         Thought Content: Thought content normal.         Judgment: Judgment normal.         Vital Signs  ED Triage Vitals   Temperature Pulse Respirations Blood Pressure SpO2   03/16/24 1233 03/16/24 1233 03/16/24 1233 03/16/24 1233 03/16/24 1233   (!) 97.3 °F (36.3 °C) 69 18 (!) 200/84 98 %      Temp src Heart Rate Source Patient Position - Orthostatic VS BP Location FiO2 (%)   -- 03/16/24 1539 03/16/24 1539 03/16/24 1539 --    Monitor Sitting Right arm       Pain Score       03/16/24 1233       No Pain           Vitals:    03/16/24 1233 03/16/24 1258 03/16/24 1539   BP: (!) 200/84 (!) 194/86 (!) 216/95   Pulse: 69  66   Patient Position - Orthostatic VS:   Sitting         Visual Acuity      ED Medications  Medications   lidocaine (URO-JET) 2 % urethral/mucosal gel 1 Application (1 Application Urethral Given 3/16/24 1321)       Diagnostic Studies  Results Reviewed       Procedure Component Value Units Date/Time    Urine Microscopic [[id number]]  (Abnormal) Collected: 03/16/24 1350    Lab Status: Final result Specimen: Urine, Clean Catch Updated: 03/16/24 1403     RBC, UA Innumerable /hpf       WBC, UA 2-4 /hpf      Epithelial Cells Occasional /hpf      Bacteria, UA Occasional /hpf     UA w Reflex to Microscopic w Reflex to Culture [[id number]]  (Abnormal) Collected: 03/16/24 1350    Lab Status: Final result Specimen: Urine, Clean Catch Updated: 03/16/24 1355     Color, UA Yellow     Clarity, UA Clear     Specific Gravity, UA 1.020     pH, UA 6.5     Leukocytes, UA Negative     Nitrite, UA Negative     Protein, UA 30 (1+) mg/dl      Glucose, UA Negative mg/dl      Ketones, UA Negative mg/dl      Urobilinogen, UA 0.2 E.U./dl      Bilirubin, UA Small     Occult Blood, UA Large    Basic metabolic panel [[id number]]  (Abnormal) Collected: 03/16/24 1318    Lab Status: Final result Specimen: Blood from Arm, Left Updated: 03/16/24 1351     Sodium 135 mmol/L      Potassium 4.5 mmol/L      Chloride 101 mmol/L      CO2 26 mmol/L      ANION GAP 8 mmol/L      BUN 32 mg/dL      Creatinine 1.91 mg/dL      Glucose 98 mg/dL      Calcium 9.6 mg/dL      eGFR 29 ml/min/1.73sq m     Narrative:      National Kidney Disease Foundation guidelines for Chronic Kidney Disease (CKD):     Stage 1 with normal or high GFR (GFR > 90 mL/min/1.73 square meters)    Stage 2 Mild CKD (GFR = 60-89 mL/min/1.73 square meters)    Stage 3A Moderate CKD (GFR = 45-59 mL/min/1.73 square meters)    Stage 3B Moderate CKD (GFR = 30-44 mL/min/1.73 square meters)    Stage 4 Severe CKD (GFR = 15-29 mL/min/1.73 square meters)    Stage 5 End Stage CKD (GFR <15 mL/min/1.73 square meters)  Note: GFR calculation is accurate only with a steady state creatinine    CBC and differential [[id number]]  (Abnormal) Collected: 03/16/24 1318    Lab Status: Final result Specimen: Blood from Arm, Left Updated: 03/16/24 1337     WBC 7.71 Thousand/uL      RBC 3.28 Million/uL      Hemoglobin 10.7 g/dL      Hematocrit 32.6 %      MCV 99 fL      MCH 32.6 pg      MCHC 32.8 g/dL      RDW 12.9 %      MPV 10.3 fL      Platelets 148 Thousands/uL      nRBC 0 /100 WBCs      Neutrophils  Relative 41 %      Immature Grans % 0 %      Lymphocytes Relative 42 %      Monocytes Relative 9 %      Eosinophils Relative 7 %      Basophils Relative 1 %      Neutrophils Absolute 3.13 Thousands/µL      Absolute Immature Grans 0.03 Thousand/uL      Absolute Lymphocytes 3.29 Thousands/µL      Absolute Monocytes 0.66 Thousand/µL      Eosinophils Absolute 0.52 Thousand/µL      Basophils Absolute 0.08 Thousands/µL                    No orders to display              Procedures  Procedures         ED Course  ED Course as of 03/16/24 1605   Sat Mar 16, 2024   1501 CBI infusing.   1533 Urine now appears clear.   1534 Will plan to discharge to home.  Restart aspirin as patient is a stroke risk.  Will keep patient off Eliquis at this time.  Follow-up with urology.  Consult placed for same.   1601 Patient has been observed with no recurrent bleeding of significance.  Stable for discharge.  Discussed with urology.  They will follow-up in the office.                                             Medical Decision Making  Problems Addressed:  Hematuria: undiagnosed new problem with uncertain prognosis  Paroxysmal atrial fibrillation (HCC): chronic illness or injury    Amount and/or Complexity of Data Reviewed  Labs: ordered.  Discussion of management or test interpretation with external provider(s): Coordinated care with family and urology.    Risk  OTC drugs.  Prescription drug management.             Disposition  Final diagnoses:   Hematuria   Paroxysmal atrial fibrillation (HCC)     Time reflects when diagnosis was documented in both MDM as applicable and the Disposition within this note       Time User Action Codes Description Comment    3/16/2024  3:33 PM Mu Aguilera Add [R31.9] Hematuria     3/16/2024  4:02 PM Mu Aguilera Add [I48.0] Paroxysmal atrial fibrillation (HCC)           ED Disposition       ED Disposition   Discharge    Condition   Stable    Date/Time   Sat Mar 16, 2024  4:03 PM    Carole Ley  Enid Lipscomb discharge to home/self care.                   Follow-up Information       Follow up With Specialties Details Why Contact Info Additional Information    [facility] [place] [facility] In 1 week  [address]  [address] [place] [id number] [phone] [facility] [place], [address], [place], [address] [phone]            Patient's Medications   Discharge Prescriptions    No medications on file           PDMP Review       None            ED Provider  Electronically Signed by             Mu Aguilera DO  03/16/24 9953

## 2024-03-18 ENCOUNTER — TELEPHONE (OUTPATIENT)
Age: 89
End: 2024-03-18

## 2024-03-18 NOTE — TELEPHONE ENCOUNTER
Pt daughter called in stating that the patient is still having blood in his urine. He was put on eliquis and baby aspirin, which did not help with the bleeding. Pt daughter states patient was put on Lipitor  and she was reading that it can cause unusual bleeding. Daughter is calling to find out if she can take patient off Lipitor to see if that is the cause of the bleeding. She states patient was just in the ER on 3/16. Did scheduled ER follow up on 3/21. Daughter would still like a call back to advise about Lipitor. Please advise.

## 2024-03-21 ENCOUNTER — OFFICE VISIT (OUTPATIENT)
Dept: FAMILY MEDICINE CLINIC | Facility: CLINIC | Age: 89
End: 2024-03-21
Payer: MEDICARE

## 2024-03-21 VITALS
HEART RATE: 70 BPM | SYSTOLIC BLOOD PRESSURE: 126 MMHG | WEIGHT: 136 LBS | DIASTOLIC BLOOD PRESSURE: 62 MMHG | HEIGHT: 69 IN | OXYGEN SATURATION: 96 % | BODY MASS INDEX: 20.14 KG/M2

## 2024-03-21 DIAGNOSIS — R31.0 GROSS HEMATURIA: Primary | ICD-10-CM

## 2024-03-21 DIAGNOSIS — N18.31 CHRONIC RENAL FAILURE, STAGE 3A (HCC): ICD-10-CM

## 2024-03-21 DIAGNOSIS — I48.0 PAROXYSMAL ATRIAL FIBRILLATION (HCC): Chronic | ICD-10-CM

## 2024-03-21 DIAGNOSIS — Z93.3 COLOSTOMY PRESENT (HCC): Chronic | ICD-10-CM

## 2024-03-21 DIAGNOSIS — D69.6 PLATELETS DECREASED (HCC): ICD-10-CM

## 2024-03-21 DIAGNOSIS — J30.89 NON-SEASONAL ALLERGIC RHINITIS DUE TO OTHER ALLERGIC TRIGGER: ICD-10-CM

## 2024-03-21 DIAGNOSIS — I95.1 ORTHOSTATIC HYPOTENSION: ICD-10-CM

## 2024-03-21 PROCEDURE — 99214 OFFICE O/P EST MOD 30 MIN: CPT | Performed by: FAMILY MEDICINE

## 2024-03-21 PROCEDURE — G2211 COMPLEX E/M VISIT ADD ON: HCPCS | Performed by: FAMILY MEDICINE

## 2024-03-21 RX ORDER — AZELASTINE 1 MG/ML
1 SPRAY, METERED NASAL 2 TIMES DAILY
Qty: 30 ML | Refills: 1 | Status: SHIPPED | OUTPATIENT
Start: 2024-03-21

## 2024-03-21 NOTE — PATIENT INSTRUCTIONS
Discussed the problem with the blood in the urine.  This does seem to have settled and is now back on the aspirin and I feel may continue but if the blood starts again stop the aspirin.  Does not need to be on the Eliquis and does appear in sinus rhythm at this time and I also am okay with stopping the Lipitor.  Will make referral for urology for follow-up for the hematuria and continue other meds as it is

## 2024-03-21 NOTE — ASSESSMENT & PLAN NOTE
Reviewed the ER reports.  Is no longer having blood in the urine but I do feel needs to follow-up with urology and will make referral.  He is back on the aspirin at this time but if the bleeding reoccurs should stop this

## 2024-03-21 NOTE — ASSESSMENT & PLAN NOTE
Appears in sinus rhythm at this time.  Would not restart the Eliquis.  Note does follow-up with cardiology

## 2024-03-21 NOTE — ASSESSMENT & PLAN NOTE
Seems better at this time may continue current regimen.  May stop wearing the compression stockings but if this reoccurs should restart

## 2024-03-21 NOTE — PROGRESS NOTES
Name: Av Rossi Jr.      : 1930      MRN: [id number]  Encounter Provider: Nba Sweet MD  Encounter Date: 3/21/2024   Encounter department: [facility] PRIMARY CARE    Assessment & Plan     1. Gross hematuria  Assessment & Plan:  Reviewed the ER reports.  Is no longer having blood in the urine but I do feel needs to follow-up with urology and will make referral.  He is back on the aspirin at this time but if the bleeding reoccurs should stop this    Orders:  -     Ambulatory Referral to Urology; Future; Expected date: 2024    2. Platelets decreased ([facility])    3. Chronic renal failure, stage 3a ([facility])    4. Non-seasonal allergic rhinitis due to other allergic trigger  Comments:  Gave refill for the Astelin to use twice a day  Orders:  -     azelastine (ASTELIN) 0.1 % nasal spray; 1 spray into each nostril 2 (two) times a day Use in each nostril as directed    5. Paroxysmal atrial fibrillation ([facility])  Assessment & Plan:  Appears in sinus rhythm at this time.  Would not restart the Eliquis.  Note does follow-up with cardiology      6. Orthostatic hypotension  Assessment & Plan:  Seems better at this time may continue current regimen.  May stop wearing the compression stockings but if this reoccurs should restart      7. Colostomy present ([facility])           Subjective          Patient has history of hypertension, rectal carcinoma, functional colostomy, allergic rhinitis, paroxysmal atrial fibrillation and urinary incontinence had been hospitalized for paroxysmal atrial fibrillation with lightheadedness.  Was placed on Metroprolol 50 mg twice a day as well as Plavix and digoxin every other day instead of daily.  Was hypotensive and went back to the hospital.  The metoprolol was decreased to 12.5 mg at bedtime.  Plavix was discontinued and Eliquis 2.5 mg was started.  He was also placed on amiodarone and digoxin was stopped.  Had 2 ER visits for problems with gross hematuria  the Eliquis and aspirin were stopped and over the last couple days the hematuria has remitted.  Was having a lot of muscle pain whenever walking when taking the Lipitor which seem to go away after several days after stopping.  Has been wearing compression stockings and has question if needs to continue      Review of Systems   Constitutional:  Negative for activity change, appetite change, chills, fatigue, fever and unexpected weight change.   HENT:  Negative for congestion, rhinorrhea and trouble swallowing.    Eyes:  Negative for visual disturbance.   Respiratory:  Negative for apnea, cough, chest tightness and shortness of breath.    Cardiovascular:  Negative for chest pain, palpitations and leg swelling.   Gastrointestinal:  Negative for abdominal distention, abdominal pain, constipation and diarrhea.   Endocrine: Negative for polyuria (Nocturia x 1-2).   Genitourinary:  Negative for difficulty urinating and hematuria (This seems to have resolved).   Musculoskeletal:  Negative for arthralgias and myalgias.   Skin:  Negative for rash.   Allergic/Immunologic: Negative for environmental allergies.   Neurological:  Negative for dizziness, weakness, light-headedness, numbness and headaches.   Hematological:  Negative for adenopathy.   Psychiatric/Behavioral:  Negative for agitation and sleep disturbance.        Current Outpatient Medications on File Prior to Visit   Medication Sig    amiodarone 100 mg tablet Take 100 mg by mouth daily    aspirin (ECOTRIN LOW STRENGTH) 81 mg EC tablet Take 1 tablet (81 mg total) by mouth daily    docusate sodium (COLACE) 100 mg capsule Take 1 capsule (100 mg total) by mouth every 12 (twelve) hours    montelukast (SINGULAIR) 10 mg tablet Take 1 tablet (10 mg total) by mouth daily at bedtime    Multiple Vitamin (MULTIVITAMIN) capsule Take 1 capsule by mouth daily    multivitamin (THERAGRAN) TABS Take 1 tablet by mouth daily    nitroglycerin (NITROSTAT) 0.4 mg SL tablet Place 1 tablet  "under the tongue every 5 (five) minutes as needed    Ostomy Supplies (New Image Closed 2-3/4\") Pouch MISC USE AS DIRECTED BY YOUR PHYSICIAN.    Ostomy Supplies (New Image Flat Barrier 70MM) WAFR USE AS DIRECTED BY YOUR PHYSICIAN.    predniSONE 20 mg tablet Take 40 mg by mouth daily    atorvastatin (LIPITOR) 20 mg tablet Take 1 tablet (20 mg total) by mouth daily (Patient not taking: Reported on 3/21/2024)    metoprolol succinate (TOPROL-XL) 25 mg 24 hr tablet Take 0.5 tablets (12.5 mg total) by mouth daily at bedtime (Patient not taking: Reported on 3/21/2024)    metoprolol tartrate (LOPRESSOR) 50 mg tablet Take 50 mg by mouth every 12 (twelve) hours (Patient not taking: Reported on 3/21/2024)       Objective     Blood Pressure 126/62 (BP Location: Left arm, Patient Position: Sitting, Cuff Size: Standard)   Pulse 70   Height 5' 9\" (1.753 m)   Weight 61.7 kg (136 lb)   Oxygen Saturation 96%   Body Mass Index 20.08 kg/m²     Physical Exam  Vitals and nursing note reviewed.   Constitutional:       Appearance: Normal appearance. He is underweight.   HENT:      Head: Normocephalic.      Nose: Nose normal.   Eyes:      Conjunctiva/sclera: Conjunctivae normal.   Neck:      Thyroid: No thyromegaly.      Vascular: No carotid bruit.   Cardiovascular:      Rate and Rhythm: Normal rate and regular rhythm.      Heart sounds: Murmur (Soft systolic murmur at left sternal border heart rate is 72.  Heart sounds are distinct) heard.   Pulmonary:      Effort: Pulmonary effort is normal.      Breath sounds: Normal breath sounds.   Abdominal:      General: Abdomen is flat. There is no distension.      Palpations: Abdomen is soft. There is no mass.      Tenderness: There is no abdominal tenderness.   Musculoskeletal:         General: Normal range of motion.      Cervical back: Normal range of motion and neck supple. No tenderness.      Right lower leg: No edema.      Left lower leg: No edema.   Lymphadenopathy:      Cervical: No " cervical adenopathy.   Skin:     General: Skin is warm and dry.      Findings: No rash.   Neurological:      Mental Status: He is alert and oriented to person, place, and time.      Motor: No weakness.      Coordination: Coordination normal.      Gait: Gait abnormal.   Psychiatric:         Mood and Affect: Mood normal.       Nba Sweet MD

## 2024-03-29 ENCOUNTER — TELEPHONE (OUTPATIENT)
Dept: CARDIOLOGY CLINIC | Facility: CLINIC | Age: 89
End: 2024-03-29

## 2024-04-11 ENCOUNTER — TELEPHONE (OUTPATIENT)
Age: 89
End: 2024-04-11

## 2024-04-11 ENCOUNTER — OFFICE VISIT (OUTPATIENT)
Dept: FAMILY MEDICINE CLINIC | Facility: CLINIC | Age: 89
End: 2024-04-11
Payer: MEDICARE

## 2024-04-11 VITALS
HEART RATE: 65 BPM | OXYGEN SATURATION: 97 % | WEIGHT: 137 LBS | SYSTOLIC BLOOD PRESSURE: 122 MMHG | BODY MASS INDEX: 20.29 KG/M2 | HEIGHT: 69 IN | DIASTOLIC BLOOD PRESSURE: 76 MMHG

## 2024-04-11 DIAGNOSIS — R31.0 GROSS HEMATURIA: ICD-10-CM

## 2024-04-11 DIAGNOSIS — E44.1 MILD PROTEIN-CALORIE MALNUTRITION (HCC): ICD-10-CM

## 2024-04-11 DIAGNOSIS — I48.0 PAROXYSMAL ATRIAL FIBRILLATION (HCC): Chronic | ICD-10-CM

## 2024-04-11 DIAGNOSIS — I95.1 ORTHOSTATIC HYPOTENSION: Primary | ICD-10-CM

## 2024-04-11 DIAGNOSIS — J30.89 NON-SEASONAL ALLERGIC RHINITIS, UNSPECIFIED TRIGGER: Chronic | ICD-10-CM

## 2024-04-11 PROCEDURE — 99214 OFFICE O/P EST MOD 30 MIN: CPT | Performed by: FAMILY MEDICINE

## 2024-04-11 PROCEDURE — G2211 COMPLEX E/M VISIT ADD ON: HCPCS | Performed by: FAMILY MEDICINE

## 2024-04-11 NOTE — PATIENT INSTRUCTIONS
Overall doing well but is occasionally getting some bloody like material in the urine and I do feel should check with urology for this.  Will continue all meds as this and try to push walking activity to maintain leg strength.  The cramping in the legs will not injure the legs and should go away after 3 to 5 minutes of resting with the more walking that is done should increase the time before the cramping starts

## 2024-04-11 NOTE — ASSESSMENT & PLAN NOTE
Malnutrition Findings:continue to push basic nutrition                                BMI Findings:           Body mass index is 20.23 kg/m².

## 2024-04-11 NOTE — ASSESSMENT & PLAN NOTE
Discussed problem.  I do feel would be a good idea to check with urology.  They had gotten a call but said they were not ready to do this.  I advised him to call back and set this up

## 2024-04-11 NOTE — TELEPHONE ENCOUNTER
Reason for call:     Patient daughter calling for a new script for Ostomy paste for her father.    [x] Refill   [] Prior Auth  [] Other:     Office:   [x] PCP/Provider - Dr Wilkinson  [] Specialty/Provider -     Medication:   Ostomy paste    Pharmacy:   [facility] [place]

## 2024-04-11 NOTE — PROGRESS NOTES
Name: Av Rossi Jr.      : 1930      MRN: [id number]  Encounter Provider: Nba Sweet MD  Encounter Date: 2024   Encounter department: [facility] PRIMARY CARE    Assessment & Plan     1. Orthostatic hypotension  Assessment & Plan:  Seems to be doing better with this and is no longer wearing the compression stockings      2. Paroxysmal atrial fibrillation (HCC)  Assessment & Plan:  Seems to be in sinus rhythm at this time.  Continue current regimen      3. Mild protein-calorie malnutrition (HCC)  Assessment & Plan:  Malnutrition Findings:continue to push basic nutrition                                BMI Findings:           Body mass index is 20.23 kg/m².         4. Non-seasonal allergic rhinitis, unspecified trigger  Assessment & Plan:  Seems to be doing better.  Continue current regimen      5. Gross hematuria  Assessment & Plan:  Discussed problem.  I do feel would be a good idea to check with urology.  They had gotten a call but said they were not ready to do this.  I advised him to call back and set this up             Subjective        Patient has history of hypertension, rectal carcinoma, functional colostomy, allergic rhinitis, paroxysmal atrial fibrillation and urinary incontinence had been hospitalized for paroxysmal atrial fibrillation with lightheadedness.  Was placed on Metroprolol 50 mg twice a day as well as Plavix and digoxin every other day instead of daily.  Was hypotensive and went back to the hospital.  The metoprolol was decreased to 12.5 mg at bedtime.  Plavix was discontinued and Eliquis 2.5 mg was started.  He was also placed on amiodarone and digoxin was stopped.  Had 2 ER visits for problems with gross hematuria the Eliquis and aspirin were stopped and over the last couple days the hematuria has remitted but is now intermittently back.  Had stopped the Lipitor which he felt helped with the cramping in the legs although he feels this is  "back now when he walks      Review of Systems   Constitutional:  Negative for activity change, appetite change, chills and fever.   HENT:  Negative for congestion and ear pain.    Eyes:  Negative for pain and visual disturbance.   Respiratory:  Negative for cough, chest tightness and shortness of breath.    Cardiovascular:  Negative for chest pain, palpitations and leg swelling.   Gastrointestinal:  Negative for abdominal pain, constipation, diarrhea and vomiting.   Endocrine: Positive for polyuria.   Genitourinary:  Positive for hematuria. Negative for difficulty urinating and enuresis.   Musculoskeletal:  Negative for arthralgias and back pain.   Skin:  Negative for color change and rash.   Allergic/Immunologic: Positive for environmental allergies.   Neurological:  Negative for dizziness, seizures, syncope and light-headedness.   Psychiatric/Behavioral:  Negative for sleep disturbance.    All other systems reviewed and are negative.      Current Outpatient Medications on File Prior to Visit   Medication Sig    amiodarone 100 mg tablet Take 100 mg by mouth daily    aspirin (ECOTRIN LOW STRENGTH) 81 mg EC tablet Take 1 tablet (81 mg total) by mouth daily    azelastine (ASTELIN) 0.1 % nasal spray 1 spray into each nostril 2 (two) times a day Use in each nostril as directed    docusate sodium (COLACE) 100 mg capsule Take 1 capsule (100 mg total) by mouth every 12 (twelve) hours    montelukast (SINGULAIR) 10 mg tablet Take 1 tablet (10 mg total) by mouth daily at bedtime    Multiple Vitamin (MULTIVITAMIN) capsule Take 1 capsule by mouth daily    multivitamin (THERAGRAN) TABS Take 1 tablet by mouth daily    nitroglycerin (NITROSTAT) 0.4 mg SL tablet Place 1 tablet under the tongue every 5 (five) minutes as needed    Ostomy Supplies (New Image Closed 2-3/4\") Pouch MISC USE AS DIRECTED BY YOUR PHYSICIAN.    Ostomy Supplies (New Image Flat Barrier 70MM) WAFR USE AS DIRECTED BY YOUR PHYSICIAN.    predniSONE 20 mg tablet " "Take 40 mg by mouth daily    atorvastatin (LIPITOR) 20 mg tablet Take 1 tablet (20 mg total) by mouth daily (Patient not taking: Reported on 3/21/2024)    metoprolol succinate (TOPROL-XL) 25 mg 24 hr tablet Take 0.5 tablets (12.5 mg total) by mouth daily at bedtime (Patient not taking: Reported on 3/21/2024)    metoprolol tartrate (LOPRESSOR) 50 mg tablet Take 50 mg by mouth every 12 (twelve) hours (Patient not taking: Reported on 3/21/2024)       Objective     Blood Pressure 122/76 (BP Location: Left arm, Patient Position: Sitting, Cuff Size: Large)   Pulse 65   Height 5' 9\" (1.753 m)   Weight 62.1 kg (137 lb)   Oxygen Saturation 97%   Body Mass Index 20.23 kg/m²     Physical Exam  Vitals and nursing note reviewed.   Constitutional:       Appearance: Normal appearance. He is well-developed.   HENT:      Head: Normocephalic.      Nose: Nose normal.   Eyes:      Conjunctiva/sclera: Conjunctivae normal.   Neck:      Thyroid: No thyromegaly.      Vascular: No carotid bruit.   Cardiovascular:      Rate and Rhythm: Normal rate and regular rhythm.      Heart sounds: Murmur (Soft systolic murmur at left sternal border.  Heart rate is 72.  Heart sounds are distinct) heard.   Pulmonary:      Effort: Pulmonary effort is normal.      Breath sounds: Normal breath sounds.   Abdominal:      General: Abdomen is flat. There is no distension (Has functioning colostomy).      Palpations: Abdomen is soft. There is no mass.      Tenderness: There is no abdominal tenderness.   Musculoskeletal:         General: Normal range of motion.      Cervical back: Normal range of motion and neck supple. No tenderness.      Right lower leg: No edema.      Left lower leg: No edema.   Lymphadenopathy:      Cervical: No cervical adenopathy.   Skin:     General: Skin is warm and dry.      Findings: No rash.   Neurological:      Mental Status: He is alert and oriented to person, place, and time.      Motor: No weakness.      Coordination: " Coordination normal.      Gait: Gait abnormal.      Deep Tendon Reflexes: Reflexes normal.   Psychiatric:         Mood and Affect: Mood normal.       Nba Sweet MD

## 2024-04-11 NOTE — TELEPHONE ENCOUNTER
03/08/24 9:06 AM    Patient contacted post ED visit, phone outreaches were unsuccessful; patient does not have MyChart, a MyChart letter has not been sent.     Thank you.  Nithin Thurman  [facility]       Reached patient for medication adherence consult. Patient overdue for refill on atorvastatin per insurance report.     Patient reports that she is taking her medication as prescribed. She reports tolerating her medication well. She endorses the need for refill. Will pend order for PCP to review.    Provided education on importance of adherence. Patient denies any questions or concerns with medication.

## 2024-04-13 NOTE — CASE MANAGEMENT
Case Management Assessment & Discharge Planning Note    Patient name Patsy Rodriguez. Location /-01 MRN [id number]  : 1930 Date 2023       Current Admission Date: 2023  Current Admission Diagnosis:Vasovagal syncope   Patient Active Problem List    Diagnosis Date Noted    Vasovagal syncope 2023    H/O non-ST elevation myocardial infarction (NSTEMI) 2023    Chronic renal failure, stage 3a ([address]) 06/15/2021    Mild protein-calorie malnutrition ([address]) 2021    Orthostatic hypotension 2019    Paroxysmal atrial fibrillation ([address]) 2019    Rectal carcinoma ([address]) 2019    Colostomy present ([address]) 2019    Non-seasonal allergic rhinitis 2019    Mixed stress and urge urinary incontinence 2019      LOS (days): 1  Geometric Mean LOS (GMLOS) (days): 2.40  Days to GMLOS:1.4     OBJECTIVE:    Risk of Unplanned Readmission Score: 18.02         Current admission status: Inpatient       Preferred Pharmacy:   [address] , [address] [facility] [address]  Phone: [phone] Fax: [phone] [address] [place] [address], [address] [facility] [address]  Phone: [phone] Fax: [phone] [address] [place] [address] [address] [place], [address] [facility]  [address] [facility] [address]  Phone: [phone] Fax: [phone] [facility] [address] [facility], [address] [facility]  [address] [place] [address]  Phone: [phone] Fax: [phone] [address] [place] [address]  Phone: [phone] Fax: [phone]    Primary Care Provider: Elvira Elliott MD    Primary Insurance: MEDICARE  Secondary Insurance: AARP    ASSESSMENT:  [place] Proxies    There are no active Health Care Proxies on file.                       Patient Information  Admitted from[de-identified] Home  Mental Status: Alert  During Assessment patient was accompanied by: Not accompanied during assessment  Assessment information provided by[de-identified] Patient  Primary Caregiver: Child  Caregiver's Name[de-identified] daughterRadha Relationship to Patient[de-identified] Family Member  Support Systems: Daughter, Family members  [facility]: Ira Lucas  entry access options. Select all that apply.: Stairs  Number of steps to enter home.: 1  Type of Current Residence: 2 story home  Upon entering residence, is there a bedroom on the main floor (no further steps)?: No  A bedroom is located on the following floor levels of residence (select all that apply):: 2nd Floor  Upon entering residence, is there a bathroom on the main floor (no further steps)?: Yes (1/2 bath)  Number of steps to 2nd floor from main floor: One Flight  Living Arrangements: Lives w/ Daughter    Activities of Daily Living Prior to Admission  Functional Status: Independent  Completes ADLs independently?: Yes  Ambulates independently?: Yes  Does patient use assisted devices?: Yes  Assisted Devices (DME) used: [employer]  Does patient currently own DME?: Yes  What DME does the patient currently own?: [employer]  Does patient have a history of Outpatient Therapy (PT/OT)?: No  Does the patient have a history of Short-Term Rehab?: No  Does patient have a history of HHC?: No  Does patient currently have [address]?: No         Patient Information Continued  Does patient have prescription coverage?: Yes  Does patient receive dialysis treatments?: No  Does patient have a history of substance abuse?: No  Does patient have a history of Mental Health Diagnosis?: No         Means of Transportation  Means of Transport to Appts[de-identified] Drives Self      Housing Stability: Not on file   Food Insecurity: Not on file   Transportation Needs: No Transportation Needs (1/3/2023)    PRAPARE - Transportation     Lack of Transportation (Medical): No     Lack of Transportation (Non-Medical):  No Utilities: Not on file       DISCHARGE DETAILS:    Discharge planning discussed with[de-identified] patient  Freedom of Choice: Yes                   Contacts  Patient Contacts: daughter, Catrachito Atkins  Relationship to Patient[de-identified] [facility]          Is the patient interested in [address] [place] [address] East at discharge?: Yes  [facility] requested[de-identified] Nursing, Occupational Therapy, Physical [facility] Provider[de-identified] PCP  Home Health Services Needed[de-identified] Evaluate Functional Status and Safety, Gait/ADL Training, Strengthening/Theraputic Exercises to Improve Function, Other (comment) (medication management)  Homebound Criteria Met[de-identified] Immunosuppressed  Supporting Clincal Findings[de-identified] Limited Endurance Spontaneous, unlabored and symmetrical

## 2024-05-24 ENCOUNTER — OFFICE VISIT (OUTPATIENT)
Dept: URGENT CARE | Facility: CLINIC | Age: 89
End: 2024-05-24
Payer: MEDICARE

## 2024-05-24 VITALS
TEMPERATURE: 97.2 F | RESPIRATION RATE: 18 BRPM | HEIGHT: 69 IN | HEART RATE: 79 BPM | BODY MASS INDEX: 21.03 KG/M2 | DIASTOLIC BLOOD PRESSURE: 76 MMHG | OXYGEN SATURATION: 98 % | SYSTOLIC BLOOD PRESSURE: 182 MMHG | WEIGHT: 142 LBS

## 2024-05-24 DIAGNOSIS — L03.116 CELLULITIS OF LEFT FOOT: Primary | ICD-10-CM

## 2024-05-24 PROCEDURE — 99213 OFFICE O/P EST LOW 20 MIN: CPT | Performed by: PHYSICIAN ASSISTANT

## 2024-05-24 PROCEDURE — G0463 HOSPITAL OUTPT CLINIC VISIT: HCPCS | Performed by: PHYSICIAN ASSISTANT

## 2024-05-24 RX ORDER — SULFAMETHOXAZOLE AND TRIMETHOPRIM 800; 160 MG/1; MG/1
1 TABLET ORAL EVERY 12 HOURS SCHEDULED
Qty: 20 TABLET | Refills: 0 | Status: SHIPPED | OUTPATIENT
Start: 2024-05-24 | End: 2024-06-03

## 2024-05-24 NOTE — PROGRESS NOTES
[facility]        NAME: Av Rossi Jr. is a 89 y.o. male  : 1930    MRN: [id number]  DATE: May 24, 2024  TIME: 12:21 PM    Assessment and Plan   Cellulitis of left foot [L03.116]  1. Cellulitis of left foot  sulfamethoxazole-trimethoprim (BACTRIM DS) 800-160 mg per tablet    Ambulatory Referral to Podiatry            Patient Instructions       Follow up with PCP in 3-5 days.  Proceed to  ER if symptoms worsen.    If tests have been performed at [facility], our office will contact you with results if changes need to be made to the care plan discussed with you at the visit.  You can review your full results on [facility].    Chief Complaint     Chief Complaint   Patient presents with    Foot Problem     Noticed left foot redness this morning. Open area on 3rd digit.         History of Present Illness       Patient is a 89 year old male presenting to [facility] with left foot redness and open region.  Patient is accompanied by daughter in room.  There is an open area on 3rd digit.  Patient denies fever.  Redness began this morning.  Pt attempted to rip the nail this morning.        Review of Systems   Review of Systems   Constitutional:  Negative for chills and fever.   HENT:  Negative for ear pain and sore throat.    Eyes:  Negative for pain and visual disturbance.   Respiratory:  Negative for cough and shortness of breath.    Cardiovascular:  Negative for chest pain and palpitations.   Gastrointestinal:  Negative for abdominal pain and vomiting.   Genitourinary:  Negative for dysuria and hematuria.   Musculoskeletal:  Negative for arthralgias and back pain.   Skin:  Positive for rash and wound. Negative for color change.   Neurological:  Negative for seizures and syncope.   All other systems reviewed and are negative.        Current Medications       Current Outpatient Medications:     amiodarone 100 mg tablet, Take 100 mg by mouth daily, Disp: , Rfl:     aspirin (ECOTRIN LOW STRENGTH) 81  "mg EC tablet, Take 1 tablet (81 mg total) by mouth daily, Disp: 30 tablet, Rfl: 0    docusate sodium (COLACE) 100 mg capsule, Take 1 capsule (100 mg total) by mouth every 12 (twelve) hours, Disp: 60 capsule, Rfl: 0    metoprolol succinate (TOPROL-XL) 25 mg 24 hr tablet, Take 0.5 tablets (12.5 mg total) by mouth daily at bedtime, Disp: 45 tablet, Rfl: 1    montelukast (SINGULAIR) 10 mg tablet, Take 1 tablet (10 mg total) by mouth daily at bedtime, Disp: 90 tablet, Rfl: 1    Multiple Vitamin (MULTIVITAMIN) capsule, Take 1 capsule by mouth daily, Disp: , Rfl:     nitroglycerin (NITROSTAT) 0.4 mg SL tablet, Place 1 tablet under the tongue every 5 (five) minutes as needed, Disp: , Rfl:     sulfamethoxazole-trimethoprim (BACTRIM DS) 800-160 mg per tablet, Take 1 tablet by mouth every 12 (twelve) hours for 10 days, Disp: 20 tablet, Rfl: 0    atorvastatin (LIPITOR) 20 mg tablet, Take 1 tablet (20 mg total) by mouth daily (Patient not taking: Reported on 3/21/2024), Disp: 30 tablet, Rfl: 5    azelastine (ASTELIN) 0.1 % nasal spray, 1 spray into each nostril 2 (two) times a day Use in each nostril as directed (Patient not taking: Reported on 5/24/2024), Disp: 30 mL, Rfl: 1    metoprolol tartrate (LOPRESSOR) 50 mg tablet, Take 50 mg by mouth every 12 (twelve) hours (Patient not taking: Reported on 3/21/2024), Disp: , Rfl:     multivitamin (THERAGRAN) TABS, Take 1 tablet by mouth daily (Patient not taking: Reported on 5/24/2024), Disp: , Rfl:     Ostomy Supplies (New Image Closed 2-3/4\") Pouch MISC, USE AS DIRECTED BY YOUR PHYSICIAN., Disp: 30 each, Rfl: 5    Ostomy Supplies (New Image Flat Barrier 70MM) WAFR, USE AS DIRECTED BY YOUR PHYSICIAN., Disp: 30 Wafer, Rfl: 5    predniSONE 20 mg tablet, Take 40 mg by mouth daily (Patient not taking: Reported on 5/24/2024), Disp: , Rfl:     Current Allergies     Allergies as of 05/24/2024    (No Known Allergies)            The following portions of the patient's history were reviewed " "and updated as appropriate: allergies, current medications, past family history, past medical history, past social history, past surgical history and problem list.     Past Medical History:   Diagnosis Date    Atrial fibrillation (HCC)     Colorectal cancer (HCC)     HL (hearing loss)     Hypertension     Pneumonia     Rectal carcinoma (HCC) 7/31/2019       Past Surgical History:   Procedure Laterality Date    COLECTOMY      COLOSTOMY         Family History   Problem Relation Age of Onset    Heart disease Mother     Heart disease Father          Medications have been verified.        Objective   BP (!) 182/76   Pulse 79   Temp (!) 97.2 °F (36.2 °C)   Resp 18   Ht 5' 9\" (1.753 m)   Wt 64.4 kg (142 lb)   SpO2 98%   BMI 20.97 kg/m²   No LMP for male patient.       Physical Exam     Physical Exam  Constitutional:       Appearance: Normal appearance. He is normal weight.   HENT:      Head: Normocephalic and atraumatic.      Nose: Nose normal.      Mouth/Throat:      Mouth: Mucous membranes are moist.   Eyes:      Extraocular Movements: Extraocular movements intact.      Conjunctiva/sclera: Conjunctivae normal.      Pupils: Pupils are equal, round, and reactive to light.   Cardiovascular:      Rate and Rhythm: Normal rate.   Pulmonary:      Effort: Pulmonary effort is normal.   Musculoskeletal:         General: Normal range of motion.      Cervical back: Normal range of motion and neck supple.        Feet:    Skin:     General: Skin is warm and dry.   Neurological:      General: No focal deficit present.      Mental Status: He is alert and oriented to person, place, and time.   Psychiatric:         Mood and Affect: Mood normal.         Behavior: Behavior normal.                   "

## 2024-06-10 DIAGNOSIS — J30.89 NON-SEASONAL ALLERGIC RHINITIS, UNSPECIFIED TRIGGER: Chronic | ICD-10-CM

## 2024-06-10 RX ORDER — MONTELUKAST SODIUM 10 MG/1
10 TABLET ORAL
Qty: 90 TABLET | Refills: 1 | Status: SHIPPED | OUTPATIENT
Start: 2024-06-10

## 2024-07-19 ENCOUNTER — OFFICE VISIT (OUTPATIENT)
Dept: FAMILY MEDICINE CLINIC | Facility: CLINIC | Age: 89
End: 2024-07-19
Payer: MEDICARE

## 2024-07-19 VITALS
BODY MASS INDEX: 20.71 KG/M2 | DIASTOLIC BLOOD PRESSURE: 60 MMHG | OXYGEN SATURATION: 97 % | HEIGHT: 69 IN | HEART RATE: 67 BPM | SYSTOLIC BLOOD PRESSURE: 120 MMHG | WEIGHT: 139.8 LBS

## 2024-07-19 DIAGNOSIS — R26.9 GAIT ABNORMALITY: ICD-10-CM

## 2024-07-19 DIAGNOSIS — Z93.3 COLOSTOMY PRESENT (HCC): Chronic | ICD-10-CM

## 2024-07-19 DIAGNOSIS — I48.0 PAROXYSMAL ATRIAL FIBRILLATION (HCC): Chronic | ICD-10-CM

## 2024-07-19 DIAGNOSIS — R31.0 GROSS HEMATURIA: Primary | ICD-10-CM

## 2024-07-19 DIAGNOSIS — I95.1 ORTHOSTATIC HYPOTENSION: ICD-10-CM

## 2024-07-19 PROCEDURE — G2211 COMPLEX E/M VISIT ADD ON: HCPCS | Performed by: FAMILY MEDICINE

## 2024-07-19 PROCEDURE — 99214 OFFICE O/P EST MOD 30 MIN: CPT | Performed by: FAMILY MEDICINE

## 2024-07-19 NOTE — PROGRESS NOTES
Ambulatory Visit  Name: Av Rossi Jr.      : 1930      MRN: [id number]  Encounter Provider: Nba Sweet MD  Encounter Date: 2024   Encounter department: [facility] PRIMARY CARE    Assessment & Plan   1. Gross hematuria  Assessment & Plan:  Does appear to be in sinus rhythm.  Will stop the baby aspirin at this time but I do feel needs to check in with urology and redid the referral  Orders:  -     Ambulatory Referral to Urology; Future; Expected date: 2024  2. Paroxysmal atrial fibrillation (HCC)  Assessment & Plan:  Does appear in sinus rhythm.  Will be stopping the baby aspirin but should continue the amiodarone and follow-up  3. Orthostatic hypotension  Assessment & Plan:  Seems to be doing better with this    4. Colostomy present (HCC)  Assessment & Plan:  Is functioning well.  Continue basic care  5. Gait abnormality  Assessment & Plan:  Needs to spend much more time walking around the house to improve leg strength         History of Present Illness       Patient has history of hypertension, rectal carcinoma, functional colostomy, allergic rhinitis, paroxysmal atrial fibrillation and urinary incontinence had been hospitalized for paroxysmal atrial fibrillation with lightheadedness.  Was placed on Metroprolol 50 mg twice a day as well as Plavix and digoxin every other day instead of daily.  Was hypotensive and went back to the hospital.  The metoprolol was decreased to 12.5 mg at bedtime.  Plavix was discontinued and Eliquis 2.5 mg was started.  He was also placed on amiodarone and digoxin was stopped.  Was continued to have intermittent problems with gross hematuria and the Eliquis was stopped but was continue with baby aspirin.  Did have return of the hematuria 2 days ago      Review of Systems   Constitutional:  Negative for appetite change, fatigue and fever.   HENT:  Negative for congestion.    Respiratory:  Negative for cough, chest tightness and  "shortness of breath.    Gastrointestinal:  Negative for abdominal pain and constipation (Does have colostomy that is functioning).   Endocrine: Negative for polyuria (Nocturia x 1).   Genitourinary:  Positive for hematuria. Negative for difficulty urinating.   Musculoskeletal:  Positive for gait problem.   Neurological:  Positive for weakness. Negative for dizziness and light-headedness.   Psychiatric/Behavioral:  Negative for sleep disturbance.      Past Medical History:   Diagnosis Date    Atrial fibrillation (HCC)     Colorectal cancer (HCC)     HL (hearing loss)     Hypertension     Pneumonia     Rectal carcinoma (HCC) 7/31/2019     Past Surgical History:   Procedure Laterality Date    COLECTOMY      COLOSTOMY       Family History   Problem Relation Age of Onset    Heart disease Mother     Heart disease Father      Social History     Tobacco Use    Smoking status: Former     Current packs/day: 0.50     Average packs/day: 0.5 packs/day for 40.0 years (20.0 total pack years)     Types: Cigarettes    Smokeless tobacco: Never   Vaping Use    Vaping status: Never Used   Substance and Sexual Activity    Alcohol use: Yes    Drug use: No    Sexual activity: Not on file     Current Outpatient Medications on File Prior to Visit   Medication Sig    amiodarone 100 mg tablet Take 100 mg by mouth daily    docusate sodium (COLACE) 100 mg capsule Take 1 capsule (100 mg total) by mouth every 12 (twelve) hours    metoprolol succinate (TOPROL-XL) 25 mg 24 hr tablet Take 0.5 tablets (12.5 mg total) by mouth daily at bedtime    montelukast (SINGULAIR) 10 mg tablet Take 1 tablet (10 mg total) by mouth daily at bedtime    Multiple Vitamin (MULTIVITAMIN) capsule Take 1 capsule by mouth daily    nitroglycerin (NITROSTAT) 0.4 mg SL tablet Place 1 tablet under the tongue every 5 (five) minutes as needed    Ostomy Supplies (New Image Closed 2-3/4\") Pouch MISC USE AS DIRECTED BY YOUR PHYSICIAN.    Ostomy Supplies (New Image Flat Barrier " "70MM) WAFR USE AS DIRECTED BY YOUR PHYSICIAN.    aspirin (ECOTRIN LOW STRENGTH) 81 mg EC tablet Take 1 tablet (81 mg total) by mouth daily    atorvastatin (LIPITOR) 20 mg tablet Take 1 tablet (20 mg total) by mouth daily (Patient not taking: Reported on 3/21/2024)    azelastine (ASTELIN) 0.1 % nasal spray 1 spray into each nostril 2 (two) times a day Use in each nostril as directed (Patient not taking: Reported on 5/24/2024)    metoprolol tartrate (LOPRESSOR) 50 mg tablet Take 50 mg by mouth every 12 (twelve) hours (Patient not taking: Reported on 3/21/2024)    multivitamin (THERAGRAN) TABS Take 1 tablet by mouth daily (Patient not taking: Reported on 5/24/2024)    predniSONE 20 mg tablet Take 40 mg by mouth daily (Patient not taking: Reported on 5/24/2024)     No Known Allergies  Immunization History   Administered Date(s) Administered    INFLUENZA 09/12/2007, 09/18/2008, 09/14/2009, 09/16/2010, 10/25/2011, 11/02/2012, 11/14/2013, 11/20/2014, 10/08/2015, 11/30/2016, 12/12/2017, 10/18/2018    Influenza, high dose seasonal 0.7 mL 10/29/2019, 10/29/2020, 09/20/2021, 09/26/2022, 10/03/2023    Pneumococcal Polysaccharide PPV23 08/20/2014     Objective     Blood Pressure 120/60 (BP Location: Left arm, Patient Position: Sitting, Cuff Size: Standard)   Pulse 67   Height 5' 9\" (1.753 m)   Weight 63.4 kg (139 lb 12.8 oz)   Oxygen Saturation 97%   Body Mass Index 20.64 kg/m²     Physical Exam  Vitals and nursing note reviewed.   Constitutional:       General: He is not in acute distress.     Appearance: Normal appearance. He is well-developed.   HENT:      Head: Normocephalic and atraumatic.   Eyes:      Conjunctiva/sclera: Conjunctivae normal.   Neck:      Vascular: No carotid bruit.   Cardiovascular:      Rate and Rhythm: Normal rate and regular rhythm.      Heart sounds: Murmur (Soft systolic murmur at left sternal border.  Heart rate is 72, heart sounds are distinct) heard.   Pulmonary:      Effort: Pulmonary effort " is normal. No respiratory distress.      Breath sounds: Normal breath sounds.   Abdominal:      General: Abdomen is flat.      Palpations: Abdomen is soft.      Tenderness: There is no abdominal tenderness.   Musculoskeletal:         General: No swelling.      Cervical back: Neck supple. No tenderness.      Right lower leg: No edema.      Left lower leg: No edema.   Skin:     General: Skin is warm and dry.      Capillary Refill: Capillary refill takes less than 2 seconds.      Findings: No rash.   Neurological:      Mental Status: He is alert.      Motor: Weakness (In the legs) present.      Coordination: Coordination normal.      Gait: Gait abnormal.      Deep Tendon Reflexes: Reflexes normal.   Psychiatric:         Mood and Affect: Mood normal.

## 2024-07-21 PROBLEM — R26.9 GAIT ABNORMALITY: Status: ACTIVE | Noted: 2024-07-21

## 2024-07-21 NOTE — ASSESSMENT & PLAN NOTE
Does appear to be in sinus rhythm.  Will stop the baby aspirin at this time but I do feel needs to check in with urology and redid the referral

## 2024-09-17 DIAGNOSIS — I48.0 PAROXYSMAL ATRIAL FIBRILLATION (HCC): ICD-10-CM

## 2024-09-18 RX ORDER — METOPROLOL SUCCINATE 25 MG/1
TABLET, EXTENDED RELEASE ORAL
Qty: 45 TABLET | Refills: 1 | Status: SHIPPED | OUTPATIENT
Start: 2024-09-18

## 2024-10-03 ENCOUNTER — TELEPHONE (OUTPATIENT)
Dept: FAMILY MEDICINE CLINIC | Facility: CLINIC | Age: 89
End: 2024-10-03

## 2024-10-03 DIAGNOSIS — J30.89 NON-SEASONAL ALLERGIC RHINITIS, UNSPECIFIED TRIGGER: Chronic | ICD-10-CM

## 2024-10-03 RX ORDER — MONTELUKAST SODIUM 10 MG/1
10 TABLET ORAL
Qty: 90 TABLET | Refills: 0 | Status: SHIPPED | OUTPATIENT
Start: 2024-10-03

## 2024-10-23 DIAGNOSIS — Z93.3 COLOSTOMY PRESENT (HCC): Primary | Chronic | ICD-10-CM

## 2024-10-23 NOTE — TELEPHONE ENCOUNTER
Patient called the RX Refill Line. Message is being forwarded to the office.     Patient's daughter is requesting a prescription for ostomy paste. Patient is just about out of paste. Please send to [facility]     Please contact patient's daughter at [phone]

## 2024-10-24 ENCOUNTER — TELEPHONE (OUTPATIENT)
Age: 89
End: 2024-10-24

## 2024-10-24 NOTE — TELEPHONE ENCOUNTER
Pt daughter called in stating that the pharmacy has reached out to the provider twice to get the refill but not refilled yet. She mentioned is Asthimic paste 2 tubes. I am unable to find on the medication list. Kindly help with the refill and advise her on the same by an return call. Thanks

## 2024-11-06 ENCOUNTER — OFFICE VISIT (OUTPATIENT)
Dept: FAMILY MEDICINE CLINIC | Facility: CLINIC | Age: 89
End: 2024-11-06
Payer: MEDICARE

## 2024-11-06 VITALS
SYSTOLIC BLOOD PRESSURE: 130 MMHG | WEIGHT: 148.6 LBS | DIASTOLIC BLOOD PRESSURE: 70 MMHG | HEIGHT: 69 IN | BODY MASS INDEX: 22.01 KG/M2

## 2024-11-06 DIAGNOSIS — Z93.3 COLOSTOMY PRESENT (HCC): Chronic | ICD-10-CM

## 2024-11-06 DIAGNOSIS — R31.0 GROSS HEMATURIA: ICD-10-CM

## 2024-11-06 DIAGNOSIS — I48.0 PAROXYSMAL ATRIAL FIBRILLATION (HCC): Primary | Chronic | ICD-10-CM

## 2024-11-06 DIAGNOSIS — J30.89 NON-SEASONAL ALLERGIC RHINITIS, UNSPECIFIED TRIGGER: Chronic | ICD-10-CM

## 2024-11-06 DIAGNOSIS — I95.1 ORTHOSTATIC HYPOTENSION: ICD-10-CM

## 2024-11-06 DIAGNOSIS — R26.9 GAIT ABNORMALITY: ICD-10-CM

## 2024-11-06 PROCEDURE — G2211 COMPLEX E/M VISIT ADD ON: HCPCS | Performed by: FAMILY MEDICINE

## 2024-11-06 PROCEDURE — 99214 OFFICE O/P EST MOD 30 MIN: CPT | Performed by: FAMILY MEDICINE

## 2024-11-06 NOTE — PROGRESS NOTES
Ambulatory Visit  Name: Av Rossi Jr.      : 1930      MRN: [id number]  Encounter Provider: Nba Sweet MD  Encounter Date: 2024   Encounter department: [facility] PRIMARY CARE    Assessment & Plan  Colostomy present (HCC)  Seems stable, continue basic skin care       Orthostatic hypotension  No complaints of lightheadedness.  Will continue to follow       Paroxysmal atrial fibrillation (HCC)  Sinus rhythm today, continue current regimen       Non-seasonal allergic rhinitis, unspecified trigger  Mildly symptomatic.  May restart the Astelin and continue the Singulair       Gross hematuria  I still feel would benefit from following up with urology       Gait abnormality  Needs to push walking activity using cane to improve leg strength            History of Present Illness       Patient has history of hypertension, rectal carcinoma, functional colostomy, allergic rhinitis, paroxysmal atrial fibrillation and urinary incontinence had been hospitalized for paroxysmal atrial fibrillation with lightheadedness.  Was placed on Metroprolol 50 mg twice a day as well as Plavix and digoxin every other day instead of daily.  Was hypotensive and went back to the hospital.  The metoprolol was decreased to 12.5 mg at bedtime.  Plavix was discontinued and Eliquis 2.5 mg was started.  He was also placed on amiodarone and digoxin was stopped.  Was continued to have intermittent problems with gross hematuria and the Eliquis was stopped but was continue with baby aspirin.  Still has not checked in with urology      Review of Systems   Constitutional:  Negative for activity change, appetite change, chills, fatigue, fever and unexpected weight change.   HENT:  Negative for rhinorrhea and trouble swallowing.    Eyes: Negative.  Negative for visual disturbance.   Respiratory:  Negative for apnea, cough, chest tightness and shortness of breath.    Cardiovascular:  Negative for chest pain,  palpitations and leg swelling.   Gastrointestinal:  Negative for abdominal distention (Has functioning colostomy), abdominal pain, constipation and diarrhea.   Endocrine: Negative for polyuria (Nocturia x 1-2).   Genitourinary:  Negative for difficulty urinating.   Musculoskeletal:  Negative for arthralgias and myalgias.   Skin:  Negative for color change and rash.   Allergic/Immunologic: Positive for environmental allergies.   Neurological:  Negative for dizziness, weakness, light-headedness, numbness and headaches.   Hematological:  Negative for adenopathy.   Psychiatric/Behavioral:  Negative for agitation and sleep disturbance.    All other systems reviewed and are negative.    Past Medical History:   Diagnosis Date    Atrial fibrillation (HCC)     Colorectal cancer (HCC)     HL (hearing loss)     Hypertension     Pneumonia     Rectal carcinoma (HCC) 7/31/2019     Past Surgical History:   Procedure Laterality Date    COLECTOMY      COLOSTOMY       Family History   Problem Relation Age of Onset    Heart disease Mother     Heart disease Father      Social History     Tobacco Use    Smoking status: Former     Current packs/day: 0.50     Average packs/day: 0.5 packs/day for 40.0 years (20.0 total pack years)     Types: Cigarettes    Smokeless tobacco: Never   Vaping Use    Vaping status: Never Used   Substance and Sexual Activity    Alcohol use: Yes    Drug use: No    Sexual activity: Not on file     Current Outpatient Medications on File Prior to Visit   Medication Sig    amiodarone 100 mg tablet Take 100 mg by mouth daily    docusate sodium (COLACE) 100 mg capsule Take 1 capsule (100 mg total) by mouth every 12 (twelve) hours    metoprolol succinate (TOPROL-XL) 25 mg 24 hr tablet TAKE ONE HALF TABLET BY MOUTH ONCE NIGHTLY AT BEDTIME    montelukast (SINGULAIR) 10 mg tablet Take 1 tablet (10 mg total) by mouth daily at bedtime    Multiple Vitamin (MULTIVITAMIN) capsule Take 1 capsule by mouth daily    nitroglycerin  "(NITROSTAT) 0.4 mg SL tablet Place 1 tablet under the tongue every 5 (five) minutes as needed    Ostomy Supplies (New Image Closed 2-3/4\") Pouch MISC USE AS DIRECTED BY YOUR PHYSICIAN.    Ostomy Supplies (New Image Flat Barrier 70MM) WAFR USE AS DIRECTED BY YOUR PHYSICIAN.    Ostomy Supplies PSTE USE AS DIRECTED BY PHYSICIAN    aspirin (ECOTRIN LOW STRENGTH) 81 mg EC tablet Take 1 tablet (81 mg total) by mouth daily    atorvastatin (LIPITOR) 20 mg tablet Take 1 tablet (20 mg total) by mouth daily (Patient not taking: Reported on 3/21/2024)    azelastine (ASTELIN) 0.1 % nasal spray 1 spray into each nostril 2 (two) times a day Use in each nostril as directed (Patient not taking: Reported on 5/24/2024)    multivitamin (THERAGRAN) TABS Take 1 tablet by mouth daily (Patient not taking: Reported on 5/24/2024)    predniSONE 20 mg tablet Take 40 mg by mouth daily (Patient not taking: Reported on 5/24/2024)    [DISCONTINUED] metoprolol tartrate (LOPRESSOR) 50 mg tablet Take 50 mg by mouth every 12 (twelve) hours (Patient not taking: Reported on 3/21/2024)     No Known Allergies  Immunization History   Administered Date(s) Administered    INFLUENZA 09/12/2007, 09/18/2008, 09/14/2009, 09/16/2010, 10/25/2011, 11/02/2012, 11/14/2013, 11/20/2014, 10/08/2015, 11/30/2016, 12/12/2017, 10/18/2018    Influenza, high dose seasonal 0.7 mL 10/29/2019, 10/29/2020, 09/20/2021, 09/26/2022, 10/03/2023    Pneumococcal Polysaccharide PPV23 08/20/2014     Objective     Blood Pressure 130/70 (BP Location: Left arm, Patient Position: Sitting, Cuff Size: Large)   Height 5' 9\" (1.753 m)   Weight 67.4 kg (148 lb 9.6 oz)   Body Mass Index 21.94 kg/m²     Physical Exam  Vitals and nursing note reviewed.   Constitutional:       General: He is not in acute distress.     Appearance: He is well-developed and underweight.   HENT:      Head: Normocephalic and atraumatic.      Nose: Nose normal. No congestion (Mucus is clear).   Eyes:      " Conjunctiva/sclera: Conjunctivae normal.   Neck:      Vascular: No carotid bruit.   Cardiovascular:      Rate and Rhythm: Normal rate and regular rhythm.      Heart sounds: Murmur (Soft systolic murmur at left sternal border.  Heart sounds are distinct and heart rate is 72) heard.   Pulmonary:      Effort: Pulmonary effort is normal. No respiratory distress.      Breath sounds: Normal breath sounds.   Abdominal:      General: Abdomen is flat.      Palpations: Abdomen is soft. There is no mass.      Tenderness: There is no abdominal tenderness.   Musculoskeletal:         General: No swelling.      Cervical back: Neck supple. No tenderness.      Right lower leg: No edema.      Left lower leg: No edema.   Lymphadenopathy:      Cervical: No cervical adenopathy.   Skin:     General: Skin is warm and dry.      Findings: No rash.   Neurological:      Mental Status: He is alert.      Motor: No weakness.      Coordination: Coordination normal.      Gait: Gait normal.      Deep Tendon Reflexes: Reflexes normal.   Psychiatric:         Mood and Affect: Mood normal.

## 2024-11-06 NOTE — PATIENT INSTRUCTIONS
Overall seems to be doing well.  Urged to push more walking activity and just carry a cane.  Recommended flu shot patient declined.  Will continue with current meds

## 2025-01-06 ENCOUNTER — HOSPITAL ENCOUNTER (EMERGENCY)
Facility: HOSPITAL | Age: OVER 89
Discharge: HOME | End: 2025-01-06
Attending: EMERGENCY MEDICINE
Payer: MEDICARE

## 2025-01-06 ENCOUNTER — APPOINTMENT (EMERGENCY)
Dept: CT IMAGING | Facility: HOSPITAL | Age: OVER 89
End: 2025-01-06
Payer: MEDICARE

## 2025-01-06 ENCOUNTER — APPOINTMENT (EMERGENCY)
Dept: RADIOLOGY | Facility: HOSPITAL | Age: OVER 89
End: 2025-01-06
Payer: MEDICARE

## 2025-01-06 VITALS
HEART RATE: 65 BPM | OXYGEN SATURATION: 95 % | SYSTOLIC BLOOD PRESSURE: 159 MMHG | RESPIRATION RATE: 19 BRPM | DIASTOLIC BLOOD PRESSURE: 72 MMHG | TEMPERATURE: 97.2 F

## 2025-01-06 DIAGNOSIS — M54.9 BACK PAIN: ICD-10-CM

## 2025-01-06 DIAGNOSIS — I71.9 AORTIC ANEURYSM WITHOUT RUPTURE, UNSPECIFIED PORTION OF AORTA (HCC): Primary | ICD-10-CM

## 2025-01-06 LAB
ALBUMIN SERPL BCG-MCNC: 4.4 G/DL (ref 3.5–5)
ALP SERPL-CCNC: 70 U/L (ref 34–104)
ALT SERPL W P-5'-P-CCNC: 12 U/L (ref 7–52)
ANION GAP SERPL CALCULATED.3IONS-SCNC: 9 MMOL/L (ref 4–13)
AST SERPL W P-5'-P-CCNC: 24 U/L (ref 13–39)
BASOPHILS # BLD AUTO: 0.06 THOUSANDS/ΜL (ref 0–0.1)
BASOPHILS NFR BLD AUTO: 1 % (ref 0–1)
BILIRUB SERPL-MCNC: 0.66 MG/DL (ref 0.2–1)
BUN SERPL-MCNC: 31 MG/DL (ref 5–25)
CALCIUM SERPL-MCNC: 9.7 MG/DL (ref 8.4–10.2)
CARDIAC TROPONIN I PNL SERPL HS: 7 NG/L (ref ?–50)
CHLORIDE SERPL-SCNC: 101 MMOL/L (ref 96–108)
CO2 SERPL-SCNC: 26 MMOL/L (ref 21–32)
CREAT SERPL-MCNC: 2.01 MG/DL (ref 0.6–1.3)
EOSINOPHIL # BLD AUTO: 0.33 THOUSAND/ΜL (ref 0–0.61)
EOSINOPHIL NFR BLD AUTO: 4 % (ref 0–6)
ERYTHROCYTE [DISTWIDTH] IN BLOOD BY AUTOMATED COUNT: 13.9 % (ref 11.6–15.1)
GFR SERPL CREATININE-BSD FRML MDRD: 27 ML/MIN/1.73SQ M
GLUCOSE SERPL-MCNC: 115 MG/DL (ref 65–140)
HCT VFR BLD AUTO: 36.9 % (ref 36.5–49.3)
HGB BLD-MCNC: 12.4 G/DL (ref 12–17)
IMM GRANULOCYTES # BLD AUTO: 0.04 THOUSAND/UL (ref 0–0.2)
IMM GRANULOCYTES NFR BLD AUTO: 0 % (ref 0–2)
LYMPHOCYTES # BLD AUTO: 2.96 THOUSANDS/ΜL (ref 0.6–4.47)
LYMPHOCYTES NFR BLD AUTO: 32 % (ref 14–44)
MCH RBC QN AUTO: 33 PG (ref 26.8–34.3)
MCHC RBC AUTO-ENTMCNC: 33.6 G/DL (ref 31.4–37.4)
MCV RBC AUTO: 98 FL (ref 82–98)
MONOCYTES # BLD AUTO: 0.88 THOUSAND/ΜL (ref 0.17–1.22)
MONOCYTES NFR BLD AUTO: 10 % (ref 4–12)
NEUTROPHILS # BLD AUTO: 4.87 THOUSANDS/ΜL (ref 1.85–7.62)
NEUTS SEG NFR BLD AUTO: 53 % (ref 43–75)
NRBC BLD AUTO-RTO: 0 /100 WBCS
PLATELET # BLD AUTO: 173 THOUSANDS/UL (ref 149–390)
PMV BLD AUTO: 9.8 FL (ref 8.9–12.7)
POTASSIUM SERPL-SCNC: 4.5 MMOL/L (ref 3.5–5.3)
PROT SERPL-MCNC: 8 G/DL (ref 6.4–8.4)
RBC # BLD AUTO: 3.76 MILLION/UL (ref 3.88–5.62)
SODIUM SERPL-SCNC: 136 MMOL/L (ref 135–147)
WBC # BLD AUTO: 9.14 THOUSAND/UL (ref 4.31–10.16)

## 2025-01-06 PROCEDURE — 71250 CT THORAX DX C-: CPT

## 2025-01-06 PROCEDURE — 74176 CT ABD & PELVIS W/O CONTRAST: CPT

## 2025-01-06 PROCEDURE — 84484 ASSAY OF TROPONIN QUANT: CPT | Performed by: EMERGENCY MEDICINE

## 2025-01-06 PROCEDURE — 99284 EMERGENCY DEPT VISIT MOD MDM: CPT | Performed by: EMERGENCY MEDICINE

## 2025-01-06 PROCEDURE — 72170 X-RAY EXAM OF PELVIS: CPT

## 2025-01-06 PROCEDURE — 72125 CT NECK SPINE W/O DYE: CPT

## 2025-01-06 PROCEDURE — 70450 CT HEAD/BRAIN W/O DYE: CPT

## 2025-01-06 PROCEDURE — 99285 EMERGENCY DEPT VISIT HI MDM: CPT

## 2025-01-06 PROCEDURE — 93005 ELECTROCARDIOGRAM TRACING: CPT

## 2025-01-06 PROCEDURE — 85025 COMPLETE CBC W/AUTO DIFF WBC: CPT | Performed by: EMERGENCY MEDICINE

## 2025-01-06 PROCEDURE — 36415 COLL VENOUS BLD VENIPUNCTURE: CPT | Performed by: EMERGENCY MEDICINE

## 2025-01-06 PROCEDURE — 80053 COMPREHEN METABOLIC PANEL: CPT | Performed by: EMERGENCY MEDICINE

## 2025-01-06 RX ORDER — OXYCODONE AND ACETAMINOPHEN 5; 325 MG/1; MG/1
1 TABLET ORAL EVERY 4 HOURS PRN
Qty: 9 TABLET | Refills: 0 | Status: SHIPPED | OUTPATIENT
Start: 2025-01-06 | End: 2025-01-16

## 2025-01-06 RX ORDER — OXYCODONE AND ACETAMINOPHEN 5; 325 MG/1; MG/1
1 TABLET ORAL ONCE
Refills: 0 | Status: COMPLETED | OUTPATIENT
Start: 2025-01-06 | End: 2025-01-06

## 2025-01-06 RX ADMIN — OXYCODONE HYDROCHLORIDE AND ACETAMINOPHEN 1 TABLET: 5; 325 TABLET ORAL at 15:50

## 2025-01-06 NOTE — DISCHARGE INSTRUCTIONS
You were seen in the emergency department for a fall, your CT scan results are below, we provided you with an ambulatory referral to vascular surgery, please call their office and schedule an appointment.  They are expecting your phone call, you also had chronic compression fractures, we provided you with a prescription for pain medication, please pick it up at the pharmacy and take it as directed.  If your symptoms worsen or persist, please return to the emergency department immediately.    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 No acute fracture in the thoracolumbar spine.   Unchanged 3.3 cm infrarenal abdominal aortic aneurysm without rupture.

## 2025-01-06 NOTE — ED PROVIDER NOTES
Time reflects when diagnosis was documented in both MDM as applicable and the Disposition within this note       Time User Action Codes Description Comment    1/6/2025  3:44 PM Juan Rothman [I71.9] Aortic aneurysm without rupture, unspecified portion of aorta (HCC)     1/6/2025  3:45 PM Juan Rothman [M54.9] Back pain           ED Disposition       ED Disposition   Discharge    Condition   Stable    Date/Time   Mon Jan 6, 2025  3:48 PM    Comment   Av Rossi Jr. discharge to home/self care.                   Assessment & Plan       Medical Decision Making  89-year-old male presents to the emergency department with fall from standing that occurred on Saturday.  Patient did make complaint of dizziness on that day, will perform CT head, neck, chest abdomen pelvis, x-rays of hip, basic labs, cardiac enzymes, urine analysis, and reassess.  Patient denies any chest pain, shortness of breath, or other complaints at this time.  He denies any vision changes, focal weakness, or changes in sensation.    Discussed results with patient, and family, they will follow-up with primary care doctor and vascular surgery outpatient for further evaluation.  Patient provided prescription for pain medication, he is ambulating in the emergency department without difficulty or assistance, and getting in and out of his bed by himself.  Strict return precautions.  Patient and family understand and agree with treatment plan.    Amount and/or Complexity of Data Reviewed  Labs: ordered.  Radiology: ordered.    Risk  Prescription drug management.             Medications   oxyCODONE-acetaminophen (PERCOCET) 5-325 mg per tablet 1 tablet (1 tablet Oral Given 1/6/25 1550)       ED Risk Strat Scores                          SBIRT 20yo+      Flowsheet Row Most Recent Value   Initial Alcohol Screen: US AUDIT-C     1. How often do you have a drink containing alcohol? 0 Filed at: 01/06/2025 0587   2. How many drinks containing alcohol do  you have on a typical day you are drinking?  0 Filed at: 01/06/2025 7820   3b. FEMALE Any Age, or MALE 65+: How often do you have 4 or more drinks on one occassion? 0 Filed at: 01/06/2025 1168   Audit-C Score 0 Filed at: 01/06/2025 8981   SAYRA: How many times in the past year have you...    Used an illegal drug or used a prescription medication for non-medical reasons? Never Filed at: 01/06/2025 6063                            History of Present Illness       Chief Complaint   Patient presents with    Fall     Pt presents to ED with family. States he fell on Saturday and struck right hip on step. Denies HS, no blood thinners reported. Patient states he felt light headed prior to fall.        Past Medical History:   Diagnosis Date    Atrial fibrillation (HCC)     Colorectal cancer (HCC)     HL (hearing loss)     Hypertension     Pneumonia     Rectal carcinoma (HCC) 7/31/2019      Past Surgical History:   Procedure Laterality Date    COLECTOMY      COLOSTOMY        Family History   Problem Relation Age of Onset    Heart disease Mother     Heart disease Father       Social History     Tobacco Use    Smoking status: Former     Current packs/day: 0.50     Average packs/day: 0.5 packs/day for 40.0 years (20.0 ttl pk-yrs)     Types: Cigarettes    Smokeless tobacco: Never   Vaping Use    Vaping status: Never Used   Substance Use Topics    Alcohol use: Yes    Drug use: No      E-Cigarette/Vaping    E-Cigarette Use Never User       E-Cigarette/Vaping Substances    Nicotine No     THC No     CBD No     Flavoring No     Other No     Unknown No       I have reviewed and agree with the history as documented.     89-year-old male with past medical history of atrial fibrillation, colorectal cancer, colostomy bag, hypertension, rectal carcinoma, presents to the emergency department with a fall that occurred on Saturday.  Patient states that he felt a little dizzy that day, had a fall, landed on his right side, he has complaining of  right abdominal/hip pain.  Patient states that he got up immediately afterward, and family reports that he has been performing his normal routines, ambulating without difficulty, however complains of some soreness and pain in the right hip.  Patient has had no other complaints, he denies any fevers, chest pain, shortness of breath, bloody stool, bloody urine, or pain with urination.      Fall  Associated symptoms: abdominal pain    Associated symptoms: no back pain, no chest pain, no seizures and no vomiting        Review of Systems   Constitutional:  Negative for chills and fever.   HENT:  Negative for ear pain and sore throat.    Eyes:  Negative for pain and visual disturbance.   Respiratory:  Negative for cough and shortness of breath.    Cardiovascular:  Negative for chest pain and palpitations.   Gastrointestinal:  Positive for abdominal pain. Negative for vomiting.   Genitourinary:  Negative for dysuria and hematuria.   Musculoskeletal:  Positive for arthralgias. Negative for back pain.   Skin:  Negative for color change and rash.   Neurological:  Negative for seizures and syncope.   All other systems reviewed and are negative.          Objective       ED Triage Vitals   Temperature Pulse Blood Pressure Respirations SpO2 Patient Position - Orthostatic VS   01/06/25 1353 01/06/25 1353 01/06/25 1353 01/06/25 1353 01/06/25 1353 01/06/25 1353   (!) 97.2 °F (36.2 °C) 80 111/71 18 96 % Sitting      Temp Source Heart Rate Source BP Location FiO2 (%) Pain Score    01/06/25 1353 01/06/25 1353 01/06/25 1353 -- 01/06/25 1550    Temporal Monitor Left arm  9      Vitals      Date and Time Temp Pulse SpO2 Resp BP Pain Score FACES Pain Rating User   01/06/25 1550 -- -- -- -- -- 9 -- CG   01/06/25 1530 -- 65 95 % 19 159/72 -- -- LAK   01/06/25 1500 -- 66 98 % 18 147/87 -- -- LAK   01/06/25 1430 -- 72 97 % 18 140/81 -- -- LAK   01/06/25 1400 -- 69 96 % 21 166/74 -- -- CG   01/06/25 1353 97.2 °F (36.2 °C) 80 96 % 18 111/71 --  -- MB            Physical Exam  Vitals and nursing note reviewed.   Constitutional:       General: He is not in acute distress.     Appearance: He is well-developed.   HENT:      Head: Normocephalic and atraumatic.   Eyes:      Conjunctiva/sclera: Conjunctivae normal.   Cardiovascular:      Rate and Rhythm: Normal rate and regular rhythm.      Heart sounds: No murmur heard.  Pulmonary:      Effort: Pulmonary effort is normal. No respiratory distress.      Breath sounds: Normal breath sounds.   Abdominal:      Palpations: Abdomen is soft.      Tenderness: There is abdominal tenderness.   Musculoskeletal:         General: Tenderness present. No swelling.      Cervical back: Neck supple.   Skin:     General: Skin is warm and dry.      Capillary Refill: Capillary refill takes less than 2 seconds.   Neurological:      Mental Status: He is alert.   Psychiatric:         Mood and Affect: Mood normal.         Results Reviewed       Procedure Component Value Units Date/Time    HS Troponin I 4hr [[id number]]     Lab Status: No result Specimen: Blood     HS Troponin 0hr (reflex protocol) [[id number]]  (Normal) Collected: 01/06/25 1411    Lab Status: Final result Specimen: Blood from Arm, Right Updated: 01/06/25 1445     hs TnI 0hr 7 ng/L     HS Troponin I 2hr [[id number]]     Lab Status: No result Specimen: Blood     Comprehensive metabolic panel [[id number]]  (Abnormal) Collected: 01/06/25 1411    Lab Status: Final result Specimen: Blood from Arm, Right Updated: 01/06/25 1435     Sodium 136 mmol/L      Potassium 4.5 mmol/L      Chloride 101 mmol/L      CO2 26 mmol/L      ANION GAP 9 mmol/L      BUN 31 mg/dL      Creatinine 2.01 mg/dL      Glucose 115 mg/dL      Calcium 9.7 mg/dL      AST 24 U/L      ALT 12 U/L      Alkaline Phosphatase 70 U/L      Total Protein 8.0 g/dL      Albumin 4.4 g/dL      Total Bilirubin 0.66 mg/dL      eGFR 27 ml/min/1.73sq m     Narrative:      National Kid[employer] guidelines for Chronic  Kidney Disease (CKD):     Stage 1 with normal or high GFR (GFR > 90 mL/min/1.73 square meters)    Stage 2 Mild CKD (GFR = 60-89 mL/min/1.73 square meters)    Stage 3A Moderate CKD (GFR = 45-59 mL/min/1.73 square meters)    Stage 3B Moderate CKD (GFR = 30-44 mL/min/1.73 square meters)    Stage 4 Severe CKD (GFR = 15-29 mL/min/1.73 square meters)    Stage 5 End Stage CKD (GFR <15 mL/min/1.73 square meters)  Note: GFR calculation is accurate only with a steady state creatinine    CBC and differential [[id number]]  (Abnormal) Collected: 01/06/25 1411    Lab Status: Final result Specimen: Blood from Arm, Right Updated: 01/06/25 1416     WBC 9.14 Thousand/uL      RBC 3.76 Million/uL      Hemoglobin 12.4 g/dL      Hematocrit 36.9 %      MCV 98 fL      MCH 33.0 pg      MCHC 33.6 g/dL      RDW 13.9 %      MPV 9.8 fL      Platelets 173 Thousands/uL      nRBC 0 /100 WBCs      Segmented % 53 %      Immature Grans % 0 %      Lymphocytes % 32 %      Monocytes % 10 %      Eosinophils Relative 4 %      Basophils Relative 1 %      Absolute Neutrophils 4.87 Thousands/µL      Absolute Immature Grans 0.04 Thousand/uL      Absolute Lymphocytes 2.96 Thousands/µL      Absolute Monocytes 0.88 Thousand/µL      Eosinophils Absolute 0.33 Thousand/µL      Basophils Absolute 0.06 Thousands/µL     Urinalysis with microscopic [[id number]]     Lab Status: No result Specimen: Urine             CT head without contrast   Final Interpretation by Froy Moreno MD (01/06 1518)      No acute intracranial abnormality.  Chronic microangiopathic changes.                  Workstation performed: [id number]         CT recon only thoracolumbar   Final Interpretation by Froy Moreno MD (01/06 1518)      Mild chronic compression fractures of T4 and T11. No acute fracture in the thoracolumbar spine.               Workstation performed: [id number]         CT spine cervical without contrast   Final Interpretation by Froy Moreno MD (01/06 1518)      No cervical spine  "fracture or traumatic malalignment.                  Workstation performed: [id number]         CT chest abdomen pelvis wo contrast   Final Interpretation by Froy Moreno MD ( [id number])      No findings of acute traumatic injury in the chest, abdomen or pelvis within the limits of unenhanced technique.      Unchanged 3.3 cm infrarenal abdominal aortic aneurysm without rupture.         Workstation performed: [id number]         XR pelvis ap only 1 or 2 vw   Final Interpretation by Carlin Barbosa MD ( [id number])      No acute osseous abnormality.         Computerized Assisted Algorithm (CAA) may have been used to analyze all applicable images.         Workstation performed: [id number]             Procedures    ED Medication and Procedure Management   Prior to Admission Medications   Prescriptions Last Dose Informant Patient Reported? Taking?   Multiple Vitamin (MULTIVITAMIN) capsule  Self Yes No   Sig: Take 1 capsule by mouth daily   Ostomy Supplies (New Image Closed 2-3/4\") Pouch MISC   No No   Sig: USE AS DIRECTED BY YOUR PHYSICIAN.   Ostomy Supplies (New Image Flat Barrier 70MM) WAFR   No No   Sig: USE AS DIRECTED BY YOUR PHYSICIAN.   Ostomy Supplies PSTE   No No   Sig: USE AS DIRECTED BY PHYSICIAN   amiodarone 100 mg tablet   Yes No   Sig: Take 100 mg by mouth daily   aspirin (ECOTRIN LOW STRENGTH) 81 mg EC tablet   No No   Sig: Take 1 tablet (81 mg total) by mouth daily   atorvastatin (LIPITOR) 20 mg tablet   No No   Sig: Take 1 tablet (20 mg total) by mouth daily   Patient not taking: Reported on 3/21/2024   azelastine (ASTELIN) 0.1 % nasal spray   No No   Si spray into each nostril 2 (two) times a day Use in each nostril as directed   Patient not taking: Reported on 2024   docusate sodium (COLACE) 100 mg capsule   No No   Sig: Take 1 capsule (100 mg total) by mouth every 12 (twelve) hours   metoprolol succinate (TOPROL-XL) 25 mg 24 hr tablet   No No   Sig: TAKE ONE HALF TABLET BY MOUTH ONCE NIGHTLY AT BEDTIME " "  montelukast (SINGULAIR) 10 mg tablet   No No   Sig: Take 1 tablet (10 mg total) by mouth daily at bedtime   multivitamin (THERAGRAN) TABS   Yes No   Sig: Take 1 tablet by mouth daily   Patient not taking: Reported on 5/24/2024   predniSONE 20 mg tablet   Yes No   Sig: Take 40 mg by mouth daily   Patient not taking: Reported on 5/24/2024      Facility-Administered Medications: None     Discharge Medication List as of 1/6/2025  3:53 PM        START taking these medications    Details   oxyCODONE-acetaminophen (Percocet) 5-325 mg per tablet Take 1 tablet by mouth every 4 (four) hours as needed for moderate pain for up to 10 days Max Daily Amount: 6 tablets, Starting Mon 1/6/2025, Until Thu 1/16/2025 at 2359, Normal           CONTINUE these medications which have NOT CHANGED    Details   amiodarone 100 mg tablet Take 100 mg by mouth daily, Historical Med      aspirin (ECOTRIN LOW STRENGTH) 81 mg EC tablet Take 1 tablet (81 mg total) by mouth daily, Starting Sat 3/16/2024, Until Fri 5/24/2024, Normal      atorvastatin (LIPITOR) 20 mg tablet Take 1 tablet (20 mg total) by mouth daily, Starting Thu 1/18/2024, Normal      azelastine (ASTELIN) 0.1 % nasal spray 1 spray into each nostril 2 (two) times a day Use in each nostril as directed, Starting Thu 3/21/2024, Normal      docusate sodium (COLACE) 100 mg capsule Take 1 capsule (100 mg total) by mouth every 12 (twelve) hours, Starting Tue 3/5/2024, Normal      metoprolol succinate (TOPROL-XL) 25 mg 24 hr tablet TAKE ONE HALF TABLET BY MOUTH ONCE NIGHTLY AT BEDTIME, Normal      montelukast (SINGULAIR) 10 mg tablet Take 1 tablet (10 mg total) by mouth daily at bedtime, Starting Thu 10/3/2024, Normal      Multiple Vitamin (MULTIVITAMIN) capsule Take 1 capsule by mouth daily, Historical Med      multivitamin (THERAGRAN) TABS Take 1 tablet by mouth daily, Historical Med      Ostomy Supplies (New Image Closed 2-3/4\") Pouch MISC USE AS DIRECTED BY YOUR PHYSICIAN., Normal    "   Ostomy Supplies (New Image Flat Barrier 70MM) WAFR USE AS DIRECTED BY YOUR PHYSICIAN., Normal      Ostomy Supplies PSTE USE AS DIRECTED BY PHYSICIAN, Normal      predniSONE 20 mg tablet Take 40 mg by mouth daily, Starting Wed 11/22/2023, Historical Med             ED SEPSIS DOCUMENTATION   Time reflects when diagnosis was documented in both MDM as applicable and the Disposition within this note       Time User Action Codes Description Comment    1/6/2025  3:44 PM Juan Rothman Add [I71.9] Aortic aneurysm without rupture, unspecified portion of aorta (HCC)     1/6/2025  3:45 PM Juan Rothman Add [M54.9] Back pain                  Juan Rothman,   01/06/25 8833

## 2025-01-07 ENCOUNTER — VBI (OUTPATIENT)
Dept: FAMILY MEDICINE CLINIC | Facility: CLINIC | Age: OVER 89
End: 2025-01-07

## 2025-01-07 LAB
ATRIAL RATE: 69 BPM
P AXIS: 59 DEGREES
PR INTERVAL: 88 MS
QRS AXIS: 70 DEGREES
QRSD INTERVAL: 74 MS
QT INTERVAL: 404 MS
QTC INTERVAL: 432 MS
T WAVE AXIS: 73 DEGREES
VENTRICULAR RATE: 69 BPM

## 2025-01-07 NOTE — TELEPHONE ENCOUNTER
01/07/25 8:34 AM    Patient contacted post ED visit, [facility] department spoke with patient/caregiver and outreach was successful.    Thank you.  STEVE MARCELO MA  [facility]

## 2025-02-22 ENCOUNTER — OFFICE VISIT (OUTPATIENT)
Dept: URGENT CARE | Facility: CLINIC | Age: OVER 89
End: 2025-02-22
Payer: MEDICARE

## 2025-02-22 VITALS
DIASTOLIC BLOOD PRESSURE: 83 MMHG | TEMPERATURE: 95.3 F | RESPIRATION RATE: 16 BRPM | SYSTOLIC BLOOD PRESSURE: 183 MMHG | WEIGHT: 138.4 LBS | BODY MASS INDEX: 20.5 KG/M2 | OXYGEN SATURATION: 98 % | HEART RATE: 65 BPM | HEIGHT: 69 IN

## 2025-02-22 DIAGNOSIS — L03.032 CELLULITIS OF TOE OF LEFT FOOT: Primary | ICD-10-CM

## 2025-02-22 PROCEDURE — G0463 HOSPITAL OUTPT CLINIC VISIT: HCPCS

## 2025-02-22 PROCEDURE — 99213 OFFICE O/P EST LOW 20 MIN: CPT

## 2025-02-22 RX ORDER — CEPHALEXIN 500 MG/1
500 CAPSULE ORAL EVERY 6 HOURS SCHEDULED
Qty: 28 CAPSULE | Refills: 0 | Status: SHIPPED | OUTPATIENT
Start: 2025-02-22 | End: 2025-02-22

## 2025-02-22 RX ORDER — DOXYCYCLINE 100 MG/1
100 TABLET ORAL 2 TIMES DAILY
Qty: 14 TABLET | Refills: 0 | Status: SHIPPED | OUTPATIENT
Start: 2025-02-22 | End: 2025-03-01

## 2025-02-22 NOTE — PROGRESS NOTES
[facility]        NAME: Av Rossi Jr. is a 89 y.o. male  : 1930    MRN: [id number]  DATE: 2025  TIME: 4:58 PM    Assessment and Plan   Cellulitis of toe of left foot [L03.032]  1. Cellulitis of toe of left foot  doxycycline (ADOXA) 100 MG tablet    DISCONTINUED: cephalexin (KEFLEX) 500 mg capsule        Recommended follow-up with PCP for rash as it appears to be a pigment issue recommended potentially trying on antifungal cream.  Started on doxycycline for concern of cellulitis.  However recommended that based on patient age and appearance of wound should anything worsen in terms of redness, swelling, worsening sensation or pain or signs of infection to go to the emergency room for further evaluation.  Daughter in room expresses her understanding as well as patient too    Patient Instructions   There are no Patient Instructions on file for this visit.      Follow up with PCP in 3-5 days.  Proceed to  ER if symptoms worsen.    Chief Complaint     Chief Complaint   Patient presents with    Nail Problem     Left second toe reddened and swollen, started about 3 weeks ago after falling on the ice. Started out as a blister. Doing epsom salt soaks. The foot will occasionally swell up as well.     Rash     On right calf starting 2-3 days ago. Had similar rash on the left leg about 2 weeks ago.          History of Present Illness       89-year-old male presenting with daughter for right toe redness and swelling x 3 weeks.  Patient reports he slipped on ice about 3 weeks ago and it did form a bruise and at 1 point a small blood blister however it seems to have scabbed over and and had no further bleeding issues.  However the area around the toe appeared to be red and swollen.  Patient reports no issues of noticing any pain however concerned that the area could be getting infected as he had had cellulitis in the past.  Patient also reported mild rash on his right calf that he was  "previously thought to had shingles.  Patient denies any pain.  Patient denies any itchiness other than noticing the rashes there.  Denies any fevers, chills, bodies, nausea, vomiting or diarrhea.    Rash  Pertinent negatives include no congestion, cough, diarrhea, fatigue, fever, rhinorrhea, shortness of breath, sore throat or vomiting.       Review of Systems   Review of Systems   Constitutional:  Negative for chills, fatigue, fever and unexpected weight change.   HENT:  Negative for congestion, ear pain, rhinorrhea and sore throat.    Respiratory:  Negative for cough and shortness of breath.    Cardiovascular:  Negative for chest pain and palpitations.   Gastrointestinal:  Negative for abdominal pain, diarrhea, nausea and vomiting.   Genitourinary:  Negative for decreased urine volume, dysuria and frequency.   Musculoskeletal:  Positive for joint swelling. Negative for arthralgias, back pain and myalgias.   Skin:  Positive for color change, rash and wound. Negative for pallor.   Neurological:  Negative for weakness, light-headedness, numbness and headaches.         Current Medications       Current Outpatient Medications:     amiodarone 100 mg tablet, Take 100 mg by mouth daily, Disp: , Rfl:     doxycycline (ADOXA) 100 MG tablet, Take 1 tablet (100 mg total) by mouth 2 (two) times a day for 7 days, Disp: 14 tablet, Rfl: 0    Levocetirizine Dihydrochloride (XYZAL PO), Take by mouth, Disp: , Rfl:     metoprolol succinate (TOPROL-XL) 25 mg 24 hr tablet, TAKE ONE HALF TABLET BY MOUTH ONCE NIGHTLY AT BEDTIME, Disp: 45 tablet, Rfl: 1    Multiple Vitamin (MULTIVITAMIN) capsule, Take 1 capsule by mouth daily, Disp: , Rfl:     Ostomy Supplies (New Image Closed 2-3/4\") Pouch MISC, USE AS DIRECTED BY YOUR PHYSICIAN., Disp: 30 each, Rfl: 5    Ostomy Supplies (New Image Flat Barrier 70MM) WAFR, USE AS DIRECTED BY YOUR PHYSICIAN., Disp: 30 Wafer, Rfl: 5    Ostomy Supplies PSTE, USE AS DIRECTED BY PHYSICIAN, Disp: 2 each, Rfl: " "3    aspirin (ECOTRIN LOW STRENGTH) 81 mg EC tablet, Take 1 tablet (81 mg total) by mouth daily, Disp: 30 tablet, Rfl: 0    atorvastatin (LIPITOR) 20 mg tablet, Take 1 tablet (20 mg total) by mouth daily (Patient not taking: Reported on 2/22/2025), Disp: 30 tablet, Rfl: 5    azelastine (ASTELIN) 0.1 % nasal spray, 1 spray into each nostril 2 (two) times a day Use in each nostril as directed (Patient not taking: Reported on 2/22/2025), Disp: 30 mL, Rfl: 1    docusate sodium (COLACE) 100 mg capsule, Take 1 capsule (100 mg total) by mouth every 12 (twelve) hours (Patient not taking: Reported on 2/22/2025), Disp: 60 capsule, Rfl: 0    montelukast (SINGULAIR) 10 mg tablet, Take 1 tablet (10 mg total) by mouth daily at bedtime (Patient not taking: Reported on 2/22/2025), Disp: 90 tablet, Rfl: 0    multivitamin (THERAGRAN) TABS, Take 1 tablet by mouth daily (Patient not taking: Reported on 2/22/2025), Disp: , Rfl:     predniSONE 20 mg tablet, Take 40 mg by mouth daily (Patient not taking: Reported on 2/22/2025), Disp: , Rfl:     Current Allergies     Allergies as of 02/22/2025    (No Known Allergies)            The following portions of the patient's history were reviewed and updated as appropriate: allergies, current medications, past family history, past medical history, past social history, past surgical history and problem list.     Past Medical History:   Diagnosis Date    Atrial fibrillation (HCC)     Colorectal cancer (HCC)     HL (hearing loss)     Hypertension     Pneumonia     Rectal carcinoma (HCC) 7/31/2019       Past Surgical History:   Procedure Laterality Date    COLECTOMY      COLOSTOMY         Family History   Problem Relation Age of Onset    Heart disease Mother     Heart disease Father          Medications have been verified.        Objective   BP (!) 183/83   Pulse 65   Temp (!) 95.3 °F (35.2 °C)   Resp 16   Ht 5' 9\" (1.753 m)   Wt 62.8 kg (138 lb 6.4 oz)   SpO2 98%   BMI 20.44 kg/m²      "   Physical Exam     Physical Exam  Vitals and nursing note reviewed.   Constitutional:       General: He is not in acute distress.     Appearance: He is normal weight.   HENT:      Head: Normocephalic and atraumatic.      Right Ear: External ear normal.      Left Ear: External ear normal.      Nose: Nose normal.      Mouth/Throat:      Mouth: Mucous membranes are moist.      Pharynx: Oropharynx is clear.   Eyes:      Conjunctiva/sclera: Conjunctivae normal.      Pupils: Pupils are equal, round, and reactive to light.   Cardiovascular:      Rate and Rhythm: Normal rate and regular rhythm.      Pulses: Normal pulses.   Pulmonary:      Effort: Pulmonary effort is normal.   Musculoskeletal:      Comments: Sensation slightly decreased on left second toe compared to right.  Noticeable redness and swelling around previous wound that appears to be scabbing over and healing.  No purulent drainage.  Nontender to palpation.  On inner right calf appears to be darker pigmented nontender and nonpruritic 3 to 4 cm patch .   Skin:     General: Skin is warm.      Capillary Refill: Capillary refill takes less than 2 seconds.      Findings: Erythema present.   Neurological:      General: No focal deficit present.      Mental Status: He is alert and oriented to person, place, and time.      Sensory: Sensory deficit present.      Motor: No weakness.      Coordination: Coordination normal.      Gait: Gait normal.   Psychiatric:         Mood and Affect: Mood normal.         Behavior: Behavior normal.

## 2025-03-05 ENCOUNTER — OFFICE VISIT (OUTPATIENT)
Dept: FAMILY MEDICINE CLINIC | Facility: CLINIC | Age: OVER 89
End: 2025-03-05
Payer: MEDICARE

## 2025-03-05 VITALS
WEIGHT: 131 LBS | DIASTOLIC BLOOD PRESSURE: 70 MMHG | SYSTOLIC BLOOD PRESSURE: 110 MMHG | BODY MASS INDEX: 19.4 KG/M2 | HEIGHT: 69 IN

## 2025-03-05 DIAGNOSIS — E44.1 MILD PROTEIN-CALORIE MALNUTRITION (HCC): ICD-10-CM

## 2025-03-05 DIAGNOSIS — J30.89 NON-SEASONAL ALLERGIC RHINITIS, UNSPECIFIED TRIGGER: Chronic | ICD-10-CM

## 2025-03-05 DIAGNOSIS — Z00.00 MEDICARE ANNUAL WELLNESS VISIT, SUBSEQUENT: ICD-10-CM

## 2025-03-05 DIAGNOSIS — C20 RECTAL CARCINOMA (HCC): ICD-10-CM

## 2025-03-05 DIAGNOSIS — N18.31 CHRONIC RENAL FAILURE, STAGE 3A (HCC): ICD-10-CM

## 2025-03-05 DIAGNOSIS — Z93.3 COLOSTOMY PRESENT (HCC): ICD-10-CM

## 2025-03-05 DIAGNOSIS — S99.922A INJURY OF TOE ON LEFT FOOT, INITIAL ENCOUNTER: ICD-10-CM

## 2025-03-05 DIAGNOSIS — N39.46 MIXED STRESS AND URGE URINARY INCONTINENCE: Chronic | ICD-10-CM

## 2025-03-05 DIAGNOSIS — R31.0 GROSS HEMATURIA: ICD-10-CM

## 2025-03-05 DIAGNOSIS — R05.2 SUBACUTE COUGH: Primary | ICD-10-CM

## 2025-03-05 DIAGNOSIS — R26.9 GAIT ABNORMALITY: ICD-10-CM

## 2025-03-05 DIAGNOSIS — I25.2 H/O NON-ST ELEVATION MYOCARDIAL INFARCTION (NSTEMI): ICD-10-CM

## 2025-03-05 DIAGNOSIS — I48.0 PAROXYSMAL ATRIAL FIBRILLATION (HCC): ICD-10-CM

## 2025-03-05 PROCEDURE — 99215 OFFICE O/P EST HI 40 MIN: CPT | Performed by: FAMILY MEDICINE

## 2025-03-05 PROCEDURE — G0439 PPPS, SUBSEQ VISIT: HCPCS | Performed by: FAMILY MEDICINE

## 2025-03-05 PROCEDURE — G2211 COMPLEX E/M VISIT ADD ON: HCPCS | Performed by: FAMILY MEDICINE

## 2025-03-05 RX ORDER — BENZONATATE 200 MG/1
200 CAPSULE ORAL 3 TIMES DAILY PRN
Qty: 20 CAPSULE | Refills: 0 | Status: SHIPPED | OUTPATIENT
Start: 2025-03-05

## 2025-03-05 RX ORDER — SILVER SULFADIAZINE 10 MG/G
CREAM TOPICAL DAILY
Qty: 50 G | Refills: 1 | Status: SHIPPED | OUTPATIENT
Start: 2025-03-05

## 2025-03-05 NOTE — ASSESSMENT & PLAN NOTE
Appears in sinus rhythm at this time.  Continue current regimen but is only taking the aspirin for anticoagulation

## 2025-03-05 NOTE — PROGRESS NOTES
Name: Av Rossi Jr.      : 1930      MRN: [id number]  Encounter Provider: Nba Sweet MD  Encounter Date: 3/5/2025   Encounter department: [facility]    Assessment & Plan  Subacute cough  Does have some intermittent problems with cough and gave Tessalon Perles to use as needed  Orders:    benzonatate (TESSALON) 200 MG capsule; Take 1 capsule (200 mg total) by mouth 3 (three) times a day as needed for cough    Injury of toe on left foot, initial encounter  The infection appears clear but there is still open area on the top of the toe and should cover this using Silvadene and dressing.  If redness develops around should call  Orders:    silver sulfadiazine (SILVADENE,SSD) 1 % cream; Apply topically daily Apply to area on the toe in the left foot that is open    Colostomy present (HCC)  Seems to be doing well, continue basic care       Rectal carcinoma (HCC)  This appears stable       Paroxysmal atrial fibrillation (HCC)  Appears in sinus rhythm at this time.  Continue current regimen but is only taking the aspirin for anticoagulation       Chronic renal failure, stage 3a (HCC)  Lab Results   Component Value Date    EGFR 27 2025    EGFR 29 2024    EGFR 32 2024    CREATININE 2.01 (H) 2025    CREATININE 1.91 (H) 2024    CREATININE 1.76 (H) 2024     Overall stable, will continue to follow I do not think this should produce additional problem with aging       Non-seasonal allergic rhinitis, unspecified trigger  May continue the nasal spray and nasal rinse       Gait abnormality  Push walking activity using either cane or walking stick to maintain leg strength       Gross hematuria  Not an issue at this time.  Is only taking a baby aspirin       Mild protein-calorie malnutrition (HCC)  Push nutrition as tolerated       Mixed stress and urge urinary incontinence  Watch fluid intake at night       H/O non-ST elevation myocardial  infarction (NSTEMI)  Appears stable.  Continue current regimen       Medicare annual wellness visit, subsequent           Falls Plan of Care: balance, strength, and gait training instructions were provided. Recommended assistive device to help with gait and balance. Needs to push walking activity and always use either cane or walking stick      Preventive health issues were discussed with patient, and age appropriate screening tests were ordered as noted in patient's After Visit Summary. Personalized health advice and appropriate referrals for health education or preventive services given if needed, as noted in patient's After Visit Summary.    History of Present Illness       Patient has history of hypertension, rectal carcinoma, functional colostomy, allergic rhinitis, paroxysmal atrial fibrillation and urinary incontinence had been hospitalized for paroxysmal atrial fibrillation with lightheadedness.  Was placed on Metroprolol 50 mg twice a day as well as Plavix and digoxin every other day instead of daily.  Was hypotensive and went back to the hospital.  The metoprolol was decreased to 12.5 mg at bedtime.  Plavix was discontinued and Eliquis 2.5 mg was started.  He was also placed on amiodarone and digoxin was stopped.  Was continued to have intermittent problems with gross hematuria and the Eliquis was stopped but was continue with baby aspirin.  Still has not followed up with urology.  Had developed some swelling and redness in the left foot with apparently with infection on top of the left middle toe.  Was placed on doxycycline and this has resolved but still has the open area of skin.  Seen today also for Medicare wellness visit       Patient Care Team:  Nba Sweet MD as PCP - General (Family Medicine)    Review of Systems   Constitutional:  Negative for activity change, appetite change, chills, fatigue, fever and unexpected weight change.   HENT:  Positive for rhinorrhea. Negative for congestion  and trouble swallowing.    Eyes: Negative.  Negative for visual disturbance.   Respiratory:  Positive for cough (Intermittent cough which does respond to Tessalon Perles and would like refill). Negative for apnea, chest tightness and shortness of breath.    Cardiovascular:  Negative for chest pain, palpitations and leg swelling.   Gastrointestinal:  Negative for abdominal distention, abdominal pain, constipation and diarrhea.   Endocrine: Positive for polyuria.   Genitourinary:  Negative for difficulty urinating and hematuria.   Musculoskeletal:  Negative for arthralgias and myalgias.   Skin:  Negative for color change and rash.   Allergic/Immunologic: Positive for environmental allergies.   Neurological:  Negative for dizziness, weakness, light-headedness, numbness and headaches.   Hematological:  Negative for adenopathy.   Psychiatric/Behavioral:  Negative for agitation and sleep disturbance.    All other systems reviewed and are negative.    Medical History Reviewed by provider this encounter:  Tobacco  Allergies  Meds  Problems  Med Hx  Surg Hx  Fam Hx       Annual Wellness Visit Questionnaire   Av is here for his Subsequent Wellness visit. Last Medicare Wellness visit information reviewed, patient interviewed and updates made to the record today.      Health Risk Assessment:   Patient rates overall health as good. Patient feels that their physical health rating is same. Patient is satisfied with their life. Eyesight was rated as same. Hearing was rated as same. Patient feels that their emotional and mental health rating is same. Patients states they are never, rarely angry. Patient states they are sometimes unusually tired/fatigued. Pain experienced in the last 7 days has been none. Patient states that he has experienced no weight loss or gain in last 6 months. No issues.    Fall Risk Screening:   In the past year, patient has experienced: history of falling in past year    Number of falls:  1  Injured during fall?: Yes    Feels unsteady when standing or walking?: No    Worried about falling?: No      Home Safety:  Patient does not have trouble with stairs inside or outside of their home. Patient has working smoke alarms and has working carbon monoxide detector. Home safety hazards include: none. Should always use cane or walking stick when walking and push walking activity to improve leg strength.  I did advise grab bars in bathroom    Nutrition:   Current diet is Regular. No issues push basic nutrition    Medications:   Patient is currently taking over-the-counter supplements. OTC medications include: see medication list. Patient is able to manage medications. Should follow-up with cardiology and urology but is reluctant    Activities of Daily Living (ADLs)/Instrumental Activities of Daily Living (IADLs):   Walk and transfer into and out of bed and chair?: Yes  Dress and groom yourself?: Yes    Bathe or shower yourself?: Yes    Feed yourself? Yes  Do your laundry/housekeeping?: Yes  Manage your money, pay your bills and track your expenses?: Yes  Make your own meals?: Yes    Do your own shopping?: Yes    ADL comments: Seems to function well around the house with assistance    Previous Hospitalizations:   Any hospitalizations or ED visits within the last 12 months?: Yes    How many hospitalizations have you had in the last year?: 1-2    Hospitalization Comments: Was having trouble with hematuria but this seems to have resolved    Advance Care Planning:   Living will: No    Durable POA for healthcare: No    Advanced directive: No    Advanced directive counseling given: Yes    ACP document given: Yes    End of Life Decisions reviewed with patient: No      Comments: Discussed the importance of doing this and they gave him a form for this    Cognitive Screening:   Provider or family/friend/caregiver concerned regarding cognition?: No    PREVENTIVE SCREENINGS      Cardiovascular Screening:    General:  Screening Current and Screening Not Indicated      Diabetes Screening:     General: Screening Current and Screening Not Indicated      Colorectal Cancer Screening:     General: Screening Not Indicated and History Colorectal Cancer      Prostate Cancer Screening:    General: Screening Not Indicated      Osteoporosis Screening:    General: History Osteoporosis      Abdominal Aortic Aneurysm (AAA) Screening:    Risk factors include: tobacco use        General: Screening Not Indicated      Lung Cancer Screening:     General: Screening Not Indicated      Hepatitis C Screening:    General: Screening Not Indicated      Preventive Screening Comments: Should follow-up with routine eye care and have reports forwarded to office    Screening, Brief Intervention, and Referral to Treatment (SBIRT)     Screening  Typical number of drinks in a day: 0  Typical number of drinks in a week: 0  Interpretation: Low risk drinking behavior.    Brief Intervention  Alcohol & drug use screenings were reviewed. No concerns regarding substance use disorder identified.     Annual Depression Screening  Time spent screening and evaluating the patient for depression during today's encounter was 5 minutes.    Other Counseling Topics:   Does live with daughter and seems to be functioning well    Social Drivers of Health     Financial Resource Strain: Low Risk  (1/4/2024)    Overall Financial Resource Strain (CARDIA)     Difficulty of Paying Living Expenses: Not hard at all   Food Insecurity: No Food Insecurity (3/5/2025)    Hunger Vital Sign     Worried About Running Out of Food in the Last Year: Never true     Ran Out of Food in the Last Year: Never true   Transportation Needs: No Transportation Needs (3/5/2025)    PRAPARE - Transportation     Lack of Transportation (Medical): No     Lack of Transportation (Non-Medical): No   Housing Stability: Unknown (3/5/2025)    Housing Stability Vital Sign     Unable to Pay for Housing in the Last Year: No      "Homeless in the Last Year: No   Utilities: Not At Risk (3/5/2025)    [facility] Utilities     Threatened with loss of utilities: No     No results found.    Objective   Blood Pressure 110/70 (BP Location: Left arm, Patient Position: Sitting, Cuff Size: Standard)   Height 5' 9\" (1.753 m)   Weight 59.4 kg (131 lb)   Body Mass Index 19.35 kg/m²     Physical Exam  Vitals and nursing note reviewed.   Constitutional:       General: He is not in acute distress.     Appearance: He is underweight.   HENT:      Head: Normocephalic and atraumatic.      Right Ear: Tympanic membrane, ear canal and external ear normal. Decreased hearing (25 dB hearing screen is off.  Mild to moderate difficulty with conversation) noted.      Left Ear: Tympanic membrane, ear canal and external ear normal. Decreased hearing (25 dB hearing screen is off) noted.      Nose: Nose normal.      Mouth/Throat:      Mouth: Mucous membranes are moist.      Dentition: Abnormal dentition (No dentition). Has dentures (Upper dentures).   Eyes:      Extraocular Movements: Extraocular movements intact.      Conjunctiva/sclera: Conjunctivae normal.      Pupils: Pupils are equal, round, and reactive to light.   Neck:      Vascular: No carotid bruit.   Cardiovascular:      Rate and Rhythm: Normal rate and regular rhythm.      Pulses:           Dorsalis pedis pulses are 1+ on the right side and 1+ on the left side.        Posterior tibial pulses are 1+ on the right side and 1+ on the left side.      Heart sounds: Heart sounds are distant. Murmur (Soft systolic murmur at left sternal border.  Heart rate is 78) heard.   Pulmonary:      Effort: Pulmonary effort is normal. No respiratory distress.      Breath sounds: Normal breath sounds.   Abdominal:      General: Abdomen is flat.      Palpations: Abdomen is soft. There is no mass.      Tenderness: There is no abdominal tenderness.   Musculoskeletal:         General: No swelling.      Cervical back: Neck supple. No " tenderness.      Right foot: No deformity.      Left foot: No deformity.   Feet:      Right foot:      Protective Sensation: 8 sites tested.  8 sites sensed.      Skin integrity: Skin integrity normal.      Left foot:      Protective Sensation: 8 sites tested.  8 sites sensed.      Skin integrity: Skin breakdown present.   Lymphadenopathy:      Cervical: No cervical adenopathy.   Skin:     General: Skin is warm and dry.      Capillary Refill: Capillary refill takes less than 2 seconds.      Findings: No rash.   Neurological:      Mental Status: He is alert.      Motor: Weakness present.      Coordination: Coordination normal.      Gait: Gait abnormal.      Deep Tendon Reflexes: Reflexes abnormal.   Psychiatric:         Mood and Affect: Mood normal.

## 2025-03-05 NOTE — PATIENT INSTRUCTIONS
Overall seems to be functioning well with assistance at home.  Needs to use a cane or walking stick when walking to help with the balance and help prevent falling.  Should use the Silvadene with a dressing over it on the toe and the left foot until the area heals.  If starts to develop redness in the foot again should call but infection looks otherwise cleared.  Does continue follow-up with cardiology.  Continue current meds.  Note is now taking Xyzal instead of the montelukast

## 2025-03-05 NOTE — ASSESSMENT & PLAN NOTE
Lab Results   Component Value Date    EGFR 27 01/06/2025    EGFR 29 03/16/2024    EGFR 32 03/05/2024    CREATININE 2.01 (H) 01/06/2025    CREATININE 1.91 (H) 03/16/2024    CREATININE 1.76 (H) 03/05/2024     Overall stable, will continue to follow I do not think this should produce additional problem with aging

## 2025-03-12 DIAGNOSIS — I48.0 PAROXYSMAL ATRIAL FIBRILLATION (HCC): ICD-10-CM

## 2025-03-13 RX ORDER — METOPROLOL SUCCINATE 25 MG/1
TABLET, EXTENDED RELEASE ORAL
Qty: 45 TABLET | Refills: 1 | Status: SHIPPED | OUTPATIENT
Start: 2025-03-13

## 2025-03-15 DIAGNOSIS — Z93.3 COLOSTOMY PRESENT (HCC): Chronic | ICD-10-CM

## 2025-03-17 RX ORDER — OSTOMY SUPPLY 1 3/4"
EACH MISCELLANEOUS
Qty: 30 EACH | Refills: 5 | Status: SHIPPED | OUTPATIENT
Start: 2025-03-17

## 2025-04-04 PROBLEM — Z00.00 MEDICARE ANNUAL WELLNESS VISIT, SUBSEQUENT: Status: RESOLVED | Noted: 2025-03-05 | Resolved: 2025-04-04

## 2025-06-04 ENCOUNTER — OFFICE VISIT (OUTPATIENT)
Age: OVER 89
End: 2025-06-04
Payer: MEDICARE

## 2025-06-04 VITALS
OXYGEN SATURATION: 97 % | WEIGHT: 139 LBS | TEMPERATURE: 98.4 F | SYSTOLIC BLOOD PRESSURE: 134 MMHG | BODY MASS INDEX: 20.59 KG/M2 | HEIGHT: 69 IN | DIASTOLIC BLOOD PRESSURE: 68 MMHG | HEART RATE: 71 BPM

## 2025-06-04 DIAGNOSIS — I25.2 H/O NON-ST ELEVATION MYOCARDIAL INFARCTION (NSTEMI): ICD-10-CM

## 2025-06-04 DIAGNOSIS — C20 RECTAL CARCINOMA (HCC): Chronic | ICD-10-CM

## 2025-06-04 DIAGNOSIS — I48.0 PAROXYSMAL ATRIAL FIBRILLATION (HCC): Chronic | ICD-10-CM

## 2025-06-04 DIAGNOSIS — R26.9 GAIT ABNORMALITY: Primary | ICD-10-CM

## 2025-06-04 DIAGNOSIS — Z93.3 COLOSTOMY PRESENT (HCC): Chronic | ICD-10-CM

## 2025-06-04 PROCEDURE — 99214 OFFICE O/P EST MOD 30 MIN: CPT | Performed by: FAMILY MEDICINE

## 2025-06-04 NOTE — PATIENT INSTRUCTIONS
Overall seems to be doing well.  Try to get more walking activity again and use a cane or a walking stick to strengthen the legs.  Will continue on current meds

## 2025-06-04 NOTE — PROGRESS NOTES
Name: Av Rossi Jr.      : 1930      MRN: [id number]  Encounter Provider: Nba Sweet MD  Encounter Date: 2025   Encounter department: [facility]    Assessment & Plan  Gait abnormality  Needs to push walking activity using either cane or walking stick to improve leg strength       Paroxysmal atrial fibrillation (HCC)  Appears in sinus rhythm at this time, continue current regimen       Rectal carcinoma (HCC)  Has been stable.  Will continue to follow       Colostomy present (HCC)  Continue current skin care       H/O non-ST elevation myocardial infarction (NSTEMI)  Seems to be doing well, continue current regimen.  Push activity as tolerated            History of Present Illness       Patient has history of hypertension, rectal carcinoma, functional colostomy, allergic rhinitis, paroxysmal atrial fibrillation and urinary incontinence had been hospitalized for paroxysmal atrial fibrillation with lightheadedness.  Was placed on Metroprolol 50 mg twice a day as well as Plavix and digoxin every other day instead of daily.  Was hypotensive and went back to the hospital.  The metoprolol was decreased to 12.5 mg at bedtime.  Plavix was discontinued and Eliquis 2.5 mg was started.  He was also placed on amiodarone and digoxin was stopped.  Was continued to have intermittent problems with gross hematuria and the Eliquis was stopped but was continue with baby aspirin.  Overall has been doing well      Review of Systems   Constitutional:  Negative for activity change, appetite change, chills, fatigue, fever and unexpected weight change.   HENT:  Negative for rhinorrhea and trouble swallowing.    Eyes: Negative.  Negative for visual disturbance.   Respiratory:  Negative for apnea, cough, chest tightness and shortness of breath.    Cardiovascular:  Negative for chest pain, palpitations and leg swelling.   Gastrointestinal:  Negative for abdominal distention,  "abdominal pain, constipation and diarrhea (Does have functioning colostomy).   Endocrine: Negative for polyuria (Nocturia x 1).   Genitourinary:  Negative for difficulty urinating.   Musculoskeletal:  Negative for arthralgias and myalgias.   Skin:  Negative for color change and rash.   Allergic/Immunologic: Negative for environmental allergies.   Neurological:  Positive for weakness. Negative for dizziness, light-headedness, numbness and headaches.   Hematological:  Negative for adenopathy.   Psychiatric/Behavioral:  Negative for agitation and sleep disturbance.    All other systems reviewed and are negative.    Past Medical History[1]  Past Surgical History[2]  Family History[3]  Social History[4]  Medications[5]  No Known Allergies  Immunization History   Administered Date(s) Administered    INFLUENZA 09/12/2007, 09/18/2008, 09/14/2009, 09/16/2010, 10/25/2011, 11/02/2012, 11/14/2013, 11/20/2014, 10/08/2015, 11/30/2016, 12/12/2017, 10/18/2018    Influenza, high dose seasonal 0.7 mL 10/29/2019, 10/29/2020, 09/20/2021, 09/26/2022, 10/03/2023    Pneumococcal Polysaccharide PPV23 08/20/2014     Objective   /80 (BP Location: Right arm, Patient Position: Sitting, Cuff Size: Standard)   Pulse 71   Temp 98.4 °F (36.9 °C) (Temporal)   Ht 5' 9\" (1.753 m)   Wt 63 kg (139 lb)   SpO2 97%   BMI 20.53 kg/m²     Physical Exam  Vitals and nursing note reviewed.   Constitutional:       General: He is not in acute distress.     Appearance: Normal appearance. He is well-developed.   HENT:      Head: Normocephalic and atraumatic.      Nose: Nose normal.     Eyes:      Conjunctiva/sclera: Conjunctivae normal.       Cardiovascular:      Rate and Rhythm: Normal rate and regular rhythm.      Heart sounds: Murmur (Soft systolic murmur at left sternal border.  Heart rate is 78) heard.   Pulmonary:      Effort: Pulmonary effort is normal. No respiratory distress.      Breath sounds: Normal breath sounds.   Abdominal:      " "Palpations: Abdomen is soft.      Tenderness: There is no abdominal tenderness.     Musculoskeletal:         General: No swelling.      Cervical back: Neck supple.     Skin:     General: Skin is warm and dry.      Findings: No rash.     Neurological:      Mental Status: He is alert.      Motor: No weakness.      Coordination: Coordination normal.      Gait: Gait normal.      Deep Tendon Reflexes: Reflexes abnormal (Reflexes diminished).     Psychiatric:         Mood and Affect: Mood normal.                [1]   Past Medical History:  Diagnosis Date    Atrial fibrillation (HCC)     Colorectal cancer (HCC)     HL (hearing loss)     Hypertension     Pneumonia     Rectal carcinoma (HCC) 7/31/2019   [2]   Past Surgical History:  Procedure Laterality Date    COLECTOMY      COLOSTOMY     [3]   Family History  Problem Relation Name Age of Onset    Heart disease Mother      Heart disease Father     [4]   Social History  Tobacco Use    Smoking status: Former     Current packs/day: 0.50     Average packs/day: 0.5 packs/day for 40.0 years (20.0 ttl pk-yrs)     Types: Cigarettes    Smokeless tobacco: Never   Vaping Use    Vaping status: Never Used   Substance and Sexual Activity    Alcohol use: Yes    Drug use: No   [5]   Current Outpatient Medications on File Prior to Visit   Medication Sig    amiodarone 100 mg tablet Take 100 mg by mouth in the morning.    benzonatate (TESSALON) 200 MG capsule Take 1 capsule (200 mg total) by mouth 3 (three) times a day as needed for cough    Levocetirizine Dihydrochloride (XYZAL PO) Take by mouth    metoprolol succinate (TOPROL-XL) 25 mg 24 hr tablet TAKE ONE HALF TABLET BY MOUTH ONCE NIGHTLY AT BEDTIME    Multiple Vitamin (MULTIVITAMIN) capsule Take 1 capsule by mouth in the morning.    Ostomy Supplies (New Image Closed 2-3/4\") Pouch MISC USE AS DIRECTED BY YOUR PHYSICIAN.    Ostomy Supplies ([employer] Image Flat Barrier 70MM) WAFR USE AS DIRECTED BY YOUR PHYSICIAN.    Ostomy Supplies PSTE USE AS " DIRECTED BY PHYSICIAN    silver sulfadiazine (SILVADENE,SSD) 1 % cream Apply topically daily Apply to area on the toe in the left foot that is open    azelastine (ASTELIN) 0.1 % nasal spray 1 spray into each nostril 2 (two) times a day Use in each nostril as directed (Patient not taking: Reported on 6/4/2025)    multivitamin (THERAGRAN) TABS Take 1 tablet by mouth daily (Patient not taking: Reported on 6/4/2025)

## 2025-06-17 DIAGNOSIS — Z93.3 COLOSTOMY PRESENT (HCC): Chronic | ICD-10-CM

## 2025-06-18 RX ORDER — OSTOMY SUPPLY 1 3/4"
EACH MISCELLANEOUS
Qty: 30 WAFER | Refills: 5 | Status: SHIPPED | OUTPATIENT
Start: 2025-06-18